# Patient Record
Sex: MALE | Race: WHITE | NOT HISPANIC OR LATINO | Employment: FULL TIME | URBAN - METROPOLITAN AREA
[De-identification: names, ages, dates, MRNs, and addresses within clinical notes are randomized per-mention and may not be internally consistent; named-entity substitution may affect disease eponyms.]

---

## 2017-02-06 RX ORDER — ESCITALOPRAM OXALATE 20 MG/1
TABLET ORAL
Qty: 90 TABLET | Refills: 0 | Status: SHIPPED | OUTPATIENT
Start: 2017-02-06 | End: 2017-07-05 | Stop reason: SDUPTHER

## 2017-02-06 RX ORDER — ESOMEPRAZOLE MAGNESIUM 40 MG/1
CAPSULE, DELAYED RELEASE ORAL
Qty: 90 CAPSULE | Refills: 0 | Status: SHIPPED | OUTPATIENT
Start: 2017-02-06 | End: 2017-07-05 | Stop reason: SDUPTHER

## 2017-02-06 RX ORDER — FENOFIBRATE 145 MG/1
TABLET, COATED ORAL
Qty: 90 TABLET | Refills: 0 | Status: SHIPPED | OUTPATIENT
Start: 2017-02-06 | End: 2017-07-05 | Stop reason: SDUPTHER

## 2017-02-06 RX ORDER — ROSUVASTATIN CALCIUM 40 MG/1
TABLET, COATED ORAL
Qty: 90 TABLET | Refills: 0 | Status: SHIPPED | OUTPATIENT
Start: 2017-02-06 | End: 2017-07-05 | Stop reason: SDUPTHER

## 2017-07-06 RX ORDER — FENOFIBRATE 145 MG/1
TABLET, COATED ORAL
Qty: 90 TABLET | Refills: 0 | Status: SHIPPED | OUTPATIENT
Start: 2017-07-06 | End: 2017-10-05 | Stop reason: SDUPTHER

## 2017-07-06 RX ORDER — ESCITALOPRAM OXALATE 20 MG/1
TABLET ORAL
Qty: 90 TABLET | Refills: 0 | Status: SHIPPED | OUTPATIENT
Start: 2017-07-06 | End: 2017-10-05 | Stop reason: SDUPTHER

## 2017-07-06 RX ORDER — ESOMEPRAZOLE MAGNESIUM 40 MG/1
CAPSULE, DELAYED RELEASE ORAL
Qty: 90 CAPSULE | Refills: 0 | Status: SHIPPED | OUTPATIENT
Start: 2017-07-06 | End: 2017-10-05 | Stop reason: SDUPTHER

## 2017-07-06 RX ORDER — ROSUVASTATIN CALCIUM 40 MG/1
TABLET, COATED ORAL
Qty: 90 TABLET | Refills: 0 | Status: SHIPPED | OUTPATIENT
Start: 2017-07-06 | End: 2017-10-05 | Stop reason: SDUPTHER

## 2017-10-11 RX ORDER — FENOFIBRATE 145 MG/1
TABLET, COATED ORAL
Qty: 90 TABLET | Refills: 0 | Status: SHIPPED | OUTPATIENT
Start: 2017-10-11 | End: 2018-01-23 | Stop reason: SDUPTHER

## 2017-10-11 RX ORDER — ESOMEPRAZOLE MAGNESIUM 40 MG/1
CAPSULE, DELAYED RELEASE ORAL
Qty: 90 CAPSULE | Refills: 0 | Status: SHIPPED | OUTPATIENT
Start: 2017-10-11 | End: 2017-11-15

## 2017-10-11 RX ORDER — ESCITALOPRAM OXALATE 20 MG/1
TABLET ORAL
Qty: 90 TABLET | Refills: 0 | Status: SHIPPED | OUTPATIENT
Start: 2017-10-11 | End: 2018-01-23 | Stop reason: SDUPTHER

## 2017-10-11 RX ORDER — ROSUVASTATIN CALCIUM 40 MG/1
TABLET, COATED ORAL
Qty: 90 TABLET | Refills: 0 | Status: SHIPPED | OUTPATIENT
Start: 2017-10-11 | End: 2018-01-23 | Stop reason: SDUPTHER

## 2017-11-15 ENCOUNTER — OFFICE VISIT (OUTPATIENT)
Dept: SPORTS MEDICINE | Facility: CLINIC | Age: 57
End: 2017-11-15

## 2017-11-15 VITALS
HEART RATE: 70 BPM | SYSTOLIC BLOOD PRESSURE: 112 MMHG | DIASTOLIC BLOOD PRESSURE: 70 MMHG | BODY MASS INDEX: 38.04 KG/M2 | OXYGEN SATURATION: 96 % | WEIGHT: 251 LBS | HEIGHT: 68 IN

## 2017-11-15 DIAGNOSIS — Z00.00 ANNUAL PHYSICAL EXAM: Primary | ICD-10-CM

## 2017-11-15 DIAGNOSIS — H66.002 ACUTE SUPPURATIVE OTITIS MEDIA OF LEFT EAR WITHOUT SPONTANEOUS RUPTURE OF TYMPANIC MEMBRANE, RECURRENCE NOT SPECIFIED: Primary | ICD-10-CM

## 2017-11-15 PROBLEM — T81.30XA WOUND DEHISCENCE: Status: ACTIVE | Noted: 2017-03-08

## 2017-11-15 PROCEDURE — 99213 OFFICE O/P EST LOW 20 MIN: CPT | Performed by: FAMILY MEDICINE

## 2017-11-15 PROCEDURE — 96372 THER/PROPH/DIAG INJ SC/IM: CPT | Performed by: FAMILY MEDICINE

## 2017-11-15 RX ORDER — AMOXICILLIN AND CLAVULANATE POTASSIUM 875; 125 MG/1; MG/1
1 TABLET, FILM COATED ORAL 2 TIMES DAILY
Qty: 20 TABLET | Refills: 0 | Status: SHIPPED | OUTPATIENT
Start: 2017-11-15 | End: 2017-11-20

## 2017-11-15 RX ORDER — METHYLPREDNISOLONE ACETATE 80 MG/ML
80 INJECTION, SUSPENSION INTRA-ARTICULAR; INTRALESIONAL; INTRAMUSCULAR; SOFT TISSUE ONCE
Status: COMPLETED | OUTPATIENT
Start: 2017-11-15 | End: 2017-11-15

## 2017-11-15 RX ORDER — MELOXICAM 15 MG/1
TABLET ORAL
Refills: 3 | COMMUNITY
Start: 2017-11-02 | End: 2018-10-15

## 2017-11-15 RX ADMIN — METHYLPREDNISOLONE ACETATE 80 MG: 80 INJECTION, SUSPENSION INTRA-ARTICULAR; INTRALESIONAL; INTRAMUSCULAR; SOFT TISSUE at 11:42

## 2017-11-15 NOTE — PROGRESS NOTES
"Familia is a 57 y.o. year old male    Chief Complaint   Patient presents with   • Earache       History of Present Illness   HPI   Patient began to note left ear pain yesterday, it seems to have increased over the past 24 hours.  Very tender in and around the ear.  Also has noted some swelling over the anterior ear.  Patient wears a hearing aid in the left ear.  No fever or chills.  No upper respiratory symptoms.  No change in baseline hearing.    I have reviewed the patient's medical, family, and social history in detail and updated the computerized patient record.    Review of Systems   Constitutional: Negative.    HENT: Positive for ear pain. Negative for congestion, dental problem, drooling, ear discharge, facial swelling, postnasal drip, rhinorrhea, sinus pain, sinus pressure and sore throat.    Eyes: Negative.    Respiratory: Negative.    Cardiovascular: Negative.        /70  Pulse 70  Ht 68\" (172.7 cm)  Wt 251 lb (114 kg)  SpO2 96%  BMI 38.16 kg/m2     Physical Exam   Constitutional: He is oriented to person, place, and time. He appears well-developed and well-nourished.   HENT:   Head: Normocephalic and atraumatic.   Right Ear: External ear normal.   Nose: Nose normal.   Mouth/Throat: Oropharynx is clear and moist.   Left TM with purulent material behind the TM and erythema of the umbo.  Patient has tenderness around the mastoid in the tragus but I do not appreciate swelling here area there is no lymphadenopathy in the neck.  There is no evidence of inflammation around the parotid duct.   Eyes: Conjunctivae are normal. No scleral icterus.   Neck: Neck supple. No thyromegaly present.   Cardiovascular: Normal rate, regular rhythm and normal heart sounds.    Pulmonary/Chest: Effort normal and breath sounds normal.   Lymphadenopathy:     He has no cervical adenopathy.   Neurological: He is alert and oriented to person, place, and time.   Skin: Skin is warm and dry.   Psychiatric: He has a normal mood " and affect. His behavior is normal.   Vitals reviewed.       Diagnoses and all orders for this visit:    Acute suppurative otitis media of left ear without spontaneous rupture of tympanic membrane, recurrence not specified  -     amoxicillin-clavulanate (AUGMENTIN) 875-125 MG per tablet; Take 1 tablet by mouth 2 (Two) Times a Day. With food  -     Antipyrine-Benzocaine 55-14 MG/ML solution; Administer 4 drops into ears Every 6 (Six) Hours As Needed (ear pain).  -     methylPREDNISolone acetate (DEPO-medrol) injection 80 mg; Inject 1 mL into the shoulder, thigh, or buttocks 1 (One) Time.    Other orders  -     meloxicam (MOBIC) 15 MG tablet; TK 1 T PO QD      Recheck one week    EMR Dragon/Transcription disclaimer:    Much of this encounter note is an electronic transcription/translation of spoken language to printed text.  The electronic translation of spoken language may permit erroneous, or at times, nonsensical words or phrases to be inadvertently transcribed.  Although I have reviewed the note for such errors some may still exist.

## 2017-11-16 LAB
25(OH)D3+25(OH)D2 SERPL-MCNC: 25 NG/ML (ref 30–100)
ALBUMIN SERPL-MCNC: 4.6 G/DL (ref 3.5–5.2)
ALBUMIN/GLOB SERPL: 1.5 G/DL
ALP SERPL-CCNC: 63 U/L (ref 39–117)
ALT SERPL-CCNC: 43 U/L (ref 1–41)
APPEARANCE UR: CLEAR
AST SERPL-CCNC: 36 U/L (ref 1–40)
BACTERIA #/AREA URNS HPF: NORMAL /HPF
BASOPHILS # BLD AUTO: 0.02 10*3/MM3 (ref 0–0.2)
BASOPHILS NFR BLD AUTO: 0.2 % (ref 0–1.5)
BILIRUB SERPL-MCNC: 0.5 MG/DL (ref 0.1–1.2)
BILIRUB UR QL STRIP: NEGATIVE
BUN SERPL-MCNC: 13 MG/DL (ref 6–20)
BUN/CREAT SERPL: 9.6 (ref 7–25)
CALCIUM SERPL-MCNC: 9.7 MG/DL (ref 8.6–10.5)
CHLORIDE SERPL-SCNC: 100 MMOL/L (ref 98–107)
CHOLEST SERPL-MCNC: 116 MG/DL (ref 0–200)
CO2 SERPL-SCNC: 26.3 MMOL/L (ref 22–29)
COLOR UR: YELLOW
CREAT SERPL-MCNC: 1.35 MG/DL (ref 0.76–1.27)
EOSINOPHIL # BLD AUTO: 0.13 10*3/MM3 (ref 0–0.7)
EOSINOPHIL NFR BLD AUTO: 1.5 % (ref 0.3–6.2)
EPI CELLS #/AREA URNS HPF: NORMAL /HPF
ERYTHROCYTE [DISTWIDTH] IN BLOOD BY AUTOMATED COUNT: 13.7 % (ref 11.5–14.5)
GFR SERPLBLD CREATININE-BSD FMLA CKD-EPI: 54 ML/MIN/1.73
GFR SERPLBLD CREATININE-BSD FMLA CKD-EPI: 66 ML/MIN/1.73
GLOBULIN SER CALC-MCNC: 3 GM/DL
GLUCOSE SERPL-MCNC: 103 MG/DL (ref 65–99)
GLUCOSE UR QL: NEGATIVE
HCT VFR BLD AUTO: 45.1 % (ref 40.4–52.2)
HDLC SERPL-MCNC: 21 MG/DL (ref 40–60)
HGB BLD-MCNC: 14.5 G/DL (ref 13.7–17.6)
HGB UR QL STRIP: NEGATIVE
IMM GRANULOCYTES # BLD: 0 10*3/MM3 (ref 0–0.03)
IMM GRANULOCYTES NFR BLD: 0 % (ref 0–0.5)
KETONES UR QL STRIP: NEGATIVE
LDLC SERPL CALC-MCNC: 50 MG/DL (ref 0–100)
LEUKOCYTE ESTERASE UR QL STRIP: NEGATIVE
LYMPHOCYTES # BLD AUTO: 1.91 10*3/MM3 (ref 0.9–4.8)
LYMPHOCYTES NFR BLD AUTO: 21.9 % (ref 19.6–45.3)
MCH RBC QN AUTO: 30.9 PG (ref 27–32.7)
MCHC RBC AUTO-ENTMCNC: 32.2 G/DL (ref 32.6–36.4)
MCV RBC AUTO: 96.2 FL (ref 79.8–96.2)
MICRO URNS: NORMAL
MICRO URNS: NORMAL
MONOCYTES # BLD AUTO: 0.62 10*3/MM3 (ref 0.2–1.2)
MONOCYTES NFR BLD AUTO: 7.1 % (ref 5–12)
MUCOUS THREADS URNS QL MICRO: PRESENT /HPF
NEUTROPHILS # BLD AUTO: 6.05 10*3/MM3 (ref 1.9–8.1)
NEUTROPHILS NFR BLD AUTO: 69.3 % (ref 42.7–76)
NITRITE UR QL STRIP: NEGATIVE
PH UR STRIP: 7 [PH] (ref 5–7.5)
PLATELET # BLD AUTO: 171 10*3/MM3 (ref 140–500)
POTASSIUM SERPL-SCNC: 4.5 MMOL/L (ref 3.5–5.2)
PROT SERPL-MCNC: 7.6 G/DL (ref 6–8.5)
PROT UR QL STRIP: NEGATIVE
PSA SERPL-MCNC: 6.34 NG/ML (ref 0–4)
RBC # BLD AUTO: 4.69 10*6/MM3 (ref 4.6–6)
RBC #/AREA URNS HPF: NORMAL /HPF
SODIUM SERPL-SCNC: 140 MMOL/L (ref 136–145)
SP GR UR: 1.01 (ref 1–1.03)
TRIGL SERPL-MCNC: 227 MG/DL (ref 0–150)
TSH SERPL DL<=0.005 MIU/L-ACNC: 3.75 UIU/ML (ref 0.45–4.5)
URINALYSIS REFLEX: NORMAL
UROBILINOGEN UR STRIP-MCNC: 0.2 MG/DL (ref 0.2–1)
VLDLC SERPL CALC-MCNC: 45.4 MG/DL (ref 5–40)
WBC # BLD AUTO: 8.73 10*3/MM3 (ref 4.5–10.7)
WBC #/AREA URNS HPF: NORMAL /HPF

## 2017-11-17 LAB
HCV AB S/CO SERPL IA: 0.1 S/CO RATIO (ref 0–0.9)
Lab: NORMAL
WRITTEN AUTHORIZATION: NORMAL

## 2017-11-20 ENCOUNTER — OFFICE VISIT (OUTPATIENT)
Dept: SPORTS MEDICINE | Facility: CLINIC | Age: 57
End: 2017-11-20

## 2017-11-20 VITALS
HEIGHT: 68 IN | OXYGEN SATURATION: 97 % | WEIGHT: 247 LBS | DIASTOLIC BLOOD PRESSURE: 74 MMHG | SYSTOLIC BLOOD PRESSURE: 114 MMHG | HEART RATE: 83 BPM | BODY MASS INDEX: 37.44 KG/M2

## 2017-11-20 DIAGNOSIS — H66.002 ACUTE SUPPURATIVE OTITIS MEDIA OF LEFT EAR WITHOUT SPONTANEOUS RUPTURE OF TYMPANIC MEMBRANE, RECURRENCE NOT SPECIFIED: Primary | ICD-10-CM

## 2017-11-20 PROCEDURE — 99212 OFFICE O/P EST SF 10 MIN: CPT | Performed by: FAMILY MEDICINE

## 2017-11-20 NOTE — PROGRESS NOTES
"Familia is a 57 y.o. year old male    Chief Complaint   Patient presents with   • Follow-up       History of Present Illness   HPI   Patient is here to follow-up left otitis media, seen originally on 11/15/2017.  Patient states he is seen significant improvement in his symptoms however yesterday he did note some bright red blood in the ear canal.  Patient has underlying hearing loss in his left ear but he states \"it seems like he is getting better.  Only has minimal pain over the mastoid area at this time.  I have reviewed the patient's medical, family, and social history in detail and updated the computerized patient record.    Review of Systems   Constitutional: Negative.    HENT: Positive for ear discharge ( right rib blood yesterday however no purulent materal.) and ear pain.    Eyes: Negative.    Respiratory: Negative.        /74  Pulse 83  Ht 68\" (172.7 cm)  Wt 247 lb (112 kg)  SpO2 97%  BMI 37.56 kg/m2     Physical Exam   Constitutional: He is oriented to person, place, and time. He appears well-developed and well-nourished.   HENT:   Head: Normocephalic and atraumatic.   Left TM much improved, I do not see any purulent material and there is no injection over the umbo, no evidence of where the bright red blood may have come from yesterday.    Patient is much less tender around and behind the year on today's exam.   Eyes: Conjunctivae and EOM are normal. Pupils are equal, round, and reactive to light.   Cardiovascular:   No peripheral edema   Pulmonary/Chest: Effort normal.   Neurological: He is alert and oriented to person, place, and time.   Skin: Skin is warm and dry.   Psychiatric: He has a normal mood and affect. His behavior is normal.   Vitals reviewed.       Diagnoses and all orders for this visit:    Acute suppurative otitis media of left ear without spontaneous rupture of tympanic membrane, recurrence not specified      Patient will finish his course of antibiotic and follow-up with us in " 7-10 days if his ear is not back to what he considers normal.    EMR Dragon/Transcription disclaimer:    Much of this encounter note is an electronic transcription/translation of spoken language to printed text.  The electronic translation of spoken language may permit erroneous, or at times, nonsensical words or phrases to be inadvertently transcribed.  Although I have reviewed the note for such errors some may still exist.

## 2018-01-23 RX ORDER — FENOFIBRATE 145 MG/1
TABLET, COATED ORAL
Qty: 90 TABLET | Refills: 0 | Status: SHIPPED | OUTPATIENT
Start: 2018-01-23 | End: 2018-09-26 | Stop reason: SDUPTHER

## 2018-01-23 RX ORDER — ROSUVASTATIN CALCIUM 40 MG/1
TABLET, COATED ORAL
Qty: 90 TABLET | Refills: 0 | Status: SHIPPED | OUTPATIENT
Start: 2018-01-23 | End: 2018-06-15 | Stop reason: SDUPTHER

## 2018-01-23 RX ORDER — ESOMEPRAZOLE MAGNESIUM 40 MG/1
CAPSULE, DELAYED RELEASE ORAL
Qty: 90 CAPSULE | Refills: 0 | Status: SHIPPED | OUTPATIENT
Start: 2018-01-23 | End: 2018-09-26 | Stop reason: SDUPTHER

## 2018-01-23 RX ORDER — ESCITALOPRAM OXALATE 20 MG/1
TABLET ORAL
Qty: 90 TABLET | Refills: 0 | Status: SHIPPED | OUTPATIENT
Start: 2018-01-23 | End: 2018-06-15 | Stop reason: SDUPTHER

## 2018-03-30 ENCOUNTER — OFFICE VISIT (OUTPATIENT)
Dept: SPORTS MEDICINE | Facility: CLINIC | Age: 58
End: 2018-03-30

## 2018-03-30 VITALS — HEIGHT: 68 IN | SYSTOLIC BLOOD PRESSURE: 110 MMHG | DIASTOLIC BLOOD PRESSURE: 70 MMHG

## 2018-03-30 DIAGNOSIS — M51.36 DDD (DEGENERATIVE DISC DISEASE), LUMBAR: ICD-10-CM

## 2018-03-30 DIAGNOSIS — M54.41 ACUTE RIGHT-SIDED LOW BACK PAIN WITH RIGHT-SIDED SCIATICA: Primary | ICD-10-CM

## 2018-03-30 DIAGNOSIS — R10.31 RIGHT INGUINAL PAIN: ICD-10-CM

## 2018-03-30 PROCEDURE — 72100 X-RAY EXAM L-S SPINE 2/3 VWS: CPT | Performed by: FAMILY MEDICINE

## 2018-03-30 PROCEDURE — 72170 X-RAY EXAM OF PELVIS: CPT | Performed by: FAMILY MEDICINE

## 2018-03-30 PROCEDURE — 99214 OFFICE O/P EST MOD 30 MIN: CPT | Performed by: FAMILY MEDICINE

## 2018-03-30 RX ORDER — METHYLPREDNISOLONE 4 MG/1
TABLET ORAL
Qty: 21 TABLET | Refills: 0 | Status: SHIPPED | OUTPATIENT
Start: 2018-03-30 | End: 2018-09-19

## 2018-03-30 NOTE — PROGRESS NOTES
"Familia is a 57 y.o. year old male    Chief Complaint   Patient presents with   • Groin Pain       History of Present Illness  HPI     Groin pain: Acute onset yesterday.  Gradual progression.  Denies fall.  Has had pain in the saddle region which she describes as a dull ache.  History of degenerative disc disease lumbar spine and has been on medication in the past.  States that he had [place].  No bowel or bladder incontinence.  No foot drop.  Pain to the point that he has been ambulating with a 4 stance walker.  History of enlarged prostate.  Has not taking any medication for this.    I have reviewed the patient's medical, family, and social history in detail and updated the computerized patient record.    Review of Systems   Constitutional: Negative for fever.   Musculoskeletal:        Per HPI   Skin: Negative for rash.   Neurological: Negative for weakness and numbness.   Psychiatric/Behavioral: Negative for sleep disturbance.   All other systems reviewed and are negative.      /70   Ht 172.7 cm (68\")      Physical Exam    Vital signs reviewed.   General: No acute distress.  Eyes: conjunctiva clear; pupils equally round and reactive  ENT: external ears and nose atraumatic; oropharynx clear  CV: no peripheral edema, 2+ distal pulses  Resp: normal respiratory effort, no use of accessory muscles  : Normal rectal tone, prostate exam demonstrates smooth and enlarged prostate  Skin: no rashes or wounds; normal turgor  Psych: mood and affect appropriate; recent and remote memory intact  Neuro: sensation to light touch intact; 2+ DTR L4, S1 bilateral    MSK Exam:  Ortho Exam    L-spine: No warmth; tenderness along the right paraspinals; negative straight leg raise bilateral; negative ALY bilateral; negative Stenchfield   L hip: No tenderness or warmth; full range of motion; negative logroll  R hip: No tenderness or warmth; full range of motion; negative " logroll    Lumbar Spine X-Ray  Indication: Pain  Views: AP and Lateral    Findings:  No fracture  No bony lesion  Normal soft tissues  Multilevel DDD, most notable L4-5  levoscoliosis    No prior studies were available for comparison.    Pelvis X-Ray  Indication: Pain  AP and Frogleg views    Findings:  No fracture  No bony lesion  Normal soft tissues  Early OA b/l femoroacetabular joint    No prior studies were available for comparison.      Diagnoses and all orders for this visit:    Acute right-sided low back pain with right-sided sciatica  -     XR Spine Lumbar 2 or 3 View  -     MethylPREDNISolone (MEDROL, ELADIO,) 4 MG tablet; Take as directed on package instructions.    DDD (degenerative disc disease), lumbar  -     MethylPREDNISolone (MEDROL, ELADIO,) 4 MG tablet; Take as directed on package instructions.    Right inguinal pain  -     XR Pelvis 1 or 2 View  -     MethylPREDNISolone (MEDROL, ELADIO,) 4 MG tablet; Take as directed on package instructions.      No red flag symptoms on exam.  Discussed that this is likely sequela of previous degenerative disc disease.  Discussed x-ray findings with patient.  I recommend Medrol Dosepak.  Up ad terra.    EMR Dragon/Transcription disclaimer:    Much of this encounter note is an electronic transcription/translation of spoken language to printed text.  The electronic translation of spoken language may permit erroneous, or at times, nonsensical words or phrases to be inadvertently transcribed.  Although I have reviewed the note for such errors some may still exist.

## 2018-06-15 RX ORDER — ROSUVASTATIN CALCIUM 40 MG/1
TABLET, COATED ORAL
Qty: 90 TABLET | Refills: 0 | Status: SHIPPED | OUTPATIENT
Start: 2018-06-15 | End: 2018-09-26 | Stop reason: SDUPTHER

## 2018-06-15 RX ORDER — ESCITALOPRAM OXALATE 20 MG/1
TABLET ORAL
Qty: 90 TABLET | Refills: 0 | Status: SHIPPED | OUTPATIENT
Start: 2018-06-15 | End: 2018-09-26 | Stop reason: SDUPTHER

## 2018-09-19 ENCOUNTER — OFFICE VISIT (OUTPATIENT)
Dept: SPORTS MEDICINE | Facility: CLINIC | Age: 58
End: 2018-09-19

## 2018-09-19 VITALS
TEMPERATURE: 98.9 F | HEART RATE: 72 BPM | WEIGHT: 241 LBS | HEIGHT: 68 IN | OXYGEN SATURATION: 97 % | SYSTOLIC BLOOD PRESSURE: 108 MMHG | DIASTOLIC BLOOD PRESSURE: 68 MMHG | BODY MASS INDEX: 36.53 KG/M2

## 2018-09-19 DIAGNOSIS — R10.9 RIGHT FLANK PAIN: Primary | ICD-10-CM

## 2018-09-19 DIAGNOSIS — R10.11 RUQ PAIN: ICD-10-CM

## 2018-09-19 PROCEDURE — 99214 OFFICE O/P EST MOD 30 MIN: CPT | Performed by: FAMILY MEDICINE

## 2018-09-19 NOTE — PROGRESS NOTES
"Familia is a 57 y.o. year old male    Chief Complaint   Patient presents with   • Back Pain       History of Present Illness   HPI   Approximately one month ago patient began to note some right-sided flank discomfort that radiated to the right upper quadrant.  No known trauma.  Has not noted any alleviating or precipitating factors.  At that time it seemed to resolve but has returned for the past week.  Pain described as a continuous and dull ache.  No fever or chills.  Normal bowel movements.  No hematuria.  No rash.        I have reviewed the patient's medical, family, and social history in detail and updated the computerized patient record.    Review of Systems   Constitutional: Negative.    HENT: Negative.    Respiratory: Negative.    Cardiovascular: Negative.    Gastrointestinal: Positive for abdominal pain. Negative for blood in stool, nausea and vomiting.   Genitourinary: Positive for flank pain.        Otherwise negative   Neurological:        No radicular symptoms       /68 (BP Location: Right arm, Patient Position: Sitting, Cuff Size: Large Adult)   Pulse 72   Temp 98.9 °F (37.2 °C) (Oral)   Ht 172.7 cm (67.99\")   Wt 109 kg (241 lb)   SpO2 97%   BMI 36.65 kg/m²      Physical Exam   Constitutional: He is oriented to person, place, and time. He appears well-developed and well-nourished.   HENT:   Head: Normocephalic and atraumatic.   Eyes: Pupils are equal, round, and reactive to light. Conjunctivae and EOM are normal.   Cardiovascular:   No peripheral edema   Pulmonary/Chest: Effort normal and breath sounds normal.   Abdominal: Soft. Bowel sounds are normal.   She has some mild tenderness to palpation just anterior to the midaxillary line on the right.    Patient also has right flank discomfort with palpation, somewhat worse with lateral bending to the left.   Neurological: He is alert and oriented to person, place, and time.   Skin: Skin is warm and dry.   Psychiatric: He has a normal mood and " affect. His behavior is normal.   Vitals reviewed.       Diagnoses and all orders for this visit:    Right flank pain  -     CT Abdomen Pelvis Without Contrast; Future  -     Comprehensive Metabolic Panel  -     CBC & Differential  -     UA / M With / Rflx Culture([facility] ONLY) - Urine, Clean Catch  -     Amylase  -     Lipase    RUQ pain  -     CT Abdomen Pelvis Without Contrast; Future  -     Comprehensive Metabolic Panel  -     CBC & Differential  -     UA / M With / Rflx Culture([facility] ONLY) - Urine, Clean Catch  -     Amylase  -     Lipase       This appears to be more consistent with intra-abdominal process rather than musculoskeletal.  We'll check the above labs and CT scan.      EMR Dragon/Transcription disclaimer:    Much of this encounter note is an electronic transcription/translation of spoken language to printed text.  The electronic translation of spoken language may permit erroneous, or at times, nonsensical words or phrases to be inadvertently transcribed.  Although I have reviewed the note for such errors some may still exist.

## 2018-09-20 LAB
ALBUMIN SERPL-MCNC: 4.8 G/DL (ref 3.5–5.2)
ALBUMIN/GLOB SERPL: 1.8 G/DL
ALP SERPL-CCNC: 74 U/L (ref 39–117)
ALT SERPL-CCNC: 63 U/L (ref 1–41)
AMYLASE SERPL-CCNC: 24 U/L (ref 28–100)
APPEARANCE UR: CLEAR
AST SERPL-CCNC: 58 U/L (ref 1–40)
BACTERIA #/AREA URNS HPF: NORMAL /HPF
BASOPHILS # BLD AUTO: 0.02 10*3/MM3 (ref 0–0.2)
BASOPHILS NFR BLD AUTO: 0.3 % (ref 0–1.5)
BILIRUB SERPL-MCNC: 0.4 MG/DL (ref 0.1–1.2)
BILIRUB UR QL STRIP: NEGATIVE
BUN SERPL-MCNC: 12 MG/DL (ref 6–20)
BUN/CREAT SERPL: 10.3 (ref 7–25)
CALCIUM SERPL-MCNC: 9.6 MG/DL (ref 8.6–10.5)
CHLORIDE SERPL-SCNC: 102 MMOL/L (ref 98–107)
CO2 SERPL-SCNC: 27.6 MMOL/L (ref 22–29)
COLOR UR: YELLOW
CREAT SERPL-MCNC: 1.17 MG/DL (ref 0.76–1.27)
EOSINOPHIL # BLD AUTO: 0.09 10*3/MM3 (ref 0–0.7)
EOSINOPHIL NFR BLD AUTO: 1.5 % (ref 0.3–6.2)
EPI CELLS #/AREA URNS HPF: NORMAL /HPF
ERYTHROCYTE [DISTWIDTH] IN BLOOD BY AUTOMATED COUNT: 14.3 % (ref 11.5–14.5)
GLOBULIN SER CALC-MCNC: 2.7 GM/DL
GLUCOSE SERPL-MCNC: 107 MG/DL (ref 65–99)
GLUCOSE UR QL: NEGATIVE
HCT VFR BLD AUTO: 46.2 % (ref 40.4–52.2)
HGB BLD-MCNC: 14.5 G/DL (ref 13.7–17.6)
HGB UR QL STRIP: NEGATIVE
IMM GRANULOCYTES # BLD: 0 10*3/MM3 (ref 0–0.03)
IMM GRANULOCYTES NFR BLD: 0 % (ref 0–0.5)
KETONES UR QL STRIP: NEGATIVE
LEUKOCYTE ESTERASE UR QL STRIP: NEGATIVE
LIPASE SERPL-CCNC: 41 U/L (ref 13–60)
LYMPHOCYTES # BLD AUTO: 1.78 10*3/MM3 (ref 0.9–4.8)
LYMPHOCYTES NFR BLD AUTO: 29.4 % (ref 19.6–45.3)
MCH RBC QN AUTO: 28.9 PG (ref 27–32.7)
MCHC RBC AUTO-ENTMCNC: 31.4 G/DL (ref 32.6–36.4)
MCV RBC AUTO: 92 FL (ref 79.8–96.2)
MICRO URNS: NORMAL
MICRO URNS: NORMAL
MONOCYTES # BLD AUTO: 0.41 10*3/MM3 (ref 0.2–1.2)
MONOCYTES NFR BLD AUTO: 6.8 % (ref 5–12)
MUCOUS THREADS URNS QL MICRO: PRESENT /HPF
NEUTROPHILS # BLD AUTO: 3.76 10*3/MM3 (ref 1.9–8.1)
NEUTROPHILS NFR BLD AUTO: 62 % (ref 42.7–76)
NITRITE UR QL STRIP: NEGATIVE
PH UR STRIP: 6.5 [PH] (ref 5–7.5)
PLATELET # BLD AUTO: 172 10*3/MM3 (ref 140–500)
POTASSIUM SERPL-SCNC: 4.4 MMOL/L (ref 3.5–5.2)
PROT SERPL-MCNC: 7.5 G/DL (ref 6–8.5)
PROT UR QL STRIP: NEGATIVE
RBC # BLD AUTO: 5.02 10*6/MM3 (ref 4.6–6)
RBC #/AREA URNS HPF: NORMAL /HPF
SODIUM SERPL-SCNC: 141 MMOL/L (ref 136–145)
SP GR UR: 1.02 (ref 1–1.03)
URINALYSIS REFLEX: NORMAL
UROBILINOGEN UR STRIP-MCNC: 0.2 MG/DL (ref 0.2–1)
WBC # BLD AUTO: 6.06 10*3/MM3 (ref 4.5–10.7)
WBC #/AREA URNS HPF: NORMAL /HPF

## 2018-09-21 ENCOUNTER — TELEPHONE (OUTPATIENT)
Dept: SPORTS MEDICINE | Facility: CLINIC | Age: 58
End: 2018-09-21

## 2018-09-21 LAB
HAV IGM SERPL QL IA: NEGATIVE
HBV CORE IGM SERPL QL IA: NEGATIVE
HBV SURFACE AG SERPL QL IA: NEGATIVE
HCV AB S/CO SERPL IA: <0.1 S/CO RATIO (ref 0–0.9)
Lab: NORMAL
WRITTEN AUTHORIZATION: NORMAL

## 2018-09-26 ENCOUNTER — OFFICE VISIT (OUTPATIENT)
Dept: SPORTS MEDICINE | Facility: CLINIC | Age: 58
End: 2018-09-26

## 2018-09-26 VITALS
WEIGHT: 240 LBS | BODY MASS INDEX: 36.37 KG/M2 | DIASTOLIC BLOOD PRESSURE: 82 MMHG | SYSTOLIC BLOOD PRESSURE: 120 MMHG | HEIGHT: 68 IN

## 2018-09-26 DIAGNOSIS — R10.31 RLQ ABDOMINAL PAIN: Primary | ICD-10-CM

## 2018-09-26 PROCEDURE — 99213 OFFICE O/P EST LOW 20 MIN: CPT | Performed by: FAMILY MEDICINE

## 2018-09-26 RX ORDER — ESCITALOPRAM OXALATE 20 MG/1
20 TABLET ORAL DAILY
Qty: 90 TABLET | Refills: 0 | Status: SHIPPED | OUTPATIENT
Start: 2018-09-26 | End: 2019-10-18 | Stop reason: SDUPTHER

## 2018-09-26 RX ORDER — ROSUVASTATIN CALCIUM 40 MG/1
40 TABLET, COATED ORAL DAILY
Qty: 90 TABLET | Refills: 0 | Status: SHIPPED | OUTPATIENT
Start: 2018-09-26 | End: 2019-08-29

## 2018-09-26 RX ORDER — ESOMEPRAZOLE MAGNESIUM 40 MG/1
40 CAPSULE, DELAYED RELEASE ORAL DAILY
Qty: 90 CAPSULE | Refills: 0 | Status: SHIPPED | OUTPATIENT
Start: 2018-09-26 | End: 2018-10-15

## 2018-09-26 RX ORDER — FENOFIBRATE 145 MG/1
145 TABLET, COATED ORAL DAILY
Qty: 90 TABLET | Refills: 0 | Status: SHIPPED | OUTPATIENT
Start: 2018-09-26 | End: 2018-10-15

## 2018-09-26 NOTE — PROGRESS NOTES
"Familia is a 57 y.o. year old male    Chief Complaint   Patient presents with   • Flank Pain     right       History of Present Illness   HPI   F/u lower abdominal pain, just right of center. Maybe alleviated by urination. Constant, dull ache, mild-moderate severity. Worsening over the past few months.     I have reviewed the patient's medical, family, and social history in detail and updated the computerized patient record.    Review of Systems   Constitutional: Negative.    Gastrointestinal: Negative for blood in stool.       /82   Ht 172.7 cm (68\")   Wt 109 kg (240 lb)   BMI 36.49 kg/m²      Physical Exam   Constitutional: He appears well-developed and well-nourished.   HENT:   Mouth/Throat: Oropharynx is clear and moist.   Eyes: Pupils are equal, round, and reactive to light.   Pulmonary/Chest: Effort normal.   Abdominal: Soft. Bowel sounds are normal.   There is sharp tenderness to palpation on the right lower abdomen, following the division between the rectus abdominis and the obliques.  This extends superiorly into the mid abdomen as well.  This tenderness is rather sharp, causes him to jump on the table somewhat.   Genitourinary:   Genitourinary Comments: Mildly enlarged prostate, nontender   Psychiatric: He has a normal mood and affect.   Vitals reviewed.       Diagnoses and all orders for this visit:    RLQ abdominal pain  -     Ambulatory Referral to General Surgery    Other orders  -     rosuvastatin (CRESTOR) 40 MG tablet; Take 1 tablet by mouth Daily.  -     fenofibrate (TRICOR) 145 MG tablet; Take 1 tablet by mouth Daily.  -     esomeprazole (nexIUM) 40 MG capsule; Take 1 capsule by mouth Daily.  -     escitalopram (LEXAPRO) 20 MG tablet; Take 1 tablet by mouth Daily.       Reviewed his normal labs and nonacute findings on CT scan from last week.  Based on his exam today, I'm concerned he could have the spigelian hernia, he states that he has had pain intermittently in this region for many " years but it has been getting worse the past month.  Brief bedside ultrasound does not show any abdominal wall defect.    Also due for routine physical, he'll schedule that later this year.      EMR Dragon/Transcription disclaimer:    Much of this encounter note is an electronic transcription/translation of spoken language to printed text.  The electronic translation of spoken language may permit erroneous, or at times, nonsensical words or phrases to be inadvertently transcribed.  Although I have reviewed the note for such errors some may still exist.

## 2018-10-15 ENCOUNTER — OFFICE VISIT (OUTPATIENT)
Dept: SURGERY | Facility: CLINIC | Age: 58
End: 2018-10-15

## 2018-10-15 VITALS — WEIGHT: 240 LBS | OXYGEN SATURATION: 98 % | BODY MASS INDEX: 36.37 KG/M2 | HEIGHT: 68 IN | HEART RATE: 71 BPM

## 2018-10-15 DIAGNOSIS — R10.31 RLQ ABDOMINAL PAIN: Primary | ICD-10-CM

## 2018-10-15 DIAGNOSIS — R10.31 RIGHT INGUINAL PAIN: ICD-10-CM

## 2018-10-15 PROCEDURE — 99244 OFF/OP CNSLTJ NEW/EST MOD 40: CPT | Performed by: SURGERY

## 2018-10-15 RX ORDER — OMEPRAZOLE 20 MG/1
20 CAPSULE, DELAYED RELEASE ORAL DAILY
COMMUNITY

## 2018-10-15 NOTE — PROGRESS NOTES
SUMMARY (A/P):    57-year-old gentleman with long-standing right inguinal and right lower quadrant pain and questionable findings on exam.  CT scan was negative for hernia or adenopathy.  At this point have recommended proceeding with colonoscopy to evaluate further.  If his colonoscopy is negative and it is not felt that his lumbar disc disease is a potential source of his pain, then laparoscopy may need to be considered to further evaluate for possibility of abdominal wall or inguinal hernia.      CC:    Right lower quadrant pain, referred by Dr. Khurram Kunz for consultation    HPI:    57-year-old gentleman presents with roughly 2 year history of intermittent right lower quadrant abdominal pain that radiates to the back and is moderate at worst.  It recently worsened during a long drive and included right inguinal pain and swelling on self-exam.  He denies any rectal bleeding, constipation or diarrhea, nausea or vomiting, fever chills or night sweats, or unexpected weight loss.    PSH:    No previous abdominal surgery    PMH:    Gastroesophageal reflux disease  Arthritis  Sleep apnea (uses CPAP)  Hyperlipidemia    FAMILY HISTORY:    Negative for colorectal cancer    SOCIAL HISTORY:   Denies tobacco use  Denies alcohol use    ALLERGIES: reviewed, in Epic    MEDICATIONS: reviewed, in Epic    ROS:  No chest pain or shortness of air.  All other systems reviewed and negative other than presenting complaints.    RADIOLOGY/ENDOSCOPY:    CT abdomen pelvis at [facility] on 9/19/2018 demonstrated no acute abnormality that would account for his symptoms.    PHYSICAL EXAM:   Constitutional: Well-developed well-nourished, no acute distress  Vital signs: Heart rate 71, weight 240 pounds, height 68 inches, BMI 36.5  Discussed with patient increased perioperative risks associated with obesity including increased risks of DVT, infection, seromas, poor wound healing and hernias (with abdominal surgery).  Eyes: Conjunctiva normal,  sclera nonicteric  ENMT: Hearing grossly normal, oral mucosa moist  Neck: Supple, no palpable mass, normal thyroid, trachea midline  Respiratory: Clear to auscultation, normal inspiratory effort  Cardiovascular: Regular rate, no murmur, no carotid bruit, no peripheral edema, no jugular venous distention  Gastrointestinal: Soft, nontender, no palpable mass, no hepatosplenomegaly, negative for hernia, bowel sounds normal  Genitourinary: Testicles are descended and normal.  There is questionable fullness in the right inguinal region relative to the left but no definite hernia and no definite adenopathy.  Lymphatics (palpable nodes):  cervical-negative, inguinal-negative  Skin:  Warm, dry, no rash on visualized skin surfaces  Musculoskeletal: Symmetric strength, normal gait  Psychiatric: Alert and oriented ×3, normal affect     MARK ALLEN M.D.

## 2018-10-31 ENCOUNTER — ANESTHESIA EVENT (OUTPATIENT)
Dept: GASTROENTEROLOGY | Facility: HOSPITAL | Age: 58
End: 2018-10-31

## 2018-10-31 ENCOUNTER — ANESTHESIA (OUTPATIENT)
Dept: GASTROENTEROLOGY | Facility: HOSPITAL | Age: 58
End: 2018-10-31

## 2018-10-31 ENCOUNTER — HOSPITAL ENCOUNTER (OUTPATIENT)
Facility: HOSPITAL | Age: 58
Setting detail: HOSPITAL OUTPATIENT SURGERY
Discharge: HOME | End: 2018-10-31
Attending: SURGERY | Admitting: SURGERY

## 2018-10-31 VITALS
HEART RATE: 59 BPM | SYSTOLIC BLOOD PRESSURE: 110 MMHG | TEMPERATURE: 97.9 F | OXYGEN SATURATION: 97 % | RESPIRATION RATE: 12 BRPM | DIASTOLIC BLOOD PRESSURE: 78 MMHG | WEIGHT: 234 LBS | BODY MASS INDEX: 35.58 KG/M2

## 2018-10-31 PROCEDURE — 45378 DIAGNOSTIC COLONOSCOPY: CPT | Performed by: SURGERY

## 2018-10-31 PROCEDURE — 25010000002 PROPOFOL 10 MG/ML EMULSION: Performed by: ANESTHESIOLOGY

## 2018-10-31 RX ORDER — LIDOCAINE HYDROCHLORIDE 20 MG/ML
INJECTION, SOLUTION INFILTRATION; PERINEURAL AS NEEDED
Status: DISCONTINUED | OUTPATIENT
Start: 2018-10-31 | End: 2018-10-31 | Stop reason: SURG

## 2018-10-31 RX ORDER — PROPOFOL 10 MG/ML
VIAL (ML) INTRAVENOUS AS NEEDED
Status: DISCONTINUED | OUTPATIENT
Start: 2018-10-31 | End: 2018-10-31 | Stop reason: SURG

## 2018-10-31 RX ORDER — SODIUM CHLORIDE, SODIUM LACTATE, POTASSIUM CHLORIDE, CALCIUM CHLORIDE 600; 310; 30; 20 MG/100ML; MG/100ML; MG/100ML; MG/100ML
1000 INJECTION, SOLUTION INTRAVENOUS CONTINUOUS
Status: DISCONTINUED | OUTPATIENT
Start: 2018-10-31 | End: 2018-10-31 | Stop reason: HOSPADM

## 2018-10-31 RX ADMIN — SODIUM CHLORIDE, POTASSIUM CHLORIDE, SODIUM LACTATE AND CALCIUM CHLORIDE 1000 ML: 600; 310; 30; 20 INJECTION, SOLUTION INTRAVENOUS at 13:16

## 2018-10-31 RX ADMIN — PROPOFOL 250 MG: 10 INJECTION, EMULSION INTRAVENOUS at 13:35

## 2018-10-31 RX ADMIN — SODIUM CHLORIDE, POTASSIUM CHLORIDE, SODIUM LACTATE AND CALCIUM CHLORIDE: 600; 310; 30; 20 INJECTION, SOLUTION INTRAVENOUS at 13:35

## 2018-10-31 RX ADMIN — LIDOCAINE HYDROCHLORIDE 60 MG: 20 INJECTION, SOLUTION INFILTRATION; PERINEURAL at 13:35

## 2018-10-31 NOTE — ANESTHESIA PREPROCEDURE EVALUATION
Anesthesia Evaluation     Patient summary reviewed and Nursing notes reviewed   NPO Solid Status: > 8 hours  NPO Liquid Status: > 4 hours           Airway   Mallampati: II  TM distance: >3 FB  Neck ROM: full  no difficulty expected  Dental - normal exam     Pulmonary - normal exam   Cardiovascular - normal exam    (+) hyperlipidemia,       Neuro/Psych  GI/Hepatic/Renal/Endo    (+) obesity, morbid obesity, GERD,      Musculoskeletal     Abdominal  - normal exam   Substance History      OB/GYN          Other   (+) arthritis                     Anesthesia Plan    ASA 3     MAC     Anesthetic plan, all risks, benefits, and alternatives have been provided, discussed and informed consent has been obtained with: patient.

## 2018-10-31 NOTE — ANESTHESIA POSTPROCEDURE EVALUATION
Patient: Familia Duke    Procedure Summary     Date:  10/31/18 Room / Location:   [facility]    Anesthesia Start:  1334 Anesthesia Stop:  1353    Procedure:  COLONOSCOPY  TO CECUM/TI (N/A ) Diagnosis:       RLQ abdominal pain      Right inguinal pain      (RLQ abdominal pain [R10.31])      (Right inguinal pain [R10.31])    Surgeon:  Yomi Wiley MD Provider:  Parker Alvarez MD    Anesthesia Type:  MAC ASA Status:  3          Anesthesia Type: MAC  Last vitals  BP   108/69 (10/31/18 1305)   Temp   36.6 °C (97.9 °F) (10/31/18 1305)   Pulse   62 (10/31/18 1305)   Resp   12 (10/31/18 1305)     SpO2   97 % (10/31/18 1305)     Post Anesthesia Care and Evaluation    Patient location during evaluation: bedside  Patient participation: complete - patient participated  Level of consciousness: awake and alert  Pain management: adequate  Airway patency: patent  Anesthetic complications: No anesthetic complications    Cardiovascular status: acceptable  Respiratory status: acceptable  Hydration status: acceptable    Comments: /69 (BP Location: Left arm, Patient Position: Lying)   Pulse 62   Temp 36.6 °C (97.9 °F) (Oral)   Resp 12   Wt 106 kg (234 lb)   SpO2 97%   BMI 35.58 kg/m²

## 2019-04-25 ENCOUNTER — OFFICE VISIT (OUTPATIENT)
Dept: SPORTS MEDICINE | Facility: CLINIC | Age: 59
End: 2019-04-25

## 2019-04-25 VITALS
SYSTOLIC BLOOD PRESSURE: 110 MMHG | BODY MASS INDEX: 35.46 KG/M2 | DIASTOLIC BLOOD PRESSURE: 64 MMHG | HEIGHT: 68 IN | WEIGHT: 234 LBS

## 2019-04-25 DIAGNOSIS — M79.604 BILATERAL LEG PAIN: ICD-10-CM

## 2019-04-25 DIAGNOSIS — M13.0 POLYARTHRITIS: ICD-10-CM

## 2019-04-25 DIAGNOSIS — M51.36 DDD (DEGENERATIVE DISC DISEASE), LUMBAR: ICD-10-CM

## 2019-04-25 DIAGNOSIS — M79.605 BILATERAL LEG PAIN: ICD-10-CM

## 2019-04-25 DIAGNOSIS — R10.31 RIGHT INGUINAL PAIN: Primary | ICD-10-CM

## 2019-04-25 PROCEDURE — 99214 OFFICE O/P EST MOD 30 MIN: CPT | Performed by: FAMILY MEDICINE

## 2019-04-25 NOTE — PROGRESS NOTES
"Familia is a 58 y.o. year old male evaluation of a problem that is new to this examiner.        Chief Complaint   Patient presents with   • Leg Pain     Bilateral leg pain, X 6 months, more when legs are up        History of Present Illness  HPI   Here for new complaint of bilateral leg pain, worse in certain positions particularly propped up on a chair or ottoman. Also worse with prolonged standing, but not necessarily with walking. Gradually worsening for 6 months, feels \"like a knuckle pushing in,\" hot, throbbing, pulsing. At worst 9/10, improves with changing positions within 5-10 minutes. Has also woken from sleep in pain. Radiates down both legs.     Also describes more focused right hip/groin pain that we evaluated previously.  He saw Dr. Wiley who recommended laparoscopic surgery for possible occult hernia.  Patient decided not to follow through with that.  Continues to have rather sharp pain here, intermittent, worse with certain activities.    I have reviewed the patient's medical, family, and social history in detail and updated the computerized patient record.    Review of Systems   Constitutional: Negative.    Respiratory: Negative.    Cardiovascular: Negative.    Genitourinary: Negative.    Musculoskeletal: Positive for arthralgias and back pain.   Neurological: Negative for numbness.   Psychiatric/Behavioral: Negative.        /64 (BP Location: Left arm, Patient Position: Sitting, Cuff Size: Large Adult)   Ht 172.7 cm (67.99\")   Wt 106 kg (234 lb)   BMI 35.59 kg/m²      Physical Exam    Vital signs reviewed.   General: No acute distress.  Eyes: conjunctiva clear; pupils equally round and reactive  ENT: external ears and nose atraumatic; oropharynx clear  CV: no peripheral edema, 2+ distal pulses  Resp: normal respiratory effort, no use of accessory muscles  Skin: no rashes or wounds; normal turgor  Psych: mood and affect appropriate; recent and remote memory intact  Neuro: sensation to light " touch intact    MSK Exam:  Ortho Exam  Bilateral legs: Normal appearance.  Normal range of motion.  Normal strength.  Normal sensation.  Symmetric 2+ patellar reflexes, 1+ Achilles reflexes.  Negative slump    Right hip: Normal appearance.  Tenderness palpation along the central portion of the inguinal ligament.  Normal range of motion of both hips.  Also some tenderness to palpation on the pubic symphysis.  No pain with resisted abduction or adduction.  There is pain with pelvic tilt and bridge.    Reviewed previous lumbar x-rays which did show degenerative changes.        Diagnoses and all orders for this visit:    Right inguinal pain  -     MRI Pelvis Without Contrast; Future    DDD (degenerative disc disease), lumbar  -     MRI Lumbar Spine Without Contrast; Future    Bilateral leg pain  -     MRI Lumbar Spine Without Contrast; Future    Polyarthritis  -     DOMINICK With / DsDNA, RNP, Sjogrens A / B, Smith  -     C-reactive Protein  -     Rheumatoid Factor  -     Sedimentation Rate  -     Cyclic Citrul Peptide Antibody, IgG / IgA  -     HLA-B27 Antigen  -     Uric Acid  -     CBC & Differential  -     Comprehensive Metabolic Panel      We will work-up polyarthritis, consider rheumatology consult as well.  Regarding his bilateral leg pain, I think this is lumbar in origin.  Check MRI  I think his right groin complaints can be athletic pubalgia, chronic adductor tenotomy, or occult hernia.  Will work-up with an MRI.    EMR Dragon/Transcription disclaimer:    Much of this encounter note is an electronic transcription/translation of spoken language to printed text.  The electronic translation of spoken language may permit erroneous, or at times, nonsensical words or phrases to be inadvertently transcribed.  Although I have reviewed the note for such errors some may still exist.

## 2019-05-01 LAB
ALBUMIN SERPL-MCNC: 4.6 G/DL (ref 3.5–5.2)
ALBUMIN/GLOB SERPL: 1.5 G/DL
ALP SERPL-CCNC: 77 U/L (ref 39–117)
ALT SERPL-CCNC: 24 U/L (ref 1–41)
ANA SER QL: NEGATIVE
AST SERPL-CCNC: 20 U/L (ref 1–40)
BASOPHILS # BLD AUTO: 0.02 10*3/MM3 (ref 0–0.2)
BASOPHILS NFR BLD AUTO: 0.3 % (ref 0–1.5)
BILIRUB SERPL-MCNC: 0.5 MG/DL (ref 0.2–1.2)
BUN SERPL-MCNC: 23 MG/DL (ref 8–23)
BUN/CREAT SERPL: 22.8 (ref 7–25)
CALCIUM SERPL-MCNC: 10 MG/DL (ref 8.6–10.5)
CCP IGA+IGG SERPL IA-ACNC: 6 UNITS (ref 0–19)
CHLORIDE SERPL-SCNC: 102 MMOL/L (ref 98–107)
CO2 SERPL-SCNC: 24.8 MMOL/L (ref 22–29)
CREAT SERPL-MCNC: 1.01 MG/DL (ref 0.76–1.27)
CRP SERPL-MCNC: 0.15 MG/DL (ref 0–0.5)
EOSINOPHIL # BLD AUTO: 0.12 10*3/MM3 (ref 0–0.4)
EOSINOPHIL NFR BLD AUTO: 1.7 % (ref 0.3–6.2)
ERYTHROCYTE [DISTWIDTH] IN BLOOD BY AUTOMATED COUNT: 13.5 % (ref 12.3–15.4)
ERYTHROCYTE [SEDIMENTATION RATE] IN BLOOD BY WESTERGREN METHOD: 15 MM/HR (ref 0–20)
GLOBULIN SER CALC-MCNC: 3.1 GM/DL
GLUCOSE SERPL-MCNC: 118 MG/DL (ref 65–99)
HCT VFR BLD AUTO: 44.8 % (ref 37.5–51)
HGB BLD-MCNC: 14.3 G/DL (ref 13–17.7)
HLA-B27 QL NAA+PROBE: NEGATIVE
IMM GRANULOCYTES # BLD AUTO: 0.02 10*3/MM3 (ref 0–0.05)
IMM GRANULOCYTES NFR BLD AUTO: 0.3 % (ref 0–0.5)
LYMPHOCYTES # BLD AUTO: 1.95 10*3/MM3 (ref 0.7–3.1)
LYMPHOCYTES NFR BLD AUTO: 27.4 % (ref 19.6–45.3)
MCH RBC QN AUTO: 30.2 PG (ref 26.6–33)
MCHC RBC AUTO-ENTMCNC: 31.9 G/DL (ref 31.5–35.7)
MCV RBC AUTO: 94.5 FL (ref 79–97)
MONOCYTES # BLD AUTO: 0.36 10*3/MM3 (ref 0.1–0.9)
MONOCYTES NFR BLD AUTO: 5.1 % (ref 5–12)
NEUTROPHILS # BLD AUTO: 4.65 10*3/MM3 (ref 1.7–7)
NEUTROPHILS NFR BLD AUTO: 65.2 % (ref 42.7–76)
NRBC BLD AUTO-RTO: 0 /100 WBC (ref 0–0.2)
PLATELET # BLD AUTO: 169 10*3/MM3 (ref 140–450)
POTASSIUM SERPL-SCNC: 4.5 MMOL/L (ref 3.5–5.2)
PROT SERPL-MCNC: 7.7 G/DL (ref 6–8.5)
RBC # BLD AUTO: 4.74 10*6/MM3 (ref 4.14–5.8)
RHEUMATOID FACT SERPL-ACNC: <10 IU/ML (ref 0–13.9)
SODIUM SERPL-SCNC: 142 MMOL/L (ref 136–145)
URATE SERPL-MCNC: 6.3 MG/DL (ref 3.4–7)
WBC # BLD AUTO: 7.12 10*3/MM3 (ref 3.4–10.8)

## 2019-05-03 LAB
CHOLEST SERPL-MCNC: 142 MG/DL (ref 0–200)
HDLC SERPL-MCNC: 25 MG/DL (ref 40–60)
LDLC SERPL CALC-MCNC: ABNORMAL MG/DL
Lab: NORMAL
TRIGL SERPL-MCNC: 453 MG/DL (ref 0–150)
VLDLC SERPL CALC-MCNC: ABNORMAL MG/DL
WRITTEN AUTHORIZATION: NORMAL

## 2019-05-08 RX ORDER — ESCITALOPRAM OXALATE 20 MG/1
TABLET ORAL
Qty: 90 TABLET | Refills: 0 | Status: SHIPPED | OUTPATIENT
Start: 2019-05-08 | End: 2019-07-03 | Stop reason: SDUPTHER

## 2019-05-08 RX ORDER — ROSUVASTATIN CALCIUM 40 MG/1
TABLET, COATED ORAL
Qty: 90 TABLET | Refills: 0 | Status: SHIPPED | OUTPATIENT
Start: 2019-05-08 | End: 2019-07-03 | Stop reason: SDUPTHER

## 2019-05-17 DIAGNOSIS — M51.36 DDD (DEGENERATIVE DISC DISEASE), LUMBAR: ICD-10-CM

## 2019-05-17 DIAGNOSIS — M79.604 BILATERAL LEG PAIN: ICD-10-CM

## 2019-05-17 DIAGNOSIS — M79.605 BILATERAL LEG PAIN: ICD-10-CM

## 2019-05-20 DIAGNOSIS — R10.31 RIGHT INGUINAL PAIN: ICD-10-CM

## 2019-05-20 DIAGNOSIS — M51.36 DDD (DEGENERATIVE DISC DISEASE), LUMBAR: Primary | ICD-10-CM

## 2019-05-31 ENCOUNTER — TELEPHONE (OUTPATIENT)
Dept: NEUROSURGERY | Facility: CLINIC | Age: 59
End: 2019-05-31

## 2019-05-31 NOTE — TELEPHONE ENCOUNTER
I called and spoke with the patient and he has not received the packet yet.  The patient stated he doesn't receive it early next week, he will either call or come by for another packet.

## 2019-06-21 ENCOUNTER — OFFICE VISIT (OUTPATIENT)
Dept: NEUROSURGERY | Facility: CLINIC | Age: 59
End: 2019-06-21

## 2019-06-21 VITALS
BODY MASS INDEX: 35.46 KG/M2 | HEIGHT: 68 IN | HEART RATE: 60 BPM | DIASTOLIC BLOOD PRESSURE: 76 MMHG | WEIGHT: 234 LBS | SYSTOLIC BLOOD PRESSURE: 124 MMHG

## 2019-06-21 DIAGNOSIS — M51.36 DDD (DEGENERATIVE DISC DISEASE), LUMBAR: ICD-10-CM

## 2019-06-21 DIAGNOSIS — M48.062 SPINAL STENOSIS, LUMBAR REGION, WITH NEUROGENIC CLAUDICATION: Primary | ICD-10-CM

## 2019-06-21 PROBLEM — M51.369 DDD (DEGENERATIVE DISC DISEASE), LUMBAR: Status: ACTIVE | Noted: 2019-06-21

## 2019-06-21 PROCEDURE — 99244 OFF/OP CNSLTJ NEW/EST MOD 40: CPT | Performed by: PHYSICIAN ASSISTANT

## 2019-06-21 NOTE — PROGRESS NOTES
Subjective   Patient ID: Familia Duke is a 58 y.o. male is being seen for consultation today at the request of Khurram Kunz MD  For back and R>L leg pain. He had previous back surgery in 1982 by Dr. Walter.  He denies any recent cause or injury.  He tried facet injections, epidurals and medication management.  He states KARINA and facet injections helped for a short period of time. Mr. Duke takes otc Ibuprofen 600 mg prn for pain.     Leg Pain    The incident occurred more than 1 week ago. There was no injury mechanism. The pain is present in the left leg and right leg (R>L ). The quality of the pain is described as aching, stabbing and shooting. The pain is at a severity of 8/10. The pain is severe. The pain has been intermittent since onset. Associated symptoms include muscle weakness, numbness and tingling. Exacerbated by: sitting with legs raised  He has tried heat (KARINA, facet injections) for the symptoms.       The following portions of the patient's history were reviewed and updated as appropriate: allergies, current medications, past family history, past medical history, past social history, past surgical history and problem list.    Review of Systems   Genitourinary: Negative for difficulty urinating.   Musculoskeletal: Positive for arthralgias. Negative for back pain.   Neurological: Positive for tingling and numbness. Negative for weakness.   All other systems reviewed and are negative.      Objective   Physical Exam   Constitutional: He is oriented to person, place, and time. He appears well-developed and well-nourished.   HENT:   Head: Normocephalic and atraumatic.   Right Ear: External ear normal.   Left Ear: External ear normal.   Eyes: Conjunctivae and EOM are normal. Pupils are equal, round, and reactive to light. Right eye exhibits no discharge. Left eye exhibits no discharge.   Neck: Normal range of motion. Neck supple. No tracheal deviation present.   Pulmonary/Chest: Effort normal. No  stridor. No respiratory distress.   Musculoskeletal: Normal range of motion. He exhibits no edema, tenderness or deformity.   Neurological: He is alert and oriented to person, place, and time. He has normal strength and normal reflexes. He displays no atrophy, no tremor and normal reflexes. No cranial nerve deficit or sensory deficit. He exhibits normal muscle tone. He displays a negative Romberg sign. He displays no seizure activity. Coordination and gait normal.   No long tract signs   Skin: Skin is warm and dry.   Psychiatric: He has a normal mood and affect. His behavior is normal. Judgment and thought content normal.   Nursing note and vitals reviewed.    Neurologic Exam     Mental Status   Oriented to person, place, and time.     Cranial Nerves     CN III, IV, VI   Pupils are equal, round, and reactive to light.  Extraocular motions are normal.     Motor Exam     Strength   Strength 5/5 throughout.       Assessment/Plan   Independent Review of Radiographic Studies:    I did review the lumbar spine MRI done on May 17, 2019 at [facility] an open MRI without contrast.  It does show multilevel degenerative disc disease with a degenerative anterolisthesis at L3-L4.  There is fairly severe spinal stenosis at L3-L4 as well as right sided disc bulging at L4-L5 with impingement on the right L4 nerve root.  At L5-S1 there is also at least moderate canal narrowing and fairly severe foraminal narrowing.  He did also have an MRI of the pelvis that same day which showed only mild DJD of the hips.  Medical Decision Making:    Mr. Duke was referred to us by Dr. Kunz for a 2-year history of low back pain with right greater than left posterior lateral leg pain and right groin and anterolateral thigh pain.  He has a history of previous back surgery in 1982 with Dr. Walter. The back pain is extremely mild but the radicular symptoms are most severe.  It is described as a constant burning/stabbing type pain that worsens with any  prolonged activity or standing or walking and improves only when laying down.  He has had physical therapy, facet injections, epidurals.  The physical therapy did not help and the injections offered 1 to 2 weeks of relief only.  He admits to a generalized sense of heaviness in his legs but nothing focal.  No incontinence.    His exam does not reveal any focal weakness.  Has a negative Jovon sign.    I reviewed the MRI findings with the patient.  He understands that his symptoms are certainly secondary to the spinal stenosis.  The radicular symptoms are most severe and has failed to improve with any conservative management.  I think it is reasonable to consider surgery and recommended a myelogram to further delineate the degree of nerve compression from L3-S1.  He is aware of the risks of bleeding, infection and headache.  He will follow-up thereafter with Dr. Velasquez to discuss surgical options.  Familia was seen today for leg pain.    Diagnoses and all orders for this visit:    Spinal stenosis, lumbar region, with neurogenic claudication  -     IR Myelogram Lumbar Spine; Future  -     CT Lumbar Spine With Intrathecal Contrast; Future  -     XR Spine Lumbar Complete With Flex & Ext; Future  -     No Lab Testing Needed; Standing    DDD (degenerative disc disease), lumbar      Return for follow up after radiology test with Dr. Velasquez.

## 2019-07-03 ENCOUNTER — APPOINTMENT (OUTPATIENT)
Dept: GENERAL RADIOLOGY | Facility: HOSPITAL | Age: 59
End: 2019-07-03

## 2019-07-03 ENCOUNTER — HOSPITAL ENCOUNTER (INPATIENT)
Facility: HOSPITAL | Age: 59
LOS: 3 days | Discharge: HOME | End: 2019-07-06
Attending: EMERGENCY MEDICINE | Admitting: HOSPITALIST

## 2019-07-03 DIAGNOSIS — S22.000A CLOSED COMPRESSION FRACTURE OF THORACIC VERTEBRA, INITIAL ENCOUNTER (HCC): Primary | ICD-10-CM

## 2019-07-03 DIAGNOSIS — M54.12 ACUTE CERVICAL RADICULOPATHY: ICD-10-CM
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
## 2019-07-03 DIAGNOSIS — R26.2 DIFFICULTY WALKING: ICD-10-CM

## 2019-07-03 PROBLEM — M48.061 SPINAL STENOSIS OF LUMBAR REGION: Chronic | Status: ACTIVE | Noted: 2019-07-03

## 2019-07-03 PROBLEM — N18.2 STAGE 2 CHRONIC KIDNEY DISEASE: Chronic | Status: ACTIVE | Noted: 2019-07-03

## 2019-07-03 LAB
ALBUMIN SERPL-MCNC: 4.8 G/DL (ref 3.5–5.2)
ALBUMIN/GLOB SERPL: 1.5 G/DL
ALP SERPL-CCNC: 71 U/L (ref 39–117)
ALT SERPL W P-5'-P-CCNC: 21 U/L (ref 1–41)
ANION GAP SERPL CALCULATED.3IONS-SCNC: 11.4 MMOL/L (ref 5–15)
AST SERPL-CCNC: 25 U/L (ref 1–40)
BASOPHILS # BLD AUTO: 0.02 10*3/MM3 (ref 0–0.2)
BASOPHILS NFR BLD AUTO: 0.2 % (ref 0–1.5)
BILIRUB SERPL-MCNC: 0.6 MG/DL (ref 0.2–1.2)
BUN BLD-MCNC: 20 MG/DL (ref 8–23)
BUN/CREAT SERPL: 17.7 (ref 7–25)
CALCIUM SPEC-SCNC: 9.6 MG/DL (ref 8.6–10.5)
CHLORIDE SERPL-SCNC: 106 MMOL/L (ref 98–107)
CO2 SERPL-SCNC: 25.6 MMOL/L (ref 22–29)
CREAT BLD-MCNC: 1.13 MG/DL (ref 0.76–1.27)
DEPRECATED RDW RBC AUTO: 42.8 FL (ref 37–54)
EOSINOPHIL # BLD AUTO: 0.12 10*3/MM3 (ref 0–0.4)
EOSINOPHIL NFR BLD AUTO: 1.5 % (ref 0.3–6.2)
ERYTHROCYTE [DISTWIDTH] IN BLOOD BY AUTOMATED COUNT: 13.2 % (ref 12.3–15.4)
GFR SERPL CREATININE-BSD FRML MDRD: 66 ML/MIN/1.73
GLOBULIN UR ELPH-MCNC: 3.1 GM/DL
GLUCOSE BLD-MCNC: 89 MG/DL (ref 65–99)
HCT VFR BLD AUTO: 45.8 % (ref 37.5–51)
HGB BLD-MCNC: 14.9 G/DL (ref 13–17.7)
IMM GRANULOCYTES # BLD AUTO: 0.03 10*3/MM3 (ref 0–0.05)
IMM GRANULOCYTES NFR BLD AUTO: 0.4 % (ref 0–0.5)
LYMPHOCYTES # BLD AUTO: 2.19 10*3/MM3 (ref 0.7–3.1)
LYMPHOCYTES NFR BLD AUTO: 26.8 % (ref 19.6–45.3)
MCH RBC QN AUTO: 29.4 PG (ref 26.6–33)
MCHC RBC AUTO-ENTMCNC: 32.5 G/DL (ref 31.5–35.7)
MCV RBC AUTO: 90.5 FL (ref 79–97)
MONOCYTES # BLD AUTO: 0.43 10*3/MM3 (ref 0.1–0.9)
MONOCYTES NFR BLD AUTO: 5.3 % (ref 5–12)
NEUTROPHILS # BLD AUTO: 5.37 10*3/MM3 (ref 1.7–7)
NEUTROPHILS NFR BLD AUTO: 65.8 % (ref 42.7–76)
NRBC BLD AUTO-RTO: 0 /100 WBC (ref 0–0.2)
PLATELET # BLD AUTO: 158 10*3/MM3 (ref 140–450)
PMV BLD AUTO: 11.2 FL (ref 6–12)
POTASSIUM BLD-SCNC: 4.3 MMOL/L (ref 3.5–5.2)
PROT SERPL-MCNC: 7.9 G/DL (ref 6–8.5)
RBC # BLD AUTO: 5.06 10*6/MM3 (ref 4.14–5.8)
SODIUM BLD-SCNC: 143 MMOL/L (ref 136–145)
TROPONIN T SERPL-MCNC: <0.01 NG/ML (ref 0–0.03)
WBC NRBC COR # BLD: 8.16 10*3/MM3 (ref 3.4–10.8)

## 2019-07-03 PROCEDURE — 25010000002 MORPHINE PER 10 MG: Performed by: INTERNAL MEDICINE

## 2019-07-03 PROCEDURE — 99253 IP/OBS CNSLTJ NEW/EST LOW 45: CPT | Performed by: NURSE PRACTITIONER

## 2019-07-03 PROCEDURE — 99284 EMERGENCY DEPT VISIT MOD MDM: CPT

## 2019-07-03 PROCEDURE — 93005 ELECTROCARDIOGRAM TRACING: CPT | Performed by: EMERGENCY MEDICINE

## 2019-07-03 PROCEDURE — 93010 ELECTROCARDIOGRAM REPORT: CPT | Performed by: INTERNAL MEDICINE

## 2019-07-03 PROCEDURE — 80053 COMPREHEN METABOLIC PANEL: CPT | Performed by: EMERGENCY MEDICINE

## 2019-07-03 PROCEDURE — 72050 X-RAY EXAM NECK SPINE 4/5VWS: CPT

## 2019-07-03 PROCEDURE — 84484 ASSAY OF TROPONIN QUANT: CPT | Performed by: INTERNAL MEDICINE

## 2019-07-03 PROCEDURE — 85025 COMPLETE CBC W/AUTO DIFF WBC: CPT | Performed by: EMERGENCY MEDICINE

## 2019-07-03 PROCEDURE — 71046 X-RAY EXAM CHEST 2 VIEWS: CPT

## 2019-07-03 PROCEDURE — 72072 X-RAY EXAM THORAC SPINE 3VWS: CPT

## 2019-07-03 RX ORDER — ROSUVASTATIN CALCIUM 40 MG/1
40 TABLET, COATED ORAL DAILY
Status: DISCONTINUED | OUTPATIENT
Start: 2019-07-04 | End: 2019-07-06 | Stop reason: HOSPADM

## 2019-07-03 RX ORDER — BISACODYL 10 MG
10 SUPPOSITORY, RECTAL RECTAL DAILY PRN
Status: DISCONTINUED | OUTPATIENT
Start: 2019-07-03 | End: 2019-07-06 | Stop reason: HOSPADM

## 2019-07-03 RX ORDER — DOCUSATE SODIUM 100 MG/1
100 CAPSULE, LIQUID FILLED ORAL 2 TIMES DAILY
Status: DISCONTINUED | OUTPATIENT
Start: 2019-07-03 | End: 2019-07-06 | Stop reason: HOSPADM

## 2019-07-03 RX ORDER — NALOXONE HCL 0.4 MG/ML
0.4 VIAL (ML) INJECTION
Status: DISCONTINUED | OUTPATIENT
Start: 2019-07-03 | End: 2019-07-06 | Stop reason: HOSPADM

## 2019-07-03 RX ORDER — MORPHINE SULFATE 2 MG/ML
2 INJECTION, SOLUTION INTRAMUSCULAR; INTRAVENOUS
Status: DISCONTINUED | OUTPATIENT
Start: 2019-07-03 | End: 2019-07-04

## 2019-07-03 RX ORDER — HYDROCODONE BITARTRATE AND ACETAMINOPHEN 7.5; 325 MG/1; MG/1
1 TABLET ORAL ONCE
Status: COMPLETED | OUTPATIENT
Start: 2019-07-03 | End: 2019-07-03

## 2019-07-03 RX ORDER — ONDANSETRON 2 MG/ML
4 INJECTION INTRAMUSCULAR; INTRAVENOUS EVERY 6 HOURS PRN
Status: DISCONTINUED | OUTPATIENT
Start: 2019-07-03 | End: 2019-07-06 | Stop reason: HOSPADM

## 2019-07-03 RX ORDER — ONDANSETRON 4 MG/1
4 TABLET, FILM COATED ORAL EVERY 6 HOURS PRN
Status: DISCONTINUED | OUTPATIENT
Start: 2019-07-03 | End: 2019-07-06 | Stop reason: HOSPADM

## 2019-07-03 RX ORDER — SODIUM CHLORIDE 0.9 % (FLUSH) 0.9 %
10 SYRINGE (ML) INJECTION AS NEEDED
Status: DISCONTINUED | OUTPATIENT
Start: 2019-07-03 | End: 2019-07-06 | Stop reason: HOSPADM

## 2019-07-03 RX ORDER — SODIUM CHLORIDE 0.9 % (FLUSH) 0.9 %
3 SYRINGE (ML) INJECTION EVERY 12 HOURS SCHEDULED
Status: DISCONTINUED | OUTPATIENT
Start: 2019-07-03 | End: 2019-07-06 | Stop reason: HOSPADM

## 2019-07-03 RX ORDER — PANTOPRAZOLE SODIUM 40 MG/1
40 TABLET, DELAYED RELEASE ORAL EVERY MORNING
Status: DISCONTINUED | OUTPATIENT
Start: 2019-07-04 | End: 2019-07-06 | Stop reason: HOSPADM

## 2019-07-03 RX ORDER — ESCITALOPRAM OXALATE 20 MG/1
20 TABLET ORAL DAILY
Status: DISCONTINUED | OUTPATIENT
Start: 2019-07-04 | End: 2019-07-06 | Stop reason: HOSPADM

## 2019-07-03 RX ORDER — NITROGLYCERIN 0.4 MG/1
0.4 TABLET SUBLINGUAL
Status: DISCONTINUED | OUTPATIENT
Start: 2019-07-03 | End: 2019-07-05

## 2019-07-03 RX ORDER — CALCIUM CARBONATE 200(500)MG
1 TABLET,CHEWABLE ORAL 2 TIMES DAILY PRN
Status: DISCONTINUED | OUTPATIENT
Start: 2019-07-03 | End: 2019-07-06 | Stop reason: HOSPADM

## 2019-07-03 RX ORDER — ACETAMINOPHEN 325 MG/1
650 TABLET ORAL EVERY 6 HOURS PRN
Status: DISCONTINUED | OUTPATIENT
Start: 2019-07-03 | End: 2019-07-06 | Stop reason: HOSPADM

## 2019-07-03 RX ADMIN — SODIUM CHLORIDE, PRESERVATIVE FREE 3 ML: 5 INJECTION INTRAVENOUS at 20:05

## 2019-07-03 RX ADMIN — ACETAMINOPHEN 650 MG: 325 TABLET, FILM COATED ORAL at 20:05

## 2019-07-03 RX ADMIN — DOCUSATE SODIUM 100 MG: 100 CAPSULE, LIQUID FILLED ORAL at 21:40

## 2019-07-03 RX ADMIN — MORPHINE SULFATE 2 MG: 2 INJECTION, SOLUTION INTRAMUSCULAR; INTRAVENOUS at 20:05

## 2019-07-03 RX ADMIN — HYDROCODONE BITARTRATE AND ACETAMINOPHEN 1 TABLET: 7.5; 325 TABLET ORAL at 13:32

## 2019-07-03 RX ADMIN — MORPHINE SULFATE 2 MG: 2 INJECTION, SOLUTION INTRAMUSCULAR; INTRAVENOUS at 23:30
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
## 2019-07-04 ENCOUNTER — APPOINTMENT (OUTPATIENT)
Dept: MRI IMAGING | Facility: HOSPITAL | Age: 59
End: 2019-07-04

## 2019-07-04 ENCOUNTER — APPOINTMENT (OUTPATIENT)
Dept: GENERAL RADIOLOGY | Facility: HOSPITAL | Age: 59
End: 2019-07-04

## 2019-07-04 LAB
ANION GAP SERPL CALCULATED.3IONS-SCNC: 10.4 MMOL/L (ref 5–15)
BASOPHILS # BLD AUTO: 0.02 10*3/MM3 (ref 0–0.2)
BASOPHILS NFR BLD AUTO: 0.2 % (ref 0–1.5)
BUN BLD-MCNC: 19 MG/DL (ref 8–23)
BUN/CREAT SERPL: 16.8 (ref 7–25)
CALCIUM SPEC-SCNC: 9.2 MG/DL (ref 8.6–10.5)
CHLORIDE SERPL-SCNC: 104 MMOL/L (ref 98–107)
CO2 SERPL-SCNC: 24.6 MMOL/L (ref 22–29)
CREAT BLD-MCNC: 1.13 MG/DL (ref 0.76–1.27)
DEPRECATED RDW RBC AUTO: 43.9 FL (ref 37–54)
EOSINOPHIL # BLD AUTO: 0.15 10*3/MM3 (ref 0–0.4)
EOSINOPHIL NFR BLD AUTO: 1.9 % (ref 0.3–6.2)
ERYTHROCYTE [DISTWIDTH] IN BLOOD BY AUTOMATED COUNT: 13.2 % (ref 12.3–15.4)
GFR SERPL CREATININE-BSD FRML MDRD: 66 ML/MIN/1.73
GLUCOSE BLD-MCNC: 113 MG/DL (ref 65–99)
HCT VFR BLD AUTO: 39 % (ref 37.5–51)
HGB BLD-MCNC: 12.9 G/DL (ref 13–17.7)
LYMPHOCYTES # BLD AUTO: 2.82 10*3/MM3 (ref 0.7–3.1)
LYMPHOCYTES NFR BLD AUTO: 34.8 % (ref 19.6–45.3)
MAGNESIUM SERPL-MCNC: 2.2 MG/DL (ref 1.6–2.4)
MCH RBC QN AUTO: 30.4 PG (ref 26.6–33)
MCHC RBC AUTO-ENTMCNC: 33.1 G/DL (ref 31.5–35.7)
MCV RBC AUTO: 91.8 FL (ref 79–97)
MONOCYTES # BLD AUTO: 0.5 10*3/MM3 (ref 0.1–0.9)
MONOCYTES NFR BLD AUTO: 6.2 % (ref 5–12)
NEUTROPHILS # BLD AUTO: 4.58 10*3/MM3 (ref 1.7–7)
NEUTROPHILS NFR BLD AUTO: 56.5 % (ref 42.7–76)
PLATELET # BLD AUTO: 135 10*3/MM3 (ref 140–450)
PMV BLD AUTO: 11.1 FL (ref 6–12)
POTASSIUM BLD-SCNC: 4 MMOL/L (ref 3.5–5.2)
RBC # BLD AUTO: 4.25 10*6/MM3 (ref 4.14–5.8)
SODIUM BLD-SCNC: 139 MMOL/L (ref 136–145)
TROPONIN T SERPL-MCNC: <0.01 NG/ML (ref 0–0.03)
TSH SERPL DL<=0.05 MIU/L-ACNC: 8.07 MIU/ML (ref 0.27–4.2)
WBC NRBC COR # BLD: 8.1 10*3/MM3 (ref 3.4–10.8)

## 2019-07-04 PROCEDURE — 25010000002 ONDANSETRON PER 1 MG: Performed by: INTERNAL MEDICINE

## 2019-07-04 PROCEDURE — 72146 MRI CHEST SPINE W/O DYE: CPT

## 2019-07-04 PROCEDURE — 83735 ASSAY OF MAGNESIUM: CPT | Performed by: INTERNAL MEDICINE

## 2019-07-04 PROCEDURE — 84443 ASSAY THYROID STIM HORMONE: CPT | Performed by: INTERNAL MEDICINE

## 2019-07-04 PROCEDURE — 73030 X-RAY EXAM OF SHOULDER: CPT

## 2019-07-04 PROCEDURE — 72141 MRI NECK SPINE W/O DYE: CPT

## 2019-07-04 PROCEDURE — 85025 COMPLETE CBC W/AUTO DIFF WBC: CPT | Performed by: INTERNAL MEDICINE

## 2019-07-04 PROCEDURE — 99232 SBSQ HOSP IP/OBS MODERATE 35: CPT | Performed by: NEUROLOGICAL SURGERY

## 2019-07-04 PROCEDURE — 25010000002 MORPHINE PER 10 MG: Performed by: INTERNAL MEDICINE

## 2019-07-04 PROCEDURE — 84484 ASSAY OF TROPONIN QUANT: CPT | Performed by: INTERNAL MEDICINE

## 2019-07-04 PROCEDURE — 80048 BASIC METABOLIC PNL TOTAL CA: CPT | Performed by: INTERNAL MEDICINE

## 2019-07-04 RX ORDER — CYCLOBENZAPRINE HCL 10 MG
5 TABLET ORAL 3 TIMES DAILY PRN
Status: DISCONTINUED | OUTPATIENT
Start: 2019-07-04 | End: 2019-07-06 | Stop reason: HOSPADM

## 2019-07-04 RX ORDER — HYDROCODONE BITARTRATE AND ACETAMINOPHEN 7.5; 325 MG/1; MG/1
1 TABLET ORAL EVERY 4 HOURS PRN
Status: DISCONTINUED | OUTPATIENT
Start: 2019-07-04 | End: 2019-07-06 | Stop reason: HOSPADM

## 2019-07-04 RX ORDER — LIDOCAINE 50 MG/G
2 PATCH TOPICAL
Status: DISCONTINUED | OUTPATIENT
Start: 2019-07-04 | End: 2019-07-06 | Stop reason: HOSPADM

## 2019-07-04 RX ORDER — HYDROCODONE BITARTRATE AND ACETAMINOPHEN 7.5; 325 MG/1; MG/1
1 TABLET ORAL ONCE
Status: COMPLETED | OUTPATIENT
Start: 2019-07-04 | End: 2019-07-04

## 2019-07-04 RX ADMIN — MORPHINE SULFATE 2 MG: 2 INJECTION, SOLUTION INTRAMUSCULAR; INTRAVENOUS at 02:00

## 2019-07-04 RX ADMIN — ROSUVASTATIN CALCIUM 40 MG: 40 TABLET, FILM COATED ORAL at 09:56

## 2019-07-04 RX ADMIN — ESCITALOPRAM OXALATE 20 MG: 20 TABLET, FILM COATED ORAL at 09:56

## 2019-07-04 RX ADMIN — ACETAMINOPHEN 650 MG: 325 TABLET, FILM COATED ORAL at 02:00

## 2019-07-04 RX ADMIN — HYDROCODONE BITARTRATE AND ACETAMINOPHEN 1 TABLET: 7.5; 325 TABLET ORAL at 06:00

## 2019-07-04 RX ADMIN — PANTOPRAZOLE SODIUM 40 MG: 40 TABLET, DELAYED RELEASE ORAL at 06:00

## 2019-07-04 RX ADMIN — LIDOCAINE 2 PATCH: 50 PATCH TOPICAL at 18:30

## 2019-07-04 RX ADMIN — HYDROCODONE BITARTRATE AND ACETAMINOPHEN 1 TABLET: 7.5; 325 TABLET ORAL at 15:59

## 2019-07-04 RX ADMIN — DOCUSATE SODIUM 100 MG: 100 CAPSULE, LIQUID FILLED ORAL at 09:56

## 2019-07-04 RX ADMIN — HYDROCODONE BITARTRATE AND ACETAMINOPHEN 1 TABLET: 7.5; 325 TABLET ORAL at 20:37

## 2019-07-04 RX ADMIN — SODIUM CHLORIDE, PRESERVATIVE FREE 3 ML: 5 INJECTION INTRAVENOUS at 09:56

## 2019-07-04 RX ADMIN — CYCLOBENZAPRINE 5 MG: 10 TABLET, FILM COATED ORAL at 16:55

## 2019-07-04 RX ADMIN — HYDROCODONE BITARTRATE AND ACETAMINOPHEN 1 TABLET: 7.5; 325 TABLET ORAL at 11:39

## 2019-07-04 RX ADMIN — MORPHINE SULFATE 2 MG: 2 INJECTION, SOLUTION INTRAMUSCULAR; INTRAVENOUS at 09:55

## 2019-07-04 RX ADMIN — DOCUSATE SODIUM 100 MG: 100 CAPSULE, LIQUID FILLED ORAL at 20:37

## 2019-07-04 RX ADMIN — ONDANSETRON HYDROCHLORIDE 4 MG: 2 SOLUTION INTRAMUSCULAR; INTRAVENOUS at 10:01

## 2019-07-04 NOTE — PLAN OF CARE
Problem: Patient Care Overview  Goal: Plan of Care Review  Outcome: Ongoing (interventions implemented as appropriate)   07/04/19 7953   Coping/Psychosocial   Plan of Care Reviewed With patient   Plan of Care Review   Progress no change   OTHER   Outcome Summary A+Ox4. Still on bedrest orders per neurosurgery. De Dios catheter inserted for acute retention and mobility restriction. Started on norco q4 for pain and flexeril. Waiting on distribution for sling for left arm. Patient had x-ray of shoulder today. States positioning and ice packs help pain control. Waiting on Dr. Emanuel to review MRI of thoracic and cervical spine. Continue to monitor and progress towards goals as tolerated.
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
## 2019-07-05 ENCOUNTER — APPOINTMENT (OUTPATIENT)
Dept: MRI IMAGING | Facility: HOSPITAL | Age: 59
End: 2019-07-05

## 2019-07-05 PROCEDURE — 97110 THERAPEUTIC EXERCISES: CPT

## 2019-07-05 PROCEDURE — 97162 PT EVAL MOD COMPLEX 30 MIN: CPT

## 2019-07-05 PROCEDURE — 99232 SBSQ HOSP IP/OBS MODERATE 35: CPT | Performed by: NURSE PRACTITIONER

## 2019-07-05 PROCEDURE — 72148 MRI LUMBAR SPINE W/O DYE: CPT

## 2019-07-05 RX ORDER — METHOCARBAMOL 750 MG/1
750 TABLET, FILM COATED ORAL ONCE
Status: COMPLETED | OUTPATIENT
Start: 2019-07-05 | End: 2019-07-05

## 2019-07-05 RX ORDER — TAMSULOSIN HYDROCHLORIDE 0.4 MG/1
0.4 CAPSULE ORAL NIGHTLY
Status: DISCONTINUED | OUTPATIENT
Start: 2019-07-05 | End: 2019-07-06 | Stop reason: HOSPADM

## 2019-07-05 RX ADMIN — HYDROCODONE BITARTRATE AND ACETAMINOPHEN 1 TABLET: 7.5; 325 TABLET ORAL at 20:44

## 2019-07-05 RX ADMIN — LIDOCAINE 2 PATCH: 50 PATCH TOPICAL at 08:00

## 2019-07-05 RX ADMIN — CYCLOBENZAPRINE 5 MG: 10 TABLET, FILM COATED ORAL at 15:15

## 2019-07-05 RX ADMIN — HYDROCODONE BITARTRATE AND ACETAMINOPHEN 1 TABLET: 7.5; 325 TABLET ORAL at 07:51

## 2019-07-05 RX ADMIN — TAMSULOSIN HYDROCHLORIDE 0.4 MG: 0.4 CAPSULE ORAL at 20:44

## 2019-07-05 RX ADMIN — ESCITALOPRAM OXALATE 20 MG: 20 TABLET, FILM COATED ORAL at 08:00

## 2019-07-05 RX ADMIN — CYCLOBENZAPRINE 5 MG: 10 TABLET, FILM COATED ORAL at 06:44

## 2019-07-05 RX ADMIN — HYDROCODONE BITARTRATE AND ACETAMINOPHEN 1 TABLET: 7.5; 325 TABLET ORAL at 15:15

## 2019-07-05 RX ADMIN — DOCUSATE SODIUM 100 MG: 100 CAPSULE, LIQUID FILLED ORAL at 08:00

## 2019-07-05 RX ADMIN — PANTOPRAZOLE SODIUM 40 MG: 40 TABLET, DELAYED RELEASE ORAL at 06:44

## 2019-07-05 RX ADMIN — DOCUSATE SODIUM 100 MG: 100 CAPSULE, LIQUID FILLED ORAL at 20:44

## 2019-07-05 RX ADMIN — ROSUVASTATIN CALCIUM 40 MG: 40 TABLET, FILM COATED ORAL at 08:00

## 2019-07-05 RX ADMIN — SODIUM CHLORIDE, PRESERVATIVE FREE 3 ML: 5 INJECTION INTRAVENOUS at 20:44

## 2019-07-05 RX ADMIN — CYCLOBENZAPRINE 5 MG: 10 TABLET, FILM COATED ORAL at 20:44

## 2019-07-05 RX ADMIN — SODIUM CHLORIDE, PRESERVATIVE FREE 3 ML: 5 INJECTION INTRAVENOUS at 08:00

## 2019-07-05 RX ADMIN — METHOCARBAMOL TABLETS 750 MG: 500 TABLET, COATED ORAL at 22:00
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
## 2019-07-05 NOTE — PROGRESS NOTES
Discharge Planning Assessment  [facility]     Patient Name: Familia Duke  MRN: [id number]  Today's Date: 7/5/2019    Admit Date: 7/3/2019    Discharge Needs Assessment     Row Name 07/05/19 1122       Living Environment    Lives With  spouse    Name(s) of Who Lives With Patient  Spouse: Sharonda Duke    Current Living Arrangements  home/apartment/condo    Primary Care Provided by  self    Provides Primary Care For  no one    Family Caregiver if Needed  spouse    Quality of Family Relationships  helpful;involved;supportive    Able to Return to Prior Arrangements  yes       Resource/Environmental Concerns    Resource/Environmental Concerns  none       Transition Planning    Patient/Family Anticipates Transition to  home with family    Transportation Anticipated  family or friend will provide       Discharge Needs Assessment    Concerns to be Addressed  no discharge needs identified;denies needs/concerns at this time    Equipment Currently Used at Home  grab bar;bipap/cpap;bath bench    Anticipated Changes Related to Illness  none    Equipment Needed After Discharge  none    Offered/Gave Vendor List  no Denies needs at this time        Discharge Plan     Row Name 07/05/19 1127       Plan    Plan  Home with wife    Patient/Family in Agreement with Plan  yes    Plan Comments  Introduced self and role of CCP. Wife Sharonda Duke [phone] at bedside. Patient agreeable to discuss with all present. Facesheet verified. Patient lives in a one story house with basement. There are 12 steps to enter the front, 3 steps to enter the back and 6 steps to enter from gagarge.  Handrails present on both sides on all.  There are 10 steps to enter the basement with handrails on both sides. Patient states he does not have to go down there. Master bath has a walk-in shower with built-in bench. Garb bars are present in shower and by toilet. Patient has been using CPAP at  for approx 16 years. Prior to admission, patient was  independent with ADL's, continued to drive and works full time. Plan at discharge is to go home with wife. Denies any needs/equipment at this time. Wife to provide transportation.                Demographic Summary     Row Name 07/05/19 1121       General Information    Admission Type  inpatient    Arrived From  home    Reason for Consult  discharge planning    Preferred Language  English     Used During This Interaction  no       Contact Information    Permission Granted to Share Info With  family/designee        Functional Status     Row Name 07/05/19 1121       Functional Status    Usual Activity Tolerance  good    Current Activity Tolerance  moderate       Functional Status, IADL    Medications  independent    Meal Preparation  independent    Housekeeping  independent    Laundry  independent    Shopping  independent       Mental Status    General Appearance WDL  WDL        Psychosocial     Row Name 07/05/19 1122       Values/Beliefs    Spiritual, Cultural Beliefs, Congregation Practices, Values that Affect Care  no       Behavior WDL    Behavior WDL  WDL       Emotion Mood WDL    Emotion/Mood/Affect WDL  WDL       Speech WDL    Speech WDL  WDL       Perceptual State WDL    Perceptual State WDL  WDL       Thought Process WDL    Thought Process WDL  WDL       Intellectual Performance WDL    Level of Consciousness  Alert       Coping/Stress    Sources of Support  spouse    Reaction to Health Status  accepting;adjusting    Understanding of Condition and Treatment  adequate understanding of medical condition;adequate understanding of treatment       Developmental Stage (Eriksson's)    Developmental Stage  Stage 7 (35-65 years/Middle Adulthood) Generativity vs. Stagnation        Abuse/Neglect     Row Name 07/05/19 1122       Personal Safety    Feels Unsafe at Home or Work/School  no    Feels Threatened by Someone  no    Does Anyone Try to Keep You From Having Contact with Others or Doing Things Outside Your  Home?  no    Physical Signs of Abuse Present  no            Deedee Merrill, RN

## 2019-07-05 NOTE — PROGRESS NOTES
Continued Stay Note  [facility]     Patient Name: Familia Duke  MRN: [id number]  Today's Date: 7/5/2019    Admit Date: 7/3/2019    Discharge Plan     Row Name 07/05/19 1438       Plan    Plan  Home with OP PT (no referral made)    Patient/Family in Agreement with Plan  yes    Plan Comments  Patient wants to return to their OP PT close to home.         Discharge Codes    No documentation.             Deedee Merrill RN
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
## 2019-07-06 VITALS
DIASTOLIC BLOOD PRESSURE: 61 MMHG | HEART RATE: 77 BPM | RESPIRATION RATE: 18 BRPM | HEIGHT: 68 IN | SYSTOLIC BLOOD PRESSURE: 104 MMHG | BODY MASS INDEX: 34.86 KG/M2 | TEMPERATURE: 98.3 F | WEIGHT: 230 LBS | OXYGEN SATURATION: 96 %

## 2019-07-06 RX ORDER — ACETAMINOPHEN 325 MG/1
650 TABLET ORAL EVERY 6 HOURS PRN
Start: 2019-07-06 | End: 2020-06-17

## 2019-07-06 RX ORDER — HYDROCODONE BITARTRATE AND ACETAMINOPHEN 7.5; 325 MG/1; MG/1
1 TABLET ORAL EVERY 4 HOURS PRN
Qty: 20 TABLET | Refills: 0
Start: 2019-07-06 | End: 2019-07-14

## 2019-07-06 RX ORDER — TAMSULOSIN HYDROCHLORIDE 0.4 MG/1
0.4 CAPSULE ORAL NIGHTLY
Qty: 30 CAPSULE
Start: 2019-07-06 | End: 2021-03-12

## 2019-07-06 RX ORDER — METHOCARBAMOL 500 MG/1
500 TABLET, FILM COATED ORAL 4 TIMES DAILY
Start: 2019-07-06 | End: 2019-08-29

## 2019-07-06 RX ADMIN — SODIUM CHLORIDE, PRESERVATIVE FREE 3 ML: 5 INJECTION INTRAVENOUS at 08:34

## 2019-07-06 RX ADMIN — DOCUSATE SODIUM 100 MG: 100 CAPSULE, LIQUID FILLED ORAL at 08:33

## 2019-07-06 RX ADMIN — PANTOPRAZOLE SODIUM 40 MG: 40 TABLET, DELAYED RELEASE ORAL at 06:53

## 2019-07-06 RX ADMIN — LIDOCAINE 2 PATCH: 50 PATCH TOPICAL at 08:33

## 2019-07-06 RX ADMIN — ESCITALOPRAM OXALATE 20 MG: 20 TABLET, FILM COATED ORAL at 08:33

## 2019-07-06 RX ADMIN — ROSUVASTATIN CALCIUM 40 MG: 40 TABLET, FILM COATED ORAL at 08:33

## 2019-07-06 RX ADMIN — HYDROCODONE BITARTRATE AND ACETAMINOPHEN 1 TABLET: 7.5; 325 TABLET ORAL at 06:53

## 2019-07-06 NOTE — PLAN OF CARE
Problem: Patient Care Overview  Goal: Plan of Care Review  Outcome: Ongoing (interventions implemented as appropriate)   07/06/19 0433   Coping/Psychosocial   Plan of Care Reviewed With patient;spouse   Plan of Care Review   Progress improving   OTHER   Outcome Summary AVSS. AXOX4. Pain well controlled with Robaxin. Arm in sling. MRI negative for acute fracture. Will continue to monitor.        Problem: Pain, Chronic (Adult)  Goal: Acceptable Pain/Comfort Level and Functional Ability  Outcome: Ongoing (interventions implemented as appropriate)   07/06/19 0433   Pain, Chronic (Adult)   Acceptable Pain/Comfort Level and Functional Ability making progress toward outcome       Problem: Skin Injury Risk (Adult)  Goal: Skin Health and Integrity  Outcome: Ongoing (interventions implemented as appropriate)   07/06/19 0433   Skin Injury Risk (Adult)   Skin Health and Integrity making progress toward outcome       Problem: Fall Risk (Adult)  Goal: Absence of Fall  Outcome: Ongoing (interventions implemented as appropriate)   07/06/19 0433   Fall Risk (Adult)   Absence of Fall making progress toward outcome
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
## 2019-07-06 NOTE — PLAN OF CARE
Problem: Patient Care Overview  Goal: Plan of Care Review  Outcome: Ongoing (interventions implemented as appropriate)  Spoke with Dr Emanuel, who agreed with discharge pending outpatient follow up.

## 2019-07-07 ENCOUNTER — READMISSION MANAGEMENT (OUTPATIENT)
Dept: CALL CENTER | Facility: HOSPITAL | Age: 59
End: 2019-07-07

## 2019-07-07 ENCOUNTER — NURSE TRIAGE (OUTPATIENT)
Dept: CALL CENTER | Facility: HOSPITAL | Age: 59
End: 2019-07-07

## 2019-07-07 ENCOUNTER — HOSPITAL ENCOUNTER (EMERGENCY)
Facility: HOSPITAL | Age: 59
Discharge: HOME | End: 2019-07-07
Attending: EMERGENCY MEDICINE | Admitting: EMERGENCY MEDICINE

## 2019-07-07 VITALS
BODY MASS INDEX: 34.86 KG/M2 | TEMPERATURE: 99.4 F | HEART RATE: 74 BPM | HEIGHT: 68 IN | DIASTOLIC BLOOD PRESSURE: 69 MMHG | RESPIRATION RATE: 16 BRPM | OXYGEN SATURATION: 93 % | SYSTOLIC BLOOD PRESSURE: 130 MMHG

## 2019-07-07 DIAGNOSIS — R31.9 HEMATURIA, UNSPECIFIED TYPE: Primary | ICD-10-CM

## 2019-07-07 DIAGNOSIS — Z97.8 FOLEY CATHETER IN PLACE: ICD-10-CM

## 2019-07-07 LAB
BACTERIA UR QL AUTO: NORMAL /HPF
BILIRUB UR QL STRIP: NEGATIVE
CLARITY UR: CLEAR
COLOR UR: ABNORMAL
GLUCOSE UR STRIP-MCNC: NEGATIVE MG/DL
HGB UR QL STRIP.AUTO: NEGATIVE
HYALINE CASTS UR QL AUTO: NORMAL /LPF
KETONES UR QL STRIP: NEGATIVE
LEUKOCYTE ESTERASE UR QL STRIP.AUTO: ABNORMAL
NITRITE UR QL STRIP: NEGATIVE
PH UR STRIP.AUTO: 6 [PH] (ref 5–8)
PROT UR QL STRIP: NEGATIVE
RBC # UR: NORMAL /HPF
REF LAB TEST METHOD: NORMAL
SP GR UR STRIP: <=1.005 (ref 1–1.03)
SQUAMOUS #/AREA URNS HPF: NORMAL /HPF
UROBILINOGEN UR QL STRIP: ABNORMAL
WBC UR QL AUTO: NORMAL /HPF

## 2019-07-07 PROCEDURE — 99283 EMERGENCY DEPT VISIT LOW MDM: CPT

## 2019-07-07 PROCEDURE — 81001 URINALYSIS AUTO W/SCOPE: CPT | Performed by: NURSE PRACTITIONER

## 2019-07-07 PROCEDURE — 51798 US URINE CAPACITY MEASURE: CPT

## 2019-07-07 RX ORDER — METHOCARBAMOL 750 MG/1
750 TABLET, FILM COATED ORAL ONCE
Status: COMPLETED | OUTPATIENT
Start: 2019-07-07 | End: 2019-07-07

## 2019-07-07 RX ORDER — HYDROCODONE BITARTRATE AND ACETAMINOPHEN 7.5; 325 MG/1; MG/1
1 TABLET ORAL ONCE
Status: COMPLETED | OUTPATIENT
Start: 2019-07-07 | End: 2019-07-07

## 2019-07-07 RX ADMIN — METHOCARBAMOL TABLETS 750 MG: 750 TABLET, COATED ORAL at 17:03

## 2019-07-07 RX ADMIN — HYDROCODONE BITARTRATE AND ACETAMINOPHEN 1 TABLET: 7.5; 325 TABLET ORAL at 17:03

## 2019-07-07 NOTE — OUTREACH NOTE
Prep Survey      Responses   Facility patient discharged from?  [facility]   Is patient eligible?  Yes   Discharge diagnosis  Closed compression fracture of thoracic vertebra    Does the patient have one of the following disease processes/diagnoses(primary or secondary)?  Other   Does the patient have Home health ordered?  No   Is there a DME ordered?  No   Prep survey completed?  Yes          Judith Tapia RN

## 2019-07-07 NOTE — DISCHARGE INSTRUCTIONS
"Use caution so the catheter tube does not get pulled    Slight amount of blood in tubing can be normal as a catheter is a foreign body and insertion is traumatic    Follow up with Dr. Andrade or available provider at [facility] tomorrow per discussion    Return Precautions    Although you are being discharged from the ED today, I encourage you to return for worsening symptoms.  Things can, and do, change such that treatment at home with medication may not be adequate.      Specifically, return for any of the following:    Chest pain, shortness of breath, pain or nausea and vomiting not controlled by medications provided.    Please make a follow up with your Primary Care Provider for a blood pressure recheck.   _____________________________________________________________________  STROKE IS AN EMERGENCY:  Act FAST and check for these signals    Face:   Does the face look uneven?  Arm:   Does one arm drift down?  Speech:  Does their speech sound strange?  Time:   Call 911 for any signs of a stroke    Stroke risk factors:  Atrial fibrillation  Diabetes   Family history of stroke  Heart disease  High cholesterol   Physical inactivity   Obesity   High blood pressure  Smoking      HEART ATTACK INFORMATION:  Symptoms of a heart attack:    Nausea       Sweating, or cold sweat   Feeling of impending doom    Jaw pain  Pain that travels down one or both arms   Shortness of breath  Chest pressure, squeezing or discomfort   Anxiety  Feeling of \"fullness\" in chest      Risk factors for heart attack:    Smoking    High cholesterol  High blood pressure  Family History    Obesity   Lack of exercise  Gender (men higher risk)  Diet   Stress  Age     Diabetes  Excessive alcohol     Cigarette smoking:  Cigarette smoking WILL shorten your life and causes many illnesses.  We recommend you stop smoking.  A free resource is :  1-800 QUIT NOW    National Suicide Prevention Lifeline:  1-459.711.3902  This is a national network of local " crisis centers who provide free and confidential emotional support for people with emotional crisis or emotional distress.    This service is provided 24 hours a day, 7 days a week
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
## 2019-07-08 ENCOUNTER — TELEPHONE (OUTPATIENT)
Dept: NEUROSURGERY | Facility: CLINIC | Age: 59
End: 2019-07-08

## 2019-07-08 DIAGNOSIS — M54.6 ACUTE THORACIC BACK PAIN, UNSPECIFIED BACK PAIN LATERALITY: Primary | ICD-10-CM

## 2019-07-08 DIAGNOSIS — M48.062 SPINAL STENOSIS, LUMBAR REGION, WITH NEUROGENIC CLAUDICATION: ICD-10-CM

## 2019-07-08 NOTE — PROGRESS NOTES
Case Management Discharge Note    Final Note: Discharged home         Final Discharge Disposition Code: 01 - home or self-care
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
## 2019-07-08 NOTE — TELEPHONE ENCOUNTER
Please advise Kacy.  You saw him last,he has appt to see Dr LOREDO 8.7.19    Patient informed that you are not in office today therefore it maybe tomorrow before he gets an answer
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
## 2019-07-09 ENCOUNTER — READMISSION MANAGEMENT (OUTPATIENT)
Dept: CALL CENTER | Facility: HOSPITAL | Age: 59
End: 2019-07-09

## 2019-07-09 NOTE — TELEPHONE ENCOUNTER
Please let patient know that order for PT is in chart. [facility] on first floor of our building does dry needling
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
## 2019-07-09 NOTE — TELEPHONE ENCOUNTER
Patient informed order sent to [facility] and they will contact him with an appt

## 2019-07-16 ENCOUNTER — READMISSION MANAGEMENT (OUTPATIENT)
Dept: CALL CENTER | Facility: HOSPITAL | Age: 59
End: 2019-07-16

## 2019-07-16 NOTE — OUTREACH NOTE
Medical Week 1 Survey      Responses   Facility patient discharged from?  [place]   Does the patient have one of the following disease processes/diagnoses(primary or secondary)?  Other   Call start time  1510   Call end time  1515   Medication alerts for this patient  no changes in medication   Meds reviewed with patient/caregiver?  Yes   Is the patient taking all medications as directed (includes completed medication regime)?  Yes   Has the patient kept scheduled appointments due by today?  N/A   What is the patient's perception of their health status since discharge?  Improving   Wrap up additional comments  doing   well          Keyona Castañeda RN

## 2019-07-19 ENCOUNTER — HOSPITAL ENCOUNTER (OUTPATIENT)
Dept: PHYSICAL THERAPY | Facility: HOSPITAL | Age: 59
Setting detail: THERAPIES SERIES
Discharge: HOME | End: 2019-07-19

## 2019-07-19 DIAGNOSIS — G89.29 CHRONIC MIDLINE THORACIC BACK PAIN: ICD-10-CM

## 2019-07-19 DIAGNOSIS — M48.062 SPINAL STENOSIS OF LUMBAR REGION WITH NEUROGENIC CLAUDICATION: Primary | ICD-10-CM

## 2019-07-19 DIAGNOSIS — M54.6 CHRONIC MIDLINE THORACIC BACK PAIN: ICD-10-CM

## 2019-07-19 PROCEDURE — 97161 PT EVAL LOW COMPLEX 20 MIN: CPT | Performed by: PHYSICAL THERAPIST

## 2019-07-19 PROCEDURE — 97110 THERAPEUTIC EXERCISES: CPT | Performed by: PHYSICAL THERAPIST
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
## 2019-07-23 ENCOUNTER — READMISSION MANAGEMENT (OUTPATIENT)
Dept: CALL CENTER | Facility: HOSPITAL | Age: 59
End: 2019-07-23

## 2019-07-23 NOTE — OUTREACH NOTE
Medical Week 3 Survey      Responses   Facility patient discharged from?  [place]   Does the patient have one of the following disease processes/diagnoses(primary or secondary)?  Other   Week 3 attempt successful?  Yes   Call start time  1352   Call end time  1354   Meds reviewed with patient/caregiver?  Yes   Is the patient taking all medications as directed (includes completed medication regime)?  Yes   Has the patient kept scheduled appointments due by today?  Yes   What is the patient's perception of their health status since discharge?  Improving   Week 3 Call Completed?  Yes          Deedee Akers RN

## 2019-07-24 ENCOUNTER — HOSPITAL ENCOUNTER (OUTPATIENT)
Dept: PHYSICAL THERAPY | Facility: HOSPITAL | Age: 59
Setting detail: THERAPIES SERIES
Discharge: HOME | End: 2019-07-24

## 2019-07-24 DIAGNOSIS — G89.29 CHRONIC MIDLINE THORACIC BACK PAIN: ICD-10-CM

## 2019-07-24 DIAGNOSIS — M54.6 CHRONIC MIDLINE THORACIC BACK PAIN: ICD-10-CM

## 2019-07-24 DIAGNOSIS — M48.062 SPINAL STENOSIS OF LUMBAR REGION WITH NEUROGENIC CLAUDICATION: Primary | ICD-10-CM

## 2019-07-24 PROCEDURE — 97110 THERAPEUTIC EXERCISES: CPT

## 2019-07-24 RX ORDER — ASCORBIC ACID 1000 MG
1 TABLET ORAL DAILY
COMMUNITY
End: 2021-03-22 | Stop reason: SDDI
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
## 2019-07-25 ENCOUNTER — TELEPHONE (OUTPATIENT)
Dept: NEUROSURGERY | Facility: CLINIC | Age: 59
End: 2019-07-25
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
## 2019-07-25 NOTE — TELEPHONE ENCOUNTER
He had an MRI of his cervical, thoracic spine in hospital. It did not show any significant canal narrowing. Please let him know this and ask why he is wanting to include his neck and thoracic spine in myelogram. Is he having arm pain? Or radiating pain across his chest? If so, we would need to see him in office and examine him before we can order it. And even then, I am not sure that he could get those other levels added for the day he is currently scheduled. May not be able to accommodate due to other scheduled studies in that department.

## 2019-07-26 ENCOUNTER — HOSPITAL ENCOUNTER (OUTPATIENT)
Dept: PHYSICAL THERAPY | Facility: HOSPITAL | Age: 59
Setting detail: THERAPIES SERIES
Discharge: HOME | End: 2019-07-26

## 2019-07-26 DIAGNOSIS — M54.6 CHRONIC MIDLINE THORACIC BACK PAIN: ICD-10-CM

## 2019-07-26 DIAGNOSIS — G89.29 CHRONIC MIDLINE THORACIC BACK PAIN: ICD-10-CM

## 2019-07-26 DIAGNOSIS — M48.062 SPINAL STENOSIS OF LUMBAR REGION WITH NEUROGENIC CLAUDICATION: Primary | ICD-10-CM

## 2019-07-26 PROCEDURE — 97110 THERAPEUTIC EXERCISES: CPT
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
## 2019-07-26 NOTE — TELEPHONE ENCOUNTER
Spoke to pt let patient know as discussed ordering additional imaging would most likely not be possible due to the blocked scheduling times that was only made for the lumbar procedure.     I also let him know that we would need to discuss thoracic back pain and symptoms before any imaging can be ordered.     Patient understood.     He is having new intermittent arm pain and midback pain.

## 2019-07-29 ENCOUNTER — HOSPITAL ENCOUNTER (OUTPATIENT)
Dept: GENERAL RADIOLOGY | Facility: HOSPITAL | Age: 59
Discharge: HOME | End: 2019-07-29

## 2019-07-29 ENCOUNTER — HOSPITAL ENCOUNTER (OUTPATIENT)
Dept: CT IMAGING | Facility: HOSPITAL | Age: 59
Discharge: HOME | End: 2019-07-29
Admitting: PHYSICIAN ASSISTANT

## 2019-07-29 VITALS
RESPIRATION RATE: 16 BRPM | HEART RATE: 66 BPM | SYSTOLIC BLOOD PRESSURE: 110 MMHG | BODY MASS INDEX: 34.86 KG/M2 | OXYGEN SATURATION: 97 % | HEIGHT: 68 IN | TEMPERATURE: 98.4 F | DIASTOLIC BLOOD PRESSURE: 64 MMHG | WEIGHT: 230 LBS

## 2019-07-29 DIAGNOSIS — M48.062 SPINAL STENOSIS, LUMBAR REGION, WITH NEUROGENIC CLAUDICATION: ICD-10-CM

## 2019-07-29 PROCEDURE — 72132 CT LUMBAR SPINE W/DYE: CPT

## 2019-07-29 PROCEDURE — 0 IOPAMIDOL 41 % SOLUTION: Performed by: RADIOLOGY

## 2019-07-29 PROCEDURE — 72240 MYELOGRAPHY NECK SPINE: CPT

## 2019-07-29 PROCEDURE — 25010000003 LIDOCAINE 1 % SOLUTION: Performed by: RADIOLOGY

## 2019-07-29 PROCEDURE — 72114 X-RAY EXAM L-S SPINE BENDING: CPT

## 2019-07-29 PROCEDURE — 62304 MYELOGRAPHY LUMBAR INJECTION: CPT

## 2019-07-29 RX ORDER — LIDOCAINE HYDROCHLORIDE 10 MG/ML
10 INJECTION, SOLUTION INFILTRATION; PERINEURAL ONCE
Status: COMPLETED | OUTPATIENT
Start: 2019-07-29 | End: 2019-07-29

## 2019-07-29 RX ORDER — OXYCODONE AND ACETAMINOPHEN 7.5; 325 MG/1; MG/1
1 TABLET ORAL ONCE AS NEEDED
Status: COMPLETED | OUTPATIENT
Start: 2019-07-29 | End: 2019-07-29

## 2019-07-29 RX ORDER — ALPRAZOLAM 1 MG/1
1 TABLET ORAL ONCE
Status: COMPLETED | OUTPATIENT
Start: 2019-07-29 | End: 2019-07-29

## 2019-07-29 RX ADMIN — IOPAMIDOL 20 ML: 408 INJECTION, SOLUTION INTRATHECAL at 09:38

## 2019-07-29 RX ADMIN — LIDOCAINE HYDROCHLORIDE 2 ML: 10 INJECTION, SOLUTION INFILTRATION; PERINEURAL at 09:37

## 2019-07-29 RX ADMIN — ALPRAZOLAM 1 MG: 1 TABLET ORAL at 08:22

## 2019-07-29 RX ADMIN — OXYCODONE HYDROCHLORIDE AND ACETAMINOPHEN 1 TABLET: 7.5; 325 TABLET ORAL at 10:15

## 2019-07-29 NOTE — NURSING NOTE
Patient up and dressed and ambulated to BR without problems.  Patient dressed and was taken to car by W/C with RN.  Wife is driving him home.  No problems or concerns noted.

## 2019-07-29 NOTE — DISCHARGE INSTRUCTIONS
EDUCATION /DISCHARGE INSTRUCTIONS:  A myelogram is a special radiology procedure of the spinal cord, spinal nerves and other related structures.  You will be awake during the examination.  An area of your lower back will be cleansed with an antiseptic solution.  The physician will inject a numbing medication in your lower back.  While your back is numb, a needle will be placed in the lower back area.  A small amount of spinal fluid may be withdrawn and sent to the lab if ordered by your physician. While the needle is in the back, an injection of a contrast material (xray dye) will be given through the needle.  The contrast material will allow the physician to see the spinal cord and spinal nerves.  Once injected, the needle will be removed and a band aid will be placed over the injection site.  The table will be tilted during the process to allow the contrast material to flow to particular areas in the spine.  Following the injection and xrays, you will be taken to the CT scan where more pictures will be taken. After the procedure is finished, the contrast material will be absorbed by your body and eliminated through your kidneys.  The radiologist will study and interpret your myelogram and send the results to your physician.  Procedure risks of a myelogram include, but are not limited to:  *  Bleeding   *  seizure  *  Infection   *  Headache, possibly severe requiring  *  Contrast reaction      a blood patch  *  Nerve or cord injury  *  Paralysis and death  Benefits of the procedure:  *  Best examination for delineating pathology related to spinal cord compression from a disc and/or nerve root compression  Alternatives to the procedure:  MRI - a non invasive procedure requiring intravenous contrast injection.  Cannot be done on patients with certain pacemakers or metal in the body.  MRI risks include possible reaction to the contrast material, movement of metal located in the body.   Benefit to MRI:  Non-invasive  and usually painless procedure.  THIS EDUCATION INFORMATION WAS REVIEWED PRIOR TO THE PROCEDURE AND CONSENT. Patient initials __________________Time_________________    Important information following your myelogram:  *  Lie down with your head elevated no more than 2 pillows for the next 24 hours.   *  Sit up in the car going home.  Sit up to eat and use the restroom only,  for 24 hours.  *  24 HOURS COMPLETE AT ____1100____________________   *  Tomorrow, after 24 hours complete, take it easy and rest.  *  Do not drive for 48 hours following a myelogram  *  You may remove the bandage and shower in the morning  *  Increase your fluids for the next 24 hours.  Caffeinated drinks are encouraged.   Resume taking your blood thinner or Aspirin on _7/30/19 after 1100____    Resume taking antipsychotics/antidepressant on ___7/30/19 after 1100 [Lexapro (esitalopram)] After 1100_________________________  CALL YOUR PHYSICIAN FOR THE FOLLOWING:  * Pain at the injection site  * Reddness, swelling, bruising or drainage at the injection site  * A fever by mouth of 101.0  * Any new symptoms  If you have problems with a headache that is not relieved with rest and medication, please call the Radiology Triage Nurse desk  [phone]

## 2019-07-30 ENCOUNTER — TELEPHONE (OUTPATIENT)
Dept: INTERVENTIONAL RADIOLOGY/VASCULAR | Facility: HOSPITAL | Age: 59
End: 2019-07-30

## 2019-07-30 ENCOUNTER — TELEPHONE (OUTPATIENT)
Dept: NEUROSURGERY | Facility: CLINIC | Age: 59
End: 2019-07-30

## 2019-07-31 ENCOUNTER — HOSPITAL ENCOUNTER (OUTPATIENT)
Dept: PHYSICAL THERAPY | Facility: HOSPITAL | Age: 59
Setting detail: THERAPIES SERIES
Discharge: HOME | End: 2019-07-31

## 2019-07-31 DIAGNOSIS — M54.6 CHRONIC MIDLINE THORACIC BACK PAIN: ICD-10-CM

## 2019-07-31 DIAGNOSIS — G89.29 CHRONIC MIDLINE THORACIC BACK PAIN: ICD-10-CM

## 2019-07-31 DIAGNOSIS — M48.062 SPINAL STENOSIS OF LUMBAR REGION WITH NEUROGENIC CLAUDICATION: Primary | ICD-10-CM

## 2019-08-02 ENCOUNTER — HOSPITAL ENCOUNTER (OUTPATIENT)
Dept: PHYSICAL THERAPY | Facility: HOSPITAL | Age: 59
Setting detail: THERAPIES SERIES
Discharge: HOME | End: 2019-08-02

## 2019-08-02 DIAGNOSIS — M54.6 CHRONIC MIDLINE THORACIC BACK PAIN: ICD-10-CM

## 2019-08-02 DIAGNOSIS — G89.29 CHRONIC MIDLINE THORACIC BACK PAIN: ICD-10-CM

## 2019-08-02 DIAGNOSIS — M48.062 SPINAL STENOSIS OF LUMBAR REGION WITH NEUROGENIC CLAUDICATION: Primary | ICD-10-CM

## 2019-08-02 PROCEDURE — 97110 THERAPEUTIC EXERCISES: CPT
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
## 2019-08-07 ENCOUNTER — OFFICE VISIT (OUTPATIENT)
Dept: NEUROSURGERY | Facility: CLINIC | Age: 59
End: 2019-08-07

## 2019-08-07 ENCOUNTER — TRANSCRIBE ORDERS (OUTPATIENT)
Dept: NEUROSURGERY | Facility: CLINIC | Age: 59
End: 2019-08-07

## 2019-08-07 ENCOUNTER — HOSPITAL ENCOUNTER (OUTPATIENT)
Dept: PHYSICAL THERAPY | Facility: HOSPITAL | Age: 59
Setting detail: THERAPIES SERIES
Discharge: HOME | End: 2019-08-07

## 2019-08-07 VITALS
BODY MASS INDEX: 34.86 KG/M2 | SYSTOLIC BLOOD PRESSURE: 112 MMHG | DIASTOLIC BLOOD PRESSURE: 62 MMHG | WEIGHT: 230 LBS | HEART RATE: 62 BPM | HEIGHT: 68 IN

## 2019-08-07 DIAGNOSIS — G89.29 CHRONIC MIDLINE THORACIC BACK PAIN: ICD-10-CM

## 2019-08-07 DIAGNOSIS — M51.36 DDD (DEGENERATIVE DISC DISEASE), LUMBAR: ICD-10-CM

## 2019-08-07 DIAGNOSIS — M54.6 CHRONIC MIDLINE THORACIC BACK PAIN: ICD-10-CM

## 2019-08-07 DIAGNOSIS — M54.6 CHRONIC LEFT-SIDED THORACIC BACK PAIN: ICD-10-CM

## 2019-08-07 DIAGNOSIS — M48.062 SPINAL STENOSIS, LUMBAR REGION, WITH NEUROGENIC CLAUDICATION: Primary | ICD-10-CM

## 2019-08-07 DIAGNOSIS — M48.062 SPINAL STENOSIS OF LUMBAR REGION WITH NEUROGENIC CLAUDICATION: Primary | ICD-10-CM

## 2019-08-07 DIAGNOSIS — G89.29 CHRONIC LEFT-SIDED THORACIC BACK PAIN: ICD-10-CM

## 2019-08-07 PROCEDURE — 99214 OFFICE O/P EST MOD 30 MIN: CPT | Performed by: NEUROLOGICAL SURGERY

## 2019-08-07 PROCEDURE — 97530 THERAPEUTIC ACTIVITIES: CPT | Performed by: PHYSICAL THERAPIST

## 2019-08-07 PROCEDURE — 97110 THERAPEUTIC EXERCISES: CPT | Performed by: PHYSICAL THERAPIST
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
## 2019-08-09 ENCOUNTER — HOSPITAL ENCOUNTER (OUTPATIENT)
Dept: PHYSICAL THERAPY | Facility: HOSPITAL | Age: 59
Setting detail: THERAPIES SERIES
Discharge: HOME | End: 2019-08-09

## 2019-08-09 DIAGNOSIS — G89.29 CHRONIC MIDLINE THORACIC BACK PAIN: ICD-10-CM

## 2019-08-09 DIAGNOSIS — M48.062 SPINAL STENOSIS OF LUMBAR REGION WITH NEUROGENIC CLAUDICATION: Primary | ICD-10-CM

## 2019-08-09 DIAGNOSIS — M54.6 CHRONIC MIDLINE THORACIC BACK PAIN: ICD-10-CM

## 2019-08-09 PROCEDURE — 97110 THERAPEUTIC EXERCISES: CPT | Performed by: PHYSICAL THERAPIST

## 2019-08-09 PROCEDURE — 97140 MANUAL THERAPY 1/> REGIONS: CPT | Performed by: PHYSICAL THERAPIST
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
## 2019-08-14 ENCOUNTER — HOSPITAL ENCOUNTER (OUTPATIENT)
Dept: PHYSICAL THERAPY | Facility: HOSPITAL | Age: 59
Setting detail: THERAPIES SERIES
Discharge: HOME | End: 2019-08-14

## 2019-08-14 DIAGNOSIS — M48.062 SPINAL STENOSIS OF LUMBAR REGION WITH NEUROGENIC CLAUDICATION: Primary | ICD-10-CM

## 2019-08-14 DIAGNOSIS — G89.29 CHRONIC MIDLINE THORACIC BACK PAIN: ICD-10-CM

## 2019-08-14 DIAGNOSIS — M54.6 CHRONIC MIDLINE THORACIC BACK PAIN: ICD-10-CM

## 2019-08-14 PROCEDURE — 97140 MANUAL THERAPY 1/> REGIONS: CPT | Performed by: PHYSICAL THERAPIST

## 2019-08-14 PROCEDURE — 97110 THERAPEUTIC EXERCISES: CPT | Performed by: PHYSICAL THERAPIST

## 2019-08-14 PROCEDURE — 97012 MECHANICAL TRACTION THERAPY: CPT | Performed by: PHYSICAL THERAPIST
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
## 2019-08-16 ENCOUNTER — HOSPITAL ENCOUNTER (OUTPATIENT)
Dept: PHYSICAL THERAPY | Facility: HOSPITAL | Age: 59
Setting detail: THERAPIES SERIES
Discharge: HOME | End: 2019-08-16

## 2019-08-16 DIAGNOSIS — G89.29 CHRONIC MIDLINE THORACIC BACK PAIN: ICD-10-CM

## 2019-08-16 DIAGNOSIS — M54.6 CHRONIC MIDLINE THORACIC BACK PAIN: ICD-10-CM

## 2019-08-16 DIAGNOSIS — M48.062 SPINAL STENOSIS OF LUMBAR REGION WITH NEUROGENIC CLAUDICATION: Primary | ICD-10-CM

## 2019-08-16 PROCEDURE — 97012 MECHANICAL TRACTION THERAPY: CPT | Performed by: PHYSICAL THERAPIST

## 2019-08-16 PROCEDURE — 97140 MANUAL THERAPY 1/> REGIONS: CPT | Performed by: PHYSICAL THERAPIST
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
## 2019-08-29 ENCOUNTER — APPOINTMENT (OUTPATIENT)
Dept: PREADMISSION TESTING | Facility: HOSPITAL | Age: 59
End: 2019-08-29

## 2019-08-29 VITALS
HEART RATE: 80 BPM | BODY MASS INDEX: 35.16 KG/M2 | OXYGEN SATURATION: 98 % | HEIGHT: 68 IN | DIASTOLIC BLOOD PRESSURE: 77 MMHG | RESPIRATION RATE: 20 BRPM | WEIGHT: 232 LBS | TEMPERATURE: 98.6 F | SYSTOLIC BLOOD PRESSURE: 123 MMHG

## 2019-08-29 LAB
ANION GAP SERPL CALCULATED.3IONS-SCNC: 8.3 MMOL/L (ref 5–15)
APTT PPP: 30.4 SECONDS (ref 22.7–35.4)
BACTERIA UR QL AUTO: ABNORMAL /HPF
BASOPHILS # BLD AUTO: 0.01 10*3/MM3 (ref 0–0.2)
BASOPHILS NFR BLD AUTO: 0.1 % (ref 0–1.5)
BILIRUB UR QL STRIP: NEGATIVE
BUN BLD-MCNC: 17 MG/DL (ref 8–23)
BUN/CREAT SERPL: 15.6 (ref 7–25)
CALCIUM SPEC-SCNC: 9.8 MG/DL (ref 8.6–10.5)
CHLORIDE SERPL-SCNC: 101 MMOL/L (ref 98–107)
CLARITY UR: CLEAR
CO2 SERPL-SCNC: 28.7 MMOL/L (ref 22–29)
COLOR UR: YELLOW
CREAT BLD-MCNC: 1.09 MG/DL (ref 0.76–1.27)
DEPRECATED RDW RBC AUTO: 45.3 FL (ref 37–54)
EOSINOPHIL # BLD AUTO: 0.1 10*3/MM3 (ref 0–0.4)
EOSINOPHIL NFR BLD AUTO: 1.3 % (ref 0.3–6.2)
ERYTHROCYTE [DISTWIDTH] IN BLOOD BY AUTOMATED COUNT: 13.4 % (ref 12.3–15.4)
GFR SERPL CREATININE-BSD FRML MDRD: 69 ML/MIN/1.73
GLUCOSE BLD-MCNC: 137 MG/DL (ref 65–99)
GLUCOSE UR STRIP-MCNC: NEGATIVE MG/DL
HCT VFR BLD AUTO: 43.4 % (ref 37.5–51)
HGB BLD-MCNC: 14.1 G/DL (ref 13–17.7)
HGB UR QL STRIP.AUTO: NEGATIVE
HYALINE CASTS UR QL AUTO: ABNORMAL /LPF
IMM GRANULOCYTES # BLD AUTO: 0.02 10*3/MM3 (ref 0–0.05)
IMM GRANULOCYTES NFR BLD AUTO: 0.3 % (ref 0–0.5)
INR PPP: 1.06 (ref 0.9–1.1)
KETONES UR QL STRIP: NEGATIVE
LEUKOCYTE ESTERASE UR QL STRIP.AUTO: ABNORMAL
LYMPHOCYTES # BLD AUTO: 1.67 10*3/MM3 (ref 0.7–3.1)
LYMPHOCYTES NFR BLD AUTO: 22.4 % (ref 19.6–45.3)
MCH RBC QN AUTO: 29.8 PG (ref 26.6–33)
MCHC RBC AUTO-ENTMCNC: 32.5 G/DL (ref 31.5–35.7)
MCV RBC AUTO: 91.8 FL (ref 79–97)
MONOCYTES # BLD AUTO: 0.38 10*3/MM3 (ref 0.1–0.9)
MONOCYTES NFR BLD AUTO: 5.1 % (ref 5–12)
NEUTROPHILS # BLD AUTO: 5.28 10*3/MM3 (ref 1.7–7)
NEUTROPHILS NFR BLD AUTO: 70.8 % (ref 42.7–76)
NITRITE UR QL STRIP: NEGATIVE
NRBC BLD AUTO-RTO: 0 /100 WBC (ref 0–0.2)
PH UR STRIP.AUTO: 6 [PH] (ref 5–8)
PLATELET # BLD AUTO: 180 10*3/MM3 (ref 140–450)
PMV BLD AUTO: 10 FL (ref 6–12)
POTASSIUM BLD-SCNC: 4.3 MMOL/L (ref 3.5–5.2)
PROT UR QL STRIP: NEGATIVE
PROTHROMBIN TIME: 13.5 SECONDS (ref 11.7–14.2)
RBC # BLD AUTO: 4.73 10*6/MM3 (ref 4.14–5.8)
RBC # UR: ABNORMAL /HPF
REF LAB TEST METHOD: ABNORMAL
SODIUM BLD-SCNC: 138 MMOL/L (ref 136–145)
SP GR UR STRIP: 1.02 (ref 1–1.03)
SQUAMOUS #/AREA URNS HPF: ABNORMAL /HPF
UROBILINOGEN UR QL STRIP: ABNORMAL
WBC NRBC COR # BLD: 7.46 10*3/MM3 (ref 3.4–10.8)
WBC UR QL AUTO: ABNORMAL /HPF

## 2019-08-29 PROCEDURE — 93010 ELECTROCARDIOGRAM REPORT: CPT | Performed by: INTERNAL MEDICINE

## 2019-08-29 PROCEDURE — 85025 COMPLETE CBC W/AUTO DIFF WBC: CPT | Performed by: NEUROLOGICAL SURGERY

## 2019-08-29 PROCEDURE — 81001 URINALYSIS AUTO W/SCOPE: CPT | Performed by: NEUROLOGICAL SURGERY

## 2019-08-29 PROCEDURE — 85730 THROMBOPLASTIN TIME PARTIAL: CPT | Performed by: NEUROLOGICAL SURGERY

## 2019-08-29 PROCEDURE — 36415 COLL VENOUS BLD VENIPUNCTURE: CPT

## 2019-08-29 PROCEDURE — 80048 BASIC METABOLIC PNL TOTAL CA: CPT | Performed by: NEUROLOGICAL SURGERY

## 2019-08-29 PROCEDURE — 93005 ELECTROCARDIOGRAM TRACING: CPT

## 2019-08-29 PROCEDURE — 85610 PROTHROMBIN TIME: CPT | Performed by: NEUROLOGICAL SURGERY

## 2019-08-29 RX ORDER — IBUPROFEN 200 MG
400 TABLET ORAL EVERY 6 HOURS PRN
COMMUNITY
End: 2019-09-10 | Stop reason: HOSPADM

## 2019-08-29 RX ORDER — ROSUVASTATIN CALCIUM 40 MG/1
40 TABLET, COATED ORAL DAILY
COMMUNITY
End: 2019-10-18 | Stop reason: SDUPTHER

## 2019-08-30 ENCOUNTER — APPOINTMENT (OUTPATIENT)
Dept: PHYSICAL THERAPY | Facility: HOSPITAL | Age: 59
End: 2019-08-30

## 2019-09-06 ENCOUNTER — ANESTHESIA EVENT (OUTPATIENT)
Dept: PERIOP | Facility: HOSPITAL | Age: 59
End: 2019-09-06

## 2019-09-06 ENCOUNTER — APPOINTMENT (OUTPATIENT)
Dept: GENERAL RADIOLOGY | Facility: HOSPITAL | Age: 59
End: 2019-09-06

## 2019-09-06 ENCOUNTER — ANESTHESIA (OUTPATIENT)
Dept: PERIOP | Facility: HOSPITAL | Age: 59
End: 2019-09-06

## 2019-09-06 ENCOUNTER — HOSPITAL ENCOUNTER (OUTPATIENT)
Facility: HOSPITAL | Age: 59
Setting detail: OBSERVATION
Discharge: HOME | End: 2019-09-10
Attending: NEUROLOGICAL SURGERY | Admitting: NEUROLOGICAL SURGERY

## 2019-09-06 DIAGNOSIS — M51.36 DDD (DEGENERATIVE DISC DISEASE), LUMBAR: Primary | ICD-10-CM

## 2019-09-06 DIAGNOSIS — M48.062 SPINAL STENOSIS, LUMBAR REGION, WITH NEUROGENIC CLAUDICATION: Chronic | ICD-10-CM

## 2019-09-06 DIAGNOSIS — M48.062 SPINAL STENOSIS OF LUMBAR REGION WITH NEUROGENIC CLAUDICATION: ICD-10-CM

## 2019-09-06 DIAGNOSIS — R33.9 RETENTION OF URINE: ICD-10-CM

## 2019-09-06 PROBLEM — M48.061 SPINAL STENOSIS OF LUMBAR REGION: Status: ACTIVE | Noted: 2019-09-06

## 2019-09-06 PROCEDURE — 25010000003 CEFAZOLIN IN DEXTROSE 2-4 GM/100ML-% SOLUTION: Performed by: NEUROLOGICAL SURGERY

## 2019-09-06 PROCEDURE — 25010000002 FENTANYL CITRATE (PF) 100 MCG/2ML SOLUTION: Performed by: NURSE ANESTHETIST, CERTIFIED REGISTERED

## 2019-09-06 PROCEDURE — G0378 HOSPITAL OBSERVATION PER HR: HCPCS

## 2019-09-06 PROCEDURE — 72020 X-RAY EXAM OF SPINE 1 VIEW: CPT

## 2019-09-06 PROCEDURE — 25010000002 ONDANSETRON PER 1 MG: Performed by: NURSE ANESTHETIST, CERTIFIED REGISTERED

## 2019-09-06 PROCEDURE — 63047 LAM FACETEC & FORAMOT LUMBAR: CPT | Performed by: SPECIALIST/TECHNOLOGIST, OTHER

## 2019-09-06 PROCEDURE — 25010000002 MIDAZOLAM PER 1 MG: Performed by: ANESTHESIOLOGY

## 2019-09-06 PROCEDURE — 63047 LAM FACETEC & FORAMOT LUMBAR: CPT | Performed by: NEUROLOGICAL SURGERY

## 2019-09-06 PROCEDURE — 25010000002 HYDROMORPHONE PER 4 MG: Performed by: NURSE ANESTHETIST, CERTIFIED REGISTERED

## 2019-09-06 PROCEDURE — 25010000002 METHOCARBAMOL 1000 MG/10ML SOLUTION: Performed by: NEUROLOGICAL SURGERY

## 2019-09-06 PROCEDURE — 76000 FLUOROSCOPY <1 HR PHYS/QHP: CPT

## 2019-09-06 PROCEDURE — 63048 LAM FACETEC &FORAMOT EA ADDL: CPT | Performed by: NEUROLOGICAL SURGERY

## 2019-09-06 PROCEDURE — 25010000003 CEFAZOLIN PER 500 MG: Performed by: NEUROLOGICAL SURGERY

## 2019-09-06 PROCEDURE — 25010000002 DEXAMETHASONE PER 1 MG: Performed by: NURSE ANESTHETIST, CERTIFIED REGISTERED

## 2019-09-06 PROCEDURE — 63048 LAM FACETEC &FORAMOT EA ADDL: CPT | Performed by: SPECIALIST/TECHNOLOGIST, OTHER

## 2019-09-06 PROCEDURE — 25010000002 PROPOFOL 10 MG/ML EMULSION: Performed by: NURSE ANESTHETIST, CERTIFIED REGISTERED

## 2019-09-06 PROCEDURE — 25010000002 NEOSTIGMINE PER 0.5 MG: Performed by: NURSE ANESTHETIST, CERTIFIED REGISTERED

## 2019-09-06 PROCEDURE — 25010000002 FENTANYL CITRATE (PF) 100 MCG/2ML SOLUTION: Performed by: ANESTHESIOLOGY

## 2019-09-06 RX ORDER — FENTANYL CITRATE 50 UG/ML
50 INJECTION, SOLUTION INTRAMUSCULAR; INTRAVENOUS
Status: DISCONTINUED | OUTPATIENT
Start: 2019-09-06 | End: 2019-09-06 | Stop reason: HOSPADM

## 2019-09-06 RX ORDER — PROMETHAZINE HYDROCHLORIDE 25 MG/ML
12.5 INJECTION, SOLUTION INTRAMUSCULAR; INTRAVENOUS ONCE AS NEEDED
Status: DISCONTINUED | OUTPATIENT
Start: 2019-09-06 | End: 2019-09-06 | Stop reason: HOSPADM

## 2019-09-06 RX ORDER — FLUMAZENIL 0.1 MG/ML
0.2 INJECTION INTRAVENOUS AS NEEDED
Status: DISCONTINUED | OUTPATIENT
Start: 2019-09-06 | End: 2019-09-06 | Stop reason: HOSPADM

## 2019-09-06 RX ORDER — EPHEDRINE SULFATE 50 MG/ML
5 INJECTION, SOLUTION INTRAVENOUS ONCE AS NEEDED
Status: DISCONTINUED | OUTPATIENT
Start: 2019-09-06 | End: 2019-09-06 | Stop reason: HOSPADM

## 2019-09-06 RX ORDER — HYDROCODONE BITARTRATE AND ACETAMINOPHEN 7.5; 325 MG/1; MG/1
1 TABLET ORAL EVERY 4 HOURS PRN
Status: DISCONTINUED | OUTPATIENT
Start: 2019-09-06 | End: 2019-09-06

## 2019-09-06 RX ORDER — FAMOTIDINE 10 MG/ML
20 INJECTION, SOLUTION INTRAVENOUS ONCE
Status: COMPLETED | OUTPATIENT
Start: 2019-09-06 | End: 2019-09-06

## 2019-09-06 RX ORDER — EPHEDRINE SULFATE 50 MG/ML
INJECTION, SOLUTION INTRAVENOUS AS NEEDED
Status: DISCONTINUED | OUTPATIENT
Start: 2019-09-06 | End: 2019-09-06 | Stop reason: SURG

## 2019-09-06 RX ORDER — DIPHENHYDRAMINE HCL 25 MG
25 CAPSULE ORAL
Status: DISCONTINUED | OUTPATIENT
Start: 2019-09-06 | End: 2019-09-06 | Stop reason: HOSPADM

## 2019-09-06 RX ORDER — HYDROCODONE BITARTRATE AND ACETAMINOPHEN 7.5; 325 MG/1; MG/1
1 TABLET ORAL ONCE AS NEEDED
Status: DISCONTINUED | OUTPATIENT
Start: 2019-09-06 | End: 2019-09-06 | Stop reason: HOSPADM

## 2019-09-06 RX ORDER — SODIUM CHLORIDE 0.9 % (FLUSH) 0.9 %
10 SYRINGE (ML) INJECTION AS NEEDED
Status: DISCONTINUED | OUTPATIENT
Start: 2019-09-06 | End: 2019-09-10 | Stop reason: HOSPADM

## 2019-09-06 RX ORDER — SODIUM CHLORIDE 0.9 % (FLUSH) 0.9 %
3-10 SYRINGE (ML) INJECTION AS NEEDED
Status: DISCONTINUED | OUTPATIENT
Start: 2019-09-06 | End: 2019-09-06 | Stop reason: HOSPADM

## 2019-09-06 RX ORDER — ROCURONIUM BROMIDE 10 MG/ML
INJECTION, SOLUTION INTRAVENOUS AS NEEDED
Status: DISCONTINUED | OUTPATIENT
Start: 2019-09-06 | End: 2019-09-06 | Stop reason: SURG

## 2019-09-06 RX ORDER — CEFAZOLIN SODIUM 2 G/100ML
2 INJECTION, SOLUTION INTRAVENOUS EVERY 8 HOURS
Status: COMPLETED | OUTPATIENT
Start: 2019-09-06 | End: 2019-09-07

## 2019-09-06 RX ORDER — OXYCODONE AND ACETAMINOPHEN 7.5; 325 MG/1; MG/1
1 TABLET ORAL EVERY 4 HOURS PRN
Status: DISCONTINUED | OUTPATIENT
Start: 2019-09-06 | End: 2019-09-10 | Stop reason: HOSPADM

## 2019-09-06 RX ORDER — MIDAZOLAM HYDROCHLORIDE 1 MG/ML
2 INJECTION INTRAMUSCULAR; INTRAVENOUS
Status: DISCONTINUED | OUTPATIENT
Start: 2019-09-06 | End: 2019-09-06 | Stop reason: HOSPADM

## 2019-09-06 RX ORDER — ACETAMINOPHEN 325 MG/1
650 TABLET ORAL EVERY 4 HOURS PRN
Status: DISCONTINUED | OUTPATIENT
Start: 2019-09-06 | End: 2019-09-10 | Stop reason: HOSPADM

## 2019-09-06 RX ORDER — ONDANSETRON 2 MG/ML
4 INJECTION INTRAMUSCULAR; INTRAVENOUS ONCE AS NEEDED
Status: COMPLETED | OUTPATIENT
Start: 2019-09-06 | End: 2019-09-06

## 2019-09-06 RX ORDER — HYDROMORPHONE HYDROCHLORIDE 1 MG/ML
0.5 INJECTION, SOLUTION INTRAMUSCULAR; INTRAVENOUS; SUBCUTANEOUS
Status: DISCONTINUED | OUTPATIENT
Start: 2019-09-06 | End: 2019-09-06 | Stop reason: HOSPADM

## 2019-09-06 RX ORDER — NALOXONE HCL 0.4 MG/ML
0.2 VIAL (ML) INJECTION AS NEEDED
Status: DISCONTINUED | OUTPATIENT
Start: 2019-09-06 | End: 2019-09-06 | Stop reason: HOSPADM

## 2019-09-06 RX ORDER — HYDROMORPHONE HCL IN 0.9% NACL 10 MG/50ML
PATIENT CONTROLLED ANALGESIA SYRINGE INTRAVENOUS CONTINUOUS
Status: DISCONTINUED | OUTPATIENT
Start: 2019-09-06 | End: 2019-09-09

## 2019-09-06 RX ORDER — LIDOCAINE HYDROCHLORIDE 10 MG/ML
0.5 INJECTION, SOLUTION EPIDURAL; INFILTRATION; INTRACAUDAL; PERINEURAL ONCE AS NEEDED
Status: DISCONTINUED | OUTPATIENT
Start: 2019-09-06 | End: 2019-09-06 | Stop reason: HOSPADM

## 2019-09-06 RX ORDER — MIDAZOLAM HYDROCHLORIDE 1 MG/ML
1 INJECTION INTRAMUSCULAR; INTRAVENOUS
Status: DISCONTINUED | OUTPATIENT
Start: 2019-09-06 | End: 2019-09-06 | Stop reason: HOSPADM

## 2019-09-06 RX ORDER — SENNA AND DOCUSATE SODIUM 50; 8.6 MG/1; MG/1
1 TABLET, FILM COATED ORAL NIGHTLY PRN
Status: DISCONTINUED | OUTPATIENT
Start: 2019-09-06 | End: 2019-09-10 | Stop reason: HOSPADM

## 2019-09-06 RX ORDER — GLYCOPYRROLATE 0.2 MG/ML
INJECTION INTRAMUSCULAR; INTRAVENOUS AS NEEDED
Status: DISCONTINUED | OUTPATIENT
Start: 2019-09-06 | End: 2019-09-06 | Stop reason: SURG

## 2019-09-06 RX ORDER — METHOCARBAMOL 100 MG/ML
1000 INJECTION, SOLUTION INTRAMUSCULAR; INTRAVENOUS ONCE
Status: COMPLETED | OUTPATIENT
Start: 2019-09-06 | End: 2019-09-06

## 2019-09-06 RX ORDER — CEFAZOLIN SODIUM 2 G/100ML
2 INJECTION, SOLUTION INTRAVENOUS ONCE
Status: COMPLETED | OUTPATIENT
Start: 2019-09-06 | End: 2019-09-06

## 2019-09-06 RX ORDER — FENTANYL CITRATE 50 UG/ML
INJECTION, SOLUTION INTRAMUSCULAR; INTRAVENOUS AS NEEDED
Status: DISCONTINUED | OUTPATIENT
Start: 2019-09-06 | End: 2019-09-06 | Stop reason: SURG

## 2019-09-06 RX ORDER — SODIUM CHLORIDE, SODIUM LACTATE, POTASSIUM CHLORIDE, CALCIUM CHLORIDE 600; 310; 30; 20 MG/100ML; MG/100ML; MG/100ML; MG/100ML
9 INJECTION, SOLUTION INTRAVENOUS CONTINUOUS
Status: DISCONTINUED | OUTPATIENT
Start: 2019-09-06 | End: 2019-09-06

## 2019-09-06 RX ORDER — PROMETHAZINE HYDROCHLORIDE 25 MG/1
25 SUPPOSITORY RECTAL ONCE AS NEEDED
Status: DISCONTINUED | OUTPATIENT
Start: 2019-09-06 | End: 2019-09-06 | Stop reason: HOSPADM

## 2019-09-06 RX ORDER — OXYCODONE AND ACETAMINOPHEN 7.5; 325 MG/1; MG/1
2 TABLET ORAL EVERY 4 HOURS PRN
Status: DISCONTINUED | OUTPATIENT
Start: 2019-09-06 | End: 2019-09-07

## 2019-09-06 RX ORDER — CYCLOBENZAPRINE HCL 10 MG
10 TABLET ORAL 3 TIMES DAILY PRN
Status: DISCONTINUED | OUTPATIENT
Start: 2019-09-06 | End: 2019-09-07

## 2019-09-06 RX ORDER — SODIUM CHLORIDE 0.9 % (FLUSH) 0.9 %
3 SYRINGE (ML) INJECTION EVERY 12 HOURS SCHEDULED
Status: DISCONTINUED | OUTPATIENT
Start: 2019-09-06 | End: 2019-09-09

## 2019-09-06 RX ORDER — NALOXONE HCL 0.4 MG/ML
0.1 VIAL (ML) INJECTION
Status: DISCONTINUED | OUTPATIENT
Start: 2019-09-06 | End: 2019-09-10 | Stop reason: HOSPADM

## 2019-09-06 RX ORDER — ONDANSETRON 2 MG/ML
4 INJECTION INTRAMUSCULAR; INTRAVENOUS EVERY 6 HOURS PRN
Status: DISCONTINUED | OUTPATIENT
Start: 2019-09-06 | End: 2019-09-07

## 2019-09-06 RX ORDER — ONDANSETRON 4 MG/1
4 TABLET, FILM COATED ORAL EVERY 6 HOURS PRN
Status: DISCONTINUED | OUTPATIENT
Start: 2019-09-06 | End: 2019-09-07

## 2019-09-06 RX ORDER — PROMETHAZINE HYDROCHLORIDE 25 MG/1
25 TABLET ORAL ONCE AS NEEDED
Status: DISCONTINUED | OUTPATIENT
Start: 2019-09-06 | End: 2019-09-06 | Stop reason: HOSPADM

## 2019-09-06 RX ORDER — ESCITALOPRAM OXALATE 20 MG/1
20 TABLET ORAL DAILY
Status: DISCONTINUED | OUTPATIENT
Start: 2019-09-06 | End: 2019-09-10 | Stop reason: HOSPADM

## 2019-09-06 RX ORDER — ACETAMINOPHEN 325 MG/1
650 TABLET ORAL ONCE AS NEEDED
Status: DISCONTINUED | OUTPATIENT
Start: 2019-09-06 | End: 2019-09-06 | Stop reason: HOSPADM

## 2019-09-06 RX ORDER — DIPHENHYDRAMINE HYDROCHLORIDE 50 MG/ML
12.5 INJECTION INTRAMUSCULAR; INTRAVENOUS
Status: DISCONTINUED | OUTPATIENT
Start: 2019-09-06 | End: 2019-09-06 | Stop reason: HOSPADM

## 2019-09-06 RX ORDER — LIDOCAINE HYDROCHLORIDE 20 MG/ML
INJECTION, SOLUTION INFILTRATION; PERINEURAL AS NEEDED
Status: DISCONTINUED | OUTPATIENT
Start: 2019-09-06 | End: 2019-09-06 | Stop reason: SURG

## 2019-09-06 RX ORDER — DEXAMETHASONE SODIUM PHOSPHATE 10 MG/ML
INJECTION INTRAMUSCULAR; INTRAVENOUS AS NEEDED
Status: DISCONTINUED | OUTPATIENT
Start: 2019-09-06 | End: 2019-09-06 | Stop reason: SURG

## 2019-09-06 RX ORDER — OXYCODONE AND ACETAMINOPHEN 7.5; 325 MG/1; MG/1
1 TABLET ORAL ONCE AS NEEDED
Status: DISCONTINUED | OUTPATIENT
Start: 2019-09-06 | End: 2019-09-06 | Stop reason: HOSPADM

## 2019-09-06 RX ORDER — DOCUSATE SODIUM 100 MG/1
100 CAPSULE, LIQUID FILLED ORAL 2 TIMES DAILY PRN
Status: DISCONTINUED | OUTPATIENT
Start: 2019-09-06 | End: 2019-09-10 | Stop reason: HOSPADM

## 2019-09-06 RX ORDER — BUPIVACAINE HYDROCHLORIDE AND EPINEPHRINE 2.5; 5 MG/ML; UG/ML
INJECTION, SOLUTION INFILTRATION; PERINEURAL AS NEEDED
Status: DISCONTINUED | OUTPATIENT
Start: 2019-09-06 | End: 2019-09-06 | Stop reason: HOSPADM

## 2019-09-06 RX ORDER — PROMETHAZINE HYDROCHLORIDE 25 MG/ML
6.25 INJECTION, SOLUTION INTRAMUSCULAR; INTRAVENOUS
Status: DISCONTINUED | OUTPATIENT
Start: 2019-09-06 | End: 2019-09-06 | Stop reason: HOSPADM

## 2019-09-06 RX ORDER — ONDANSETRON 2 MG/ML
INJECTION INTRAMUSCULAR; INTRAVENOUS AS NEEDED
Status: DISCONTINUED | OUTPATIENT
Start: 2019-09-06 | End: 2019-09-06 | Stop reason: SURG

## 2019-09-06 RX ORDER — SODIUM CHLORIDE 0.9 % (FLUSH) 0.9 %
3 SYRINGE (ML) INJECTION EVERY 12 HOURS SCHEDULED
Status: DISCONTINUED | OUTPATIENT
Start: 2019-09-06 | End: 2019-09-06 | Stop reason: HOSPADM

## 2019-09-06 RX ORDER — SODIUM CHLORIDE, SODIUM LACTATE, POTASSIUM CHLORIDE, CALCIUM CHLORIDE 600; 310; 30; 20 MG/100ML; MG/100ML; MG/100ML; MG/100ML
100 INJECTION, SOLUTION INTRAVENOUS CONTINUOUS
Status: DISCONTINUED | OUTPATIENT
Start: 2019-09-06 | End: 2019-09-07

## 2019-09-06 RX ORDER — ROSUVASTATIN CALCIUM 40 MG/1
40 TABLET, COATED ORAL DAILY
Status: DISCONTINUED | OUTPATIENT
Start: 2019-09-06 | End: 2019-09-10 | Stop reason: HOSPADM

## 2019-09-06 RX ORDER — LABETALOL HYDROCHLORIDE 5 MG/ML
5 INJECTION, SOLUTION INTRAVENOUS
Status: DISCONTINUED | OUTPATIENT
Start: 2019-09-06 | End: 2019-09-06 | Stop reason: HOSPADM

## 2019-09-06 RX ORDER — PROPOFOL 10 MG/ML
VIAL (ML) INTRAVENOUS AS NEEDED
Status: DISCONTINUED | OUTPATIENT
Start: 2019-09-06 | End: 2019-09-06 | Stop reason: SURG

## 2019-09-06 RX ORDER — HYDRALAZINE HYDROCHLORIDE 20 MG/ML
5 INJECTION INTRAMUSCULAR; INTRAVENOUS
Status: DISCONTINUED | OUTPATIENT
Start: 2019-09-06 | End: 2019-09-06 | Stop reason: HOSPADM

## 2019-09-06 RX ORDER — PANTOPRAZOLE SODIUM 40 MG/1
40 TABLET, DELAYED RELEASE ORAL EVERY MORNING
Status: DISCONTINUED | OUTPATIENT
Start: 2019-09-06 | End: 2019-09-10 | Stop reason: HOSPADM

## 2019-09-06 RX ORDER — TAMSULOSIN HYDROCHLORIDE 0.4 MG/1
0.4 CAPSULE ORAL NIGHTLY
Status: DISCONTINUED | OUTPATIENT
Start: 2019-09-06 | End: 2019-09-10 | Stop reason: HOSPADM

## 2019-09-06 RX ADMIN — ROCURONIUM BROMIDE 50 MG: 10 INJECTION INTRAVENOUS at 08:11

## 2019-09-06 RX ADMIN — DEXAMETHASONE SODIUM PHOSPHATE 6 MG: 10 INJECTION INTRAMUSCULAR; INTRAVENOUS at 08:40

## 2019-09-06 RX ADMIN — METHOCARBAMOL 1000 MG: 100 INJECTION, SOLUTION INTRAMUSCULAR; INTRAVENOUS at 11:03

## 2019-09-06 RX ADMIN — PROPOFOL 50 MG: 10 INJECTION, EMULSION INTRAVENOUS at 09:27

## 2019-09-06 RX ADMIN — ESCITALOPRAM 20 MG: 20 TABLET, FILM COATED ORAL at 18:12

## 2019-09-06 RX ADMIN — FENTANYL CITRATE 50 MCG: 50 INJECTION INTRAMUSCULAR; INTRAVENOUS at 11:19

## 2019-09-06 RX ADMIN — PROPOFOL 50 MG: 10 INJECTION, EMULSION INTRAVENOUS at 10:38

## 2019-09-06 RX ADMIN — FENTANYL CITRATE 100 MCG: 50 INJECTION, SOLUTION INTRAMUSCULAR; INTRAVENOUS at 08:11

## 2019-09-06 RX ADMIN — FENTANYL CITRATE 50 MCG: 50 INJECTION INTRAMUSCULAR; INTRAVENOUS at 10:58

## 2019-09-06 RX ADMIN — ONDANSETRON 4 MG: 2 INJECTION INTRAMUSCULAR; INTRAVENOUS at 09:43

## 2019-09-06 RX ADMIN — CEFAZOLIN SODIUM 2 G: 2 INJECTION, SOLUTION INTRAVENOUS at 16:10

## 2019-09-06 RX ADMIN — HYDROMORPHONE HYDROCHLORIDE 0.5 MG: 1 INJECTION, SOLUTION INTRAMUSCULAR; INTRAVENOUS; SUBCUTANEOUS at 12:10

## 2019-09-06 RX ADMIN — CEFAZOLIN SODIUM 2 G: 2 INJECTION, SOLUTION INTRAVENOUS at 23:42

## 2019-09-06 RX ADMIN — LIDOCAINE HYDROCHLORIDE 80 MG: 20 INJECTION, SOLUTION INFILTRATION; PERINEURAL at 08:11

## 2019-09-06 RX ADMIN — CEFAZOLIN SODIUM 2 G: 2 INJECTION, SOLUTION INTRAVENOUS at 08:20

## 2019-09-06 RX ADMIN — FENTANYL CITRATE 50 MCG: 50 INJECTION, SOLUTION INTRAMUSCULAR; INTRAVENOUS at 08:42

## 2019-09-06 RX ADMIN — NEOSTIGMINE METHYLSULFATE 3 MG: 1 INJECTION INTRAMUSCULAR; INTRAVENOUS; SUBCUTANEOUS at 10:29

## 2019-09-06 RX ADMIN — HYDROMORPHONE HYDROCHLORIDE: 10 INJECTION INTRAMUSCULAR; INTRAVENOUS; SUBCUTANEOUS at 11:10

## 2019-09-06 RX ADMIN — PROPOFOL 150 MG: 10 INJECTION, EMULSION INTRAVENOUS at 08:11

## 2019-09-06 RX ADMIN — SODIUM CHLORIDE, POTASSIUM CHLORIDE, SODIUM LACTATE AND CALCIUM CHLORIDE 9 ML/HR: 600; 310; 30; 20 INJECTION, SOLUTION INTRAVENOUS at 07:32

## 2019-09-06 RX ADMIN — MIDAZOLAM 1 MG: 1 INJECTION INTRAMUSCULAR; INTRAVENOUS at 07:33

## 2019-09-06 RX ADMIN — OXYCODONE HYDROCHLORIDE AND ACETAMINOPHEN 2 TABLET: 7.5; 325 TABLET ORAL at 21:09

## 2019-09-06 RX ADMIN — ONDANSETRON 4 MG: 2 INJECTION INTRAMUSCULAR; INTRAVENOUS at 10:58

## 2019-09-06 RX ADMIN — OXYCODONE HYDROCHLORIDE AND ACETAMINOPHEN 2 TABLET: 7.5; 325 TABLET ORAL at 14:49

## 2019-09-06 RX ADMIN — SODIUM CHLORIDE, PRESERVATIVE FREE 3 ML: 5 INJECTION INTRAVENOUS at 21:09

## 2019-09-06 RX ADMIN — ROSUVASTATIN CALCIUM 40 MG: 40 TABLET, FILM COATED ORAL at 18:12

## 2019-09-06 RX ADMIN — EPHEDRINE SULFATE 10 MG: 50 INJECTION INTRAMUSCULAR; INTRAVENOUS; SUBCUTANEOUS at 08:32

## 2019-09-06 RX ADMIN — GLYCOPYRROLATE 0.5 MG: 0.2 INJECTION INTRAMUSCULAR; INTRAVENOUS at 10:28

## 2019-09-06 RX ADMIN — FENTANYL CITRATE 100 MCG: 50 INJECTION, SOLUTION INTRAMUSCULAR; INTRAVENOUS at 09:27

## 2019-09-06 RX ADMIN — FAMOTIDINE 20 MG: 10 INJECTION, SOLUTION INTRAVENOUS at 07:33

## 2019-09-06 NOTE — ANESTHESIA PREPROCEDURE EVALUATION
Anesthesia Evaluation     Patient summary reviewed and Nursing notes reviewed   NPO Solid Status: > 8 hours  NPO Liquid Status: > 8 hours           Airway   Mallampati: II  Neck ROM: full  No difficulty expected  Dental - normal exam     Pulmonary    (+) sleep apnea on CPAP,   Cardiovascular     Rhythm: regular    (+) hyperlipidemia,       Neuro/Psych  (+) headaches, numbness, psychiatric history Anxiety,     GI/Hepatic/Renal/Endo    (+) obesity,  GERD,      Musculoskeletal     Abdominal   (+) obese,    Substance History      OB/GYN          Other   (+) arthritis                     Anesthesia Plan    ASA 2     general   (Prone position discussed)  intravenous induction   Anesthetic plan, all risks, benefits, and alternatives have been provided, discussed and informed consent has been obtained with: patient.

## 2019-09-06 NOTE — ANESTHESIA PROCEDURE NOTES
Airway  Urgency: elective    Date/Time: 9/6/2019 8:14 AM  Airway not difficult    General Information and Staff    Patient location during procedure: OR  CRNA: Kateryna Belle CRNA    Indications and Patient Condition  Indications for airway management: airway protection    Preoxygenated: yes  Mask difficulty assessment: 1 - vent by mask    Final Airway Details  Final airway type: endotracheal airway      Successful airway: ETT  Cuffed: yes   Successful intubation technique: direct laryngoscopy  Facilitating devices/methods: anterior pressure/BURP  Endotracheal tube insertion site: oral  Blade: Marline  Blade size: 3  ETT size (mm): 7.5  Cormack-Lehane Classification: grade IIb - view of arytenoids or posterior of glottis only  Placement verified by: chest auscultation and capnometry   Measured from: lips  ETT/EBT  to lips (cm): 23  Number of attempts at approach: 1    Additional Comments   ett cuff up at MOP

## 2019-09-06 NOTE — OP NOTE
Preoperative diagnosis: L3 to S1 spinal stenosis with bilateral neurogenic claudication    Postoperative diagnosis: Same as above    Procedures performed: Open bilateral lumbar decompression L3 to S1    Surgeon: Ron    First Assistant: Majo Moreno  (She greatly assisted in the exposure, visualization of neural structures, control of bleeding, retraction, and closure of the incision.)    Anesthesia: GET    EBL: 100 cc    Complications: none    Specimen sent: none    Drains: 7 mm flat DARRYL epidural drain    Findings: severe stenosis, particularly L3/4    Postoperative condition: good    Indications for the operation: The patient is a 58-year-old gentleman who has had chronic thoracic and low back pain that does not require surgery. However, over the last 1 year or so he has developed bilateral sciatica consistent with neurogenic claudication. He has known spinal stenosis at L3 to S1 which was demonstrated both on MRI and myelogram. He failed conservative treatments, including time, blocks, physical therapy, and medical therapy, and because of that was brought to the operating room for a decompression without fusion today. He understood that the point of the surgery was to help his sciatica and neurogenic claudication, not his back pain.        Informed consent: He understood that the goal of surgery is relief of radiating leg pain with improvement of numbness, tingling, walking, and quality of life.  This will not help or hinder low back pain.  The risks include, but are not limited to, infection, hemorrhage on transfusion or reoperation, CSF leak requiring reoperation, incomplete relief of symptoms, potential need for additional surgeries in the future including a fusion, stroke, paralysis, coma, and death.  The patient agrees to proceed.    Details of the operation: The patient was taken to the operating room and remained in his gurney in the supine position. After induction of endotracheal intubation, Dionne  were placed. Venous access was secured. He was given 2 g of Kefzol as per the SCIP protocol. No De Dios was necessary. No was rolled over in the prone position on a radiolucent Chris spinal table. All pressure points were padded including the brachial plexus. The lumbar region was then prepped and draped in the usual sterile fashion. We did a surgical timeout.  I injected 30 mL of 0.25% Marcaine with epinephrine into the paraspinous musculature from L3 to S1 bilaterally. A linear incision was made from L3 to S1 with a #10 skin knife. Hemostasis was obtained with monopolar cautery. Dissection was taken all the way down to the lumbar fascia, which was divided to the left and to the right with monopolar cautery L3 to S1. A Call periosteal was used to strip the paraspinous musculature bilaterally. Self-retaining retractors were placed. Initial x-rays shows that the towel clip was above L4-L5 and below L5-S1. We made the correction and another x-ray later on showed the Penfield probes in the epidural space above L3-L4 and below L5-S1. The spinous processes were removed at L3, L4, L5, and S1. Using a 6 mm round do, I did a central laminectomy all the way down to the ligamentum flavum at L3-L4, L4-L5, and L5-S1. The microscope was draped and brought onto the field. Its use was essential to the performance of microneurosurgical technique. Using a 3 mm matchstick on the [employer] pneumatic drill, I completed the central laminectomies at L3-L4, L4-L5, and L5-S1 and did medial facetectomies and foraminotomies bilaterally decompressing the L4, L5, and S1 nerve roots respectively. I checked the disk spaces at all levels and on both sides and they were merely bulging and did not need to be incised. Spinal canal was decompressed. No CSF leakage was seen. Antibiotic irrigation was used. Floseal was used. A 7 mm flat Chris-Diaz drain was left in the epidural space and exited through a separate stab incision, secured to the skin  with 2-0 silk. The fascia was reapproximated with 0 Vicryl interrupted suture. The subcutaneous layer was closed with 2-0 interrupted Vicryl suture. The skin was closed with a running 4-0 Vicryl subcuticular. Steri-Strips and a clean, dry dressing were placed. He tolerated the procedure well and was rolled over in the supine position and extubated, taken to the recovery room in satisfactory condition.

## 2019-09-06 NOTE — ANESTHESIA POSTPROCEDURE EVALUATION
Patient: Familia Duke    Procedure Summary     Date:  09/06/19 Room / Location:  [facility] OR [address] [place] [address] MAIN OR    Anesthesia Start:  0806 Anesthesia Stop:  1047    Procedure:  LUMBAR DECOMPRESSION, open bilateral lumbar decompression L3 to S1 (Bilateral Spine Lumbar) Diagnosis:       Spinal stenosis, lumbar region, with neurogenic claudication      (Spinal stenosis, lumbar region, with neurogenic claudication [M48.062])    Surgeon:  Brandon Velasquez MD Provider:  John Guardado MD    Anesthesia Type:  general ASA Status:  2          Anesthesia Type: general  Last vitals  BP   115/70 (09/06/19 1113)   Temp   36.8 °C (98.2 °F) (09/06/19 1043)   Pulse   74 (09/06/19 1113)   Resp   18 (09/06/19 1113)     SpO2   97 % (09/06/19 1113)     Post Anesthesia Care and Evaluation    Patient location during evaluation: PACU  Patient participation: complete - patient participated  Level of consciousness: awake and alert  Pain management: adequate  Airway patency: patent  Anesthetic complications: No anesthetic complications    Cardiovascular status: acceptable  Respiratory status: acceptable  Hydration status: acceptable    Comments: /70   Pulse 74   Temp 36.8 °C (98.2 °F) (Oral)   Resp 18   Wt 106 kg (234 lb 5.6 oz)   SpO2 97%   BMI 35.63 kg/m²

## 2019-09-06 NOTE — BRIEF OP NOTE
LUMBAR LAMINECTOMY DISCECTOMY DECOMPRESSION POSTERIOR 1-2 LEVELS  Progress Note    Familia Duke  9/6/2019    Pre-op Diagnosis:   Spinal stenosis, lumbar region, with neurogenic claudication [M48.062]       Post-Op Diagnosis Codes:     * Spinal stenosis, lumbar region, with neurogenic claudication [M48.062]    Procedure/CPT® Codes:      Procedure(s):  LUMBAR DECOMPRESSION, open bilateral lumbar decompression L3 to S1    Surgeon(s):  Brandon Velasquez MD    Anesthesia: General    Staff:   Cell Saver : Shawna Anderson  Circulator: Parker Amaya RN  Scrub Person: Anabelle Simmons  Assistant: Majo Moreno CSA  Orientee: Anabelle Flynn    Estimated Blood Loss: 100 mL    Urine Voided: 0 mL    Specimens:              none          Drains:   Closed/Suction Drain 1 Back Bulb (Active)       Findings: severe stenosis, particularly at L3/4    Complications: none      Brandon Velasquez MD     Date: 9/6/2019  Time: 10:05 AM

## 2019-09-07 ENCOUNTER — APPOINTMENT (OUTPATIENT)
Dept: GENERAL RADIOLOGY | Facility: HOSPITAL | Age: 59
End: 2019-09-07

## 2019-09-07 PROBLEM — R33.9 RETENTION OF URINE: Status: ACTIVE | Noted: 2019-09-07

## 2019-09-07 LAB
ALBUMIN SERPL-MCNC: 4.3 G/DL (ref 3.5–5.2)
ALBUMIN/GLOB SERPL: 1.5 G/DL
ALP SERPL-CCNC: 71 U/L (ref 39–117)
ALT SERPL W P-5'-P-CCNC: 13 U/L (ref 1–41)
ANION GAP SERPL CALCULATED.3IONS-SCNC: 10.4 MMOL/L (ref 5–15)
AST SERPL-CCNC: 19 U/L (ref 1–40)
BACTERIA UR QL AUTO: ABNORMAL /HPF
BILIRUB SERPL-MCNC: 0.4 MG/DL (ref 0.2–1.2)
BILIRUB UR QL STRIP: NEGATIVE
BUN BLD-MCNC: 16 MG/DL (ref 8–23)
BUN/CREAT SERPL: 13.3 (ref 7–25)
CALCIUM SPEC-SCNC: 9 MG/DL (ref 8.6–10.5)
CHLORIDE SERPL-SCNC: 101 MMOL/L (ref 98–107)
CLARITY UR: CLEAR
CO2 SERPL-SCNC: 27.6 MMOL/L (ref 22–29)
COLOR UR: YELLOW
CREAT BLD-MCNC: 1.2 MG/DL (ref 0.76–1.27)
D-LACTATE SERPL-SCNC: 2.1 MMOL/L (ref 0.5–2)
D-LACTATE SERPL-SCNC: 2.5 MMOL/L (ref 0.5–2)
DEPRECATED RDW RBC AUTO: 48.5 FL (ref 37–54)
ERYTHROCYTE [DISTWIDTH] IN BLOOD BY AUTOMATED COUNT: 13.8 % (ref 12.3–15.4)
GFR SERPL CREATININE-BSD FRML MDRD: 62 ML/MIN/1.73
GLOBULIN UR ELPH-MCNC: 2.9 GM/DL
GLUCOSE BLD-MCNC: 136 MG/DL (ref 65–99)
GLUCOSE BLDC GLUCOMTR-MCNC: 152 MG/DL (ref 70–130)
GLUCOSE BLDC GLUCOMTR-MCNC: 166 MG/DL (ref 70–130)
GLUCOSE UR STRIP-MCNC: NEGATIVE MG/DL
HCT VFR BLD AUTO: 41.9 % (ref 37.5–51)
HGB BLD-MCNC: 13.5 G/DL (ref 13–17.7)
HGB UR QL STRIP.AUTO: NEGATIVE
HOLD SPECIMEN: NORMAL
HYALINE CASTS UR QL AUTO: ABNORMAL /LPF
KETONES UR QL STRIP: NEGATIVE
LEUKOCYTE ESTERASE UR QL STRIP.AUTO: ABNORMAL
MCH RBC QN AUTO: 30.7 PG (ref 26.6–33)
MCHC RBC AUTO-ENTMCNC: 32.2 G/DL (ref 31.5–35.7)
MCV RBC AUTO: 95.2 FL (ref 79–97)
NITRITE UR QL STRIP: NEGATIVE
PH UR STRIP.AUTO: 5.5 [PH] (ref 5–8)
PLATELET # BLD AUTO: 197 10*3/MM3 (ref 140–450)
PMV BLD AUTO: 10.8 FL (ref 6–12)
POTASSIUM BLD-SCNC: 4.7 MMOL/L (ref 3.5–5.2)
PROCALCITONIN SERPL-MCNC: 0.04 NG/ML (ref 0.1–0.25)
PROT SERPL-MCNC: 7.2 G/DL (ref 6–8.5)
PROT UR QL STRIP: NEGATIVE
RBC # BLD AUTO: 4.4 10*6/MM3 (ref 4.14–5.8)
RBC # UR: ABNORMAL /HPF
REF LAB TEST METHOD: ABNORMAL
SODIUM BLD-SCNC: 139 MMOL/L (ref 136–145)
SP GR UR STRIP: 1.01 (ref 1–1.03)
SQUAMOUS #/AREA URNS HPF: ABNORMAL /HPF
UROBILINOGEN UR QL STRIP: ABNORMAL
WBC NRBC COR # BLD: 15.99 10*3/MM3 (ref 3.4–10.8)
WBC UR QL AUTO: ABNORMAL /HPF

## 2019-09-07 PROCEDURE — 80053 COMPREHEN METABOLIC PANEL: CPT | Performed by: NEUROLOGICAL SURGERY

## 2019-09-07 PROCEDURE — 25010000003 CEFTRIAXONE PER 250 MG: Performed by: HOSPITALIST

## 2019-09-07 PROCEDURE — 25010000002 VANCOMYCIN 10 G RECONSTITUTED SOLUTION: Performed by: HOSPITALIST

## 2019-09-07 PROCEDURE — 85027 COMPLETE CBC AUTOMATED: CPT | Performed by: NEUROLOGICAL SURGERY

## 2019-09-07 PROCEDURE — 82962 GLUCOSE BLOOD TEST: CPT

## 2019-09-07 PROCEDURE — 84145 PROCALCITONIN (PCT): CPT | Performed by: HOSPITALIST

## 2019-09-07 PROCEDURE — 94799 UNLISTED PULMONARY SVC/PX: CPT

## 2019-09-07 PROCEDURE — 74018 RADEX ABDOMEN 1 VIEW: CPT

## 2019-09-07 PROCEDURE — 97162 PT EVAL MOD COMPLEX 30 MIN: CPT

## 2019-09-07 PROCEDURE — 51702 INSERT TEMP BLADDER CATH: CPT

## 2019-09-07 PROCEDURE — 81001 URINALYSIS AUTO W/SCOPE: CPT | Performed by: HOSPITALIST

## 2019-09-07 PROCEDURE — G0378 HOSPITAL OBSERVATION PER HR: HCPCS

## 2019-09-07 PROCEDURE — 93010 ELECTROCARDIOGRAM REPORT: CPT | Performed by: INTERNAL MEDICINE

## 2019-09-07 PROCEDURE — 25010000003 CEFAZOLIN IN DEXTROSE 2-4 GM/100ML-% SOLUTION: Performed by: NEUROLOGICAL SURGERY

## 2019-09-07 PROCEDURE — 99024 POSTOP FOLLOW-UP VISIT: CPT | Performed by: NEUROLOGICAL SURGERY

## 2019-09-07 PROCEDURE — 83605 ASSAY OF LACTIC ACID: CPT | Performed by: NEUROLOGICAL SURGERY

## 2019-09-07 PROCEDURE — 71045 X-RAY EXAM CHEST 1 VIEW: CPT

## 2019-09-07 PROCEDURE — 93005 ELECTROCARDIOGRAM TRACING: CPT | Performed by: NEUROLOGICAL SURGERY

## 2019-09-07 PROCEDURE — 97110 THERAPEUTIC EXERCISES: CPT

## 2019-09-07 PROCEDURE — 87086 URINE CULTURE/COLONY COUNT: CPT | Performed by: HOSPITALIST

## 2019-09-07 PROCEDURE — 87040 BLOOD CULTURE FOR BACTERIA: CPT | Performed by: HOSPITALIST

## 2019-09-07 PROCEDURE — 25010000002 ONDANSETRON PER 1 MG: Performed by: NEUROLOGICAL SURGERY

## 2019-09-07 RX ORDER — SODIUM CHLORIDE 9 MG/ML
75 INJECTION, SOLUTION INTRAVENOUS CONTINUOUS
Status: DISCONTINUED | OUTPATIENT
Start: 2019-09-07 | End: 2019-09-09

## 2019-09-07 RX ORDER — CEFTRIAXONE SODIUM 2 G/50ML
2 INJECTION, SOLUTION INTRAVENOUS EVERY 24 HOURS
Status: DISCONTINUED | OUTPATIENT
Start: 2019-09-07 | End: 2019-09-08

## 2019-09-07 RX ADMIN — SODIUM CHLORIDE, PRESERVATIVE FREE 3 ML: 5 INJECTION INTRAVENOUS at 20:55

## 2019-09-07 RX ADMIN — ONDANSETRON 4 MG: 2 INJECTION INTRAMUSCULAR; INTRAVENOUS at 13:28

## 2019-09-07 RX ADMIN — CEFAZOLIN SODIUM 2 G: 2 INJECTION, SOLUTION INTRAVENOUS at 08:30

## 2019-09-07 RX ADMIN — VANCOMYCIN HYDROCHLORIDE 2000 MG: 10 INJECTION, POWDER, LYOPHILIZED, FOR SOLUTION INTRAVENOUS at 18:18

## 2019-09-07 RX ADMIN — SODIUM CHLORIDE 500 ML: 9 INJECTION, SOLUTION INTRAVENOUS at 14:12

## 2019-09-07 RX ADMIN — SODIUM CHLORIDE 125 ML/HR: 9 INJECTION, SOLUTION INTRAVENOUS at 14:55

## 2019-09-07 RX ADMIN — OXYCODONE HYDROCHLORIDE AND ACETAMINOPHEN 2 TABLET: 7.5; 325 TABLET ORAL at 08:31

## 2019-09-07 RX ADMIN — SODIUM CHLORIDE, PRESERVATIVE FREE 3 ML: 5 INJECTION INTRAVENOUS at 08:31

## 2019-09-07 RX ADMIN — CYCLOBENZAPRINE 10 MG: 10 TABLET, FILM COATED ORAL at 08:30

## 2019-09-07 RX ADMIN — OXYCODONE HYDROCHLORIDE AND ACETAMINOPHEN 1 TABLET: 7.5; 325 TABLET ORAL at 19:23

## 2019-09-07 RX ADMIN — PANTOPRAZOLE SODIUM 40 MG: 40 TABLET, DELAYED RELEASE ORAL at 06:17

## 2019-09-07 RX ADMIN — OXYCODONE HYDROCHLORIDE AND ACETAMINOPHEN 2 TABLET: 7.5; 325 TABLET ORAL at 12:27

## 2019-09-07 RX ADMIN — SODIUM CHLORIDE, POTASSIUM CHLORIDE, SODIUM LACTATE AND CALCIUM CHLORIDE 100 ML/HR: 600; 310; 30; 20 INJECTION, SOLUTION INTRAVENOUS at 12:56

## 2019-09-07 RX ADMIN — ESCITALOPRAM 20 MG: 20 TABLET, FILM COATED ORAL at 08:30

## 2019-09-07 RX ADMIN — OXYCODONE HYDROCHLORIDE AND ACETAMINOPHEN 2 TABLET: 7.5; 325 TABLET ORAL at 03:24

## 2019-09-07 RX ADMIN — ROSUVASTATIN CALCIUM 40 MG: 40 TABLET, FILM COATED ORAL at 08:30

## 2019-09-07 RX ADMIN — CEFTRIAXONE SODIUM 2 G: 2 INJECTION, SOLUTION INTRAVENOUS at 17:20

## 2019-09-07 NOTE — PROCEDURES
Insert Temp Indwelling Blad Cath, Simple  Date/Time: 9/7/2019 4:46 PM  Performed by: Daniel White MD  Authorized by: Daniel White MD   Consent: Verbal consent obtained.  Risks and benefits: risks, benefits and alternatives were discussed  Consent given by: patient  Patient understanding: patient states understanding of the procedure being performed  Patient identity confirmed: verbally with patient  Preparation: Patient was prepped and draped in the usual sterile fashion.  Local anesthesia used: yes    Anesthesia:  Local anesthesia used: yes  Local Anesthetic: topical anesthetic    Sedation:  Patient sedated: no    Patient tolerance: Patient tolerated the procedure well with no immediate complications  Comments: 18FR coude tip catheter placed without difficulty.  Clear urine returned.

## 2019-09-07 NOTE — DISCHARGE PLACEMENT REQUEST
"Familia Duke (58 y.o. Male)     Date of Birth Social Security Number Address Home Phone MRN    1960  [address] [facility] [phone] [id number]    S[employer] Marital Status          Non-[employer]        Admission Date Admission Type Admitting Provider Attending Provider Department, Room/Bed    9/6/19 Elective Brandon Velasquez MD Villanueva, Wayne, MD [address], P899/1    Discharge Date Discharge Disposition Discharge Destination                       Attending Provider:  Brandon Velasquez MD    Allergies:  No Known Allergies    Isolation:  None   Infection:  None   Code Status:  CPR    Ht:  172.7 cm (67.99\")   Wt:  106 kg (234 lb 5.6 oz)    Admission Cmt:  None   Principal Problem:  Spinal stenosis, lumbar region, with neurogenic claudication [M48.062]                 Active Insurance as of 9/6/2019     Primary Coverage     Payor Plan Insurance Group Employer/Plan Group    ANTH BLUE CROSS ANTH BLUE CROSS BLUE SHIELD PPO [id number]     Payor Plan Address Payor Plan Phone Number Payor Plan Fax Number Effective Dates    [address] [phone]  1/1/2019 - None Entered    [place] [address]       Subscriber Name Subscriber Birth Date Member ID       EUGENEADRIÁNFAMILIA ESTER 1960 [id number]           Secondary Coverage     Payor Plan Insurance Group Employer/Plan Group    MEDICAID PENDING MEDICAID PENDING      Payor Plan Address Payor Plan Phone Number Payor Plan Fax Number Effective Dates    [facility]   8/9/2019 - None Entered    Subscriber Name Subscriber Birth Date Member ID       JOCELYNFAMILIA ESTER 1960                  Emergency Contacts      (Rel.) Home Phone Work Phone Mobile Phone    Sharonda Duke (Spouse) -- -- [phone]    Stefani Duke (Daughter) -- -- [phone]    Arleen Duke (Daughter) -- -- [phone]              "

## 2019-09-07 NOTE — NURSING NOTE
Attempted to place coude chavez catheter per urology order. Nursing was unable to place due to resistance from prostate. Large amounts of blood in the catheter and absorbant pad were noted. Will notify urologist.

## 2019-09-07 NOTE — SIGNIFICANT NOTE
Patient was unable to void and nursing attempted to do straight catheter to empty bladder per order at approximately 1230. Resistance was felt and large amount of dark blood was noted in straight catheter kit. The catheter was removed without placement or draining of bladder. A blood clot was noted on the end of the catheter, and large amount of serous drainage came out of the urethra once the catheter was removed. The patient then felt nauseous and attempted to sit up at the bedside with assistance of his wife, the nursing assistant was called in to the room and caught the patient as he leaned forward from a sitting position on the bedside. The assistant assumed that the patient lost consciousness but the patient denies this. When nursing entered the room, the patient was still sitting at the bedside and was conscious but lethargic. A rapid response was called. The patient was assisted back in the bed and vital signs were taken. A small amount of new serous drainage was noted from urethra. The patient was tachycardic and had increased respiratory rate but denied difficulty breathing. The on call doctor for Dr. Velasquez was paged and orders received by the rapid response team.  Nursing spoke with the urologist on call, and the urologist told nursing to attempt to place a 18 french coude chavez catheter, and if nursing if unable to place then urology would have to place it. Nursing repeated to the urologist that there was a large amount of bleeding after the attempted straight catheter, but urology still wanted nursing to attempt chavez catheter placement. Nursing attempted to place chavez but was unable to place in bladder. Resistance and dark serous blood was noted in line of chavez catheter. Once the catheter was removed, large amount of blood drained from urethra. The patient was then transferred to telemetry per order from Dr. Bob.

## 2019-09-07 NOTE — PROGRESS NOTES
NEUROSURGERY PROGRESS NOTE     LOS: 0 days   Patient Care Team:  Khurram Kunz MD as PCP - General (Sports Medicine)    Chief Complaint: Back pain with claudication    Subjective     Interval History:     Postop day 1 L3-S1 decompression.  The patient earlier this morning and he was doing well with the exception of difficulty with urination due to a premorbid prostate issue.  He was comfortable at that time and had no symptoms in the lower extremities such as weakness or numbness and felt better than preop with regard to his leg complaints.  His back was sore as expected following surgery.  Had required I&O cath overnight.    Med team was called later in the morning while I was doing emergency surgery due to the failure of nursing to be able to ALIYAH cath him any longer.  Dr. Kailash Marcus came by this morning and placed a De Dios catheter for us.    Patient tells me Dr. Velasquez intends for him to get a brace during the postoperative period before he goes home.    History taken from: patient family    Objective      Vital Signs  Temp:  [96.6 °F (35.9 °C)-98.6 °F (37 °C)] 98.6 °F (37 °C)  Heart Rate:  [] 101  Resp:  [16-22] 18  BP: (113-145)/(65-84) 118/68  Body mass index is 35.64 kg/m².    Intake/Output last 3 shifts:  I/O last 3 completed shifts:  In: 2180 [P.O.:410; I.V.:1670; IV Piggyback:100]  Out: 2400 [Urine:2300; Blood:100]    Intake/Output this shift:  I/O this shift:  In: 440 [P.O.:440]  Out: -     Physical    Alert and oriented x3  When I came back later in the afternoon he was much more uncomfortable due to bladder distention now relieved by Dr. Marcus  Motor normal in both lower extremities  Sensation normal in both lower extremities  Dressing dry  Drain functioning adequately    Results Review:     I reviewed the patient's new clinical results.    Labs:    Lab Results (last 24 hours)     Procedure Component Value Units Date/Time    POC Glucose Once [[id number]]  (Abnormal) Collected:  09/07/19 3583     Specimen:  Blood Updated:  09/07/19 1255     Glucose 152 mg/dL     Comprehensive Metabolic Panel [[id number]]  (Abnormal) Collected:  09/07/19 1322    Specimen:  Blood Updated:  09/07/19 1353     Glucose 136 mg/dL      BUN 16 mg/dL      Creatinine 1.20 mg/dL      Sodium 139 mmol/L      Potassium 4.7 mmol/L      Chloride 101 mmol/L      CO2 27.6 mmol/L      Calcium 9.0 mg/dL      Total Protein 7.2 g/dL      Albumin 4.30 g/dL      ALT (SGPT) 13 U/L      AST (SGOT) 19 U/L      Alkaline Phosphatase 71 U/L      Total Bilirubin 0.4 mg/dL      eGFR Non African Amer 62 mL/min/1.73      Globulin 2.9 gm/dL      A/G Ratio 1.5 g/dL      BUN/Creatinine Ratio 13.3     Anion Gap 10.4 mmol/L     Narrative:       GFR Normal >60  Chronic Kidney Disease <60  Kidney Failure <15    CBC (No Diff) [[id number]]  (Abnormal) Collected:  09/07/19 1322    Specimen:  Blood Updated:  09/07/19 1336     WBC 15.99 10*3/mm3      RBC 4.40 10*6/mm3      Hemoglobin 13.5 g/dL      Hematocrit 41.9 %      MCV 95.2 fL      MCH 30.7 pg      MCHC 32.2 g/dL      RDW 13.8 %      RDW-SD 48.5 fl      MPV 10.8 fL      Platelets 197 10*3/mm3     Lactic Acid, Plasma [[id number]]  (Abnormal) Collected:  09/07/19 1322    Specimen:  Blood Updated:  09/07/19 1352     Lactate 2.1 mmol/L     Lactic Acid, Reflex Timer (This will reflex a repeat order 3-3:15 hours after ordered.) [[id number]] Collected:  09/07/19 1322    Specimen:  Blood Updated:  09/07/19 1700     Extra Tube Hold for add-ons.     Comment: Auto resulted.       Procalcitonin [[id number]]  (Abnormal) Collected:  09/07/19 1322    Specimen:  Blood Updated:  09/07/19 1450     Procalcitonin 0.04 ng/mL     Narrative:       As a Marker for Sepsis (Non-Neonates):   1. <0.5 ng/mL represents a low risk of severe sepsis and/or septic shock.  1. >2 ng/mL represents a high risk of severe sepsis and/or septic shock.    As a Marker for Lower Respiratory Tract Infections that require antibiotic therapy:  PCT on Admission      "Antibiotic Therapy             6-12 Hrs later  > 0.5                Strongly Recommended            >0.25 - <0.5         Recommended  0.1 - 0.25           Discouraged                   Remeasure/reassess PCT  <0.1                 Strongly Discouraged          Remeasure/reassess PCT      As 28 day mortality risk marker: \"Change in Procalcitonin Result\" (> 80 % or <=80 %) if Day 0 (or Day 1) and Day 4 values are available. Refer to http://www.[facility]-pct-calculator.com/   Change in PCT <=80 %   A decrease of PCT levels below or equal to 80 % defines a positive change in PCT test result representing a higher risk for 28-day all-cause mortality of patients diagnosed with severe sepsis or septic shock.  Change in PCT > 80 %   A decrease of PCT levels of more than 80 % defines a negative change in PCT result representing a lower risk for 28-day all-cause mortality of patients diagnosed with severe sepsis or septic shock.                POC Glucose Once [[id number]]  (Abnormal) Collected:  09/07/19 1507    Specimen:  Blood Updated:  09/07/19 1508     Glucose 166 mg/dL     Blood Culture - Blood, Arm, Left [[id number]] Collected:  09/07/19 1620    Specimen:  Blood from Arm, Left Updated:  09/07/19 1701    Blood Culture - Blood, Arm, Right [[id number]] Collected:  09/07/19 1627    Specimen:  Blood from Arm, Right Updated:  09/07/19 1701    Urinalysis With Culture If Indicated - Urine, Random Void [[id number]]  (Abnormal) Collected:  09/07/19 1706    Specimen:  Urine, Random Void Updated:  09/07/19 1729     Color, UA Yellow     Appearance, UA Clear     pH, UA 5.5     Specific Gravity, UA 1.014     Glucose, UA Negative     Ketones, UA Negative     Bilirubin, UA Negative     Blood, UA Negative     Protein, UA Negative     Leuk Esterase, UA Trace     Nitrite, UA Negative     Urobilinogen, UA 0.2 E.U./dL    Urinalysis, Microscopic Only - Urine, Random Void [[id number]]  (Abnormal) Collected:  09/07/19 1706    Specimen:  Urine, Random " Void Updated:  09/07/19 1729     RBC, UA 0-2 /HPF      WBC, UA 6-12 /HPF      Bacteria, UA None Seen /HPF      Squamous Epithelial Cells, UA 0-2 /HPF      Hyaline Casts, UA 0-2 /LPF      Methodology Automated Microscopy    Urine Culture - Urine, Urine, Random Void [[id number]] Collected:  09/07/19 1706    Specimen:  Urine, Random Void Updated:  09/07/19 1729    Lactic Acid, Reflex [[id number]]  (Abnormal) Collected:  09/07/19 1707    Specimen:  Blood Updated:  09/07/19 1729     Lactate 2.5 mmol/L           Imaging:    None new since surgery    Current Medications:   Scheduled Meds:  ceftriaxone 2 g Intravenous Q24H   escitalopram 20 mg Oral Daily   pantoprazole 40 mg Oral QAM   rosuvastatin 40 mg Oral Daily   sodium chloride 3 mL Intravenous Q12H   tamsulosin 0.4 mg Oral Nightly   [START ON 9/8/2019] vancomycin 1,500 mg Intravenous Q12H   vancomycin 20 mg/kg Intravenous Once     Continuous Infusions:  HYDROmorphone HCl-NaCl     Pharmacy to dose vancomycin     sodium chloride 125 mL/hr Last Rate: 125 mL/hr (09/07/19 1657)       Assessment/Plan       Spinal stenosis, lumbar region, with neurogenic claudication    Spinal stenosis of lumbar region      Plan: Appreciate the help from the urology service to get the De Dios catheter in place.  We will mobilize the patient a bit more tomorrow and obviously follow the advice of urology regarding the duration and management of his bladder/prostate.  To the events today will take it easy tonight with pain management and mobilize him with physical therapy tomorrow.  I will arrange for a brace to be ordered the patient requested.      Garry Pfeiffer MD  09/07/19  6:20 PM

## 2019-09-07 NOTE — PROGRESS NOTES
Discharge Planning Assessment  [facility]     Patient Name: Familia Duke  MRN: [id number]  Today's Date: 9/7/2019    Admit Date: 9/6/2019    Discharge Needs Assessment     Row Name 09/07/19 1019       Living Environment    Lives With  spouse    Name(s) of Who Lives With Patient  Sharonda    Current Living Arrangements  home/apartment/condo 6 small steps to enter home with handrails. Lives [place] a single story [place].    Primary Care Provided by  spouse/significant other    Provides Primary Care For  no one, unable/limited ability to care for self    Family Caregiver if Needed  spouse    Family Caregiver Names  Sharonda    Quality of Family Relationships  helpful;involved;supportive    Able to Return to Prior Arrangements  yes       Resource/Environmental Concerns    Resource/Environmental Concerns  home accessibility    Home Accessibility Concerns  stairs to enter home 6 small steps with handrail    Transportation Concerns  car, none       Transition Planning    Patient/Family Anticipates Transition to  home with family;home with help/services    Patient/Family Anticipated Services at Transition  home health care    Transportation Anticipated  family or friend will provide       Discharge Needs Assessment    Readmission Within the Last 30 Days  no previous admission in last 30 days    Concerns to be Addressed  discharge planning    Equipment Currently Used at Home  bipap/cpap    Anticipated Changes Related to Illness  none    Equipment Needed After Discharge  orthotic device back brace    Outpatient/Agency/Support Group Needs  homecare agency    Discharge Facility/Level of Care Needs  home with home health    Offered/Gave Vendor List  no spouse has used [facility] in the past and they would like to use them again for pt at [place]    Current Discharge Risk  dependent with mobility/activities of daily living;physical impairment        Discharge Plan     Row Name 09/07/19 1025       Plan    Plan  Home with spouse to assist and [facility] to  follow    Patient/Family in Agreement with Plan  yes    Plan Comments  Introduced self/explained role of CCP. Face sheet data reviewed. Pt lives at home with his spouse. Home is a single story ranch with 6 small steps to entry. Pt states there is a handrail to use. Spouse states the MD said pt was supposed to have a back brace post op. Updated staff RN and she states she will check with MD when he rounds. Spouse states pt has not had HH in the past but she used [facility] and that is who they would like to use for pt at [place]. Referral called to [facility] and in basket referral sent. Denies previous SNF stay. Spouse states she plans to drive pt home at [place]. CCP to follow............JW        Destination      No service coordination in this encounter.      Durable Medical Equipment      No service coordination in this encounter.      Dialysis/Infusion      No service coordination in this encounter.      Home Medical Care      Service Provider Request Status Selected Services Address Phone Number Fax Number    [facility] Accepted N/A [address] [address] [phone]       Sylvia Ceron, RN 9/7/2019 1015    Referral called to [facility] and they will accept and follow                 Therapy      No service coordination in this encounter.      Community Resources      No service coordination in this encounter.          Demographic Summary     Row Name 09/07/19 1016       General Information    Admission Type  other (see comments) outpatient    Arrived From  home    Referral Source  admission list    Reason for Consult  discharge planning    Preferred Language  English     Used During This Interaction  no       Contact Information    Permission Granted to Share Info With  ;family/designee        Functional Status     Row Name 09/07/19 1017       Functional Status    Usual Activity Tolerance  good    Current Activity Tolerance  poor     Functional Status Comments  post op back surgery       Functional Status, IADL    Medications  assistive person    Meal Preparation  assistive person    Housekeeping  assistive person    Laundry  assistive person    Shopping  assistive person    IADL Comments  lives at home with his spouse        Psychosocial    No documentation.       Abuse/Neglect    No documentation.       Legal    No documentation.       Substance Abuse    No documentation.       Patient Forms    No documentation.           Sylvia Ceron RN
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
## 2019-09-07 NOTE — CODE DOCUMENTATION
Pt restless, uncomfortable  Bladder scan shows greater than 1L  Dr Melendez returned stat call, will come to room 623 to place FC  Stricture cart at the bedside

## 2019-09-07 NOTE — NURSING NOTE
Nursing attempted to straight cath the patient due to 761ml bladder scan and patient was unable to void. Nursing met slight resistance while inserting catheter, then large amount of dark blood began flowing through and around catheter. Nursing was unable to enter the patient's bladder due to resistance and bleeding. Once the catheter was removed, blood began pouring out of the urethra. I would estimate between 50 and 100ml of blood came out during and immediately after cathing the patient. A clot was noted when the catheter was removed. Dr. Velasquez was paged, awaiting response. Will continue to monitor.

## 2019-09-07 NOTE — PLAN OF CARE
Problem: Patient Care Overview  Goal: Plan of Care Review  Outcome: Ongoing (interventions implemented as appropriate)   09/07/19 0959   Coping/Psychosocial   Plan of Care Reviewed With patient   OTHER   Outcome Summary Pt s/p L3/S1 decompression after worsening T/L spine pain and BLE pain. He demonstrates dec tolerance to bed mobility, transfers, and unsteadiness on his feet with few steps to sit in chair. Pt will benefit from continued skilled PT services to address mobility deficits and ensure he is safe to return home at d/c. Recommend home with HH following d/c.

## 2019-09-07 NOTE — CODE DOCUMENTATION
Called on rapid response for syncope, and vomiting.  Also bloody urine when straight cath.  BP = 144/84; HR = 77; T=  98.4; 97% on 2L.

## 2019-09-07 NOTE — PROGRESS NOTES
Responded to 'Rapid Response' call-nurse Angela LOVE stated that RT was not needed and would call if necessary.

## 2019-09-08 PROBLEM — K59.01 SLOW TRANSIT CONSTIPATION: Status: ACTIVE | Noted: 2019-09-08

## 2019-09-08 PROBLEM — R55 SYNCOPE: Status: ACTIVE | Noted: 2019-09-08

## 2019-09-08 LAB
ALBUMIN SERPL-MCNC: 3.9 G/DL (ref 3.5–5.2)
ALBUMIN/GLOB SERPL: 1.8 G/DL
ALP SERPL-CCNC: 60 U/L (ref 39–117)
ALT SERPL W P-5'-P-CCNC: 12 U/L (ref 1–41)
ANION GAP SERPL CALCULATED.3IONS-SCNC: 7.7 MMOL/L (ref 5–15)
AST SERPL-CCNC: 15 U/L (ref 1–40)
BACTERIA SPEC AEROBE CULT: NO GROWTH
BILIRUB SERPL-MCNC: 0.3 MG/DL (ref 0.2–1.2)
BUN BLD-MCNC: 14 MG/DL (ref 8–23)
BUN/CREAT SERPL: 12.2 (ref 7–25)
CALCIUM SPEC-SCNC: 8.1 MG/DL (ref 8.6–10.5)
CHLORIDE SERPL-SCNC: 107 MMOL/L (ref 98–107)
CO2 SERPL-SCNC: 28.3 MMOL/L (ref 22–29)
CREAT BLD-MCNC: 1.15 MG/DL (ref 0.76–1.27)
D-LACTATE SERPL-SCNC: 1.7 MMOL/L (ref 0.5–2)
DEPRECATED RDW RBC AUTO: 49.5 FL (ref 37–54)
ERYTHROCYTE [DISTWIDTH] IN BLOOD BY AUTOMATED COUNT: 13.9 % (ref 12.3–15.4)
GFR SERPL CREATININE-BSD FRML MDRD: 65 ML/MIN/1.73
GLOBULIN UR ELPH-MCNC: 2.2 GM/DL
GLUCOSE BLD-MCNC: 133 MG/DL (ref 65–99)
HCT VFR BLD AUTO: 33.8 % (ref 37.5–51)
HGB BLD-MCNC: 10.7 G/DL (ref 13–17.7)
MAGNESIUM SERPL-MCNC: 2.2 MG/DL (ref 1.6–2.4)
MCH RBC QN AUTO: 30.8 PG (ref 26.6–33)
MCHC RBC AUTO-ENTMCNC: 31.7 G/DL (ref 31.5–35.7)
MCV RBC AUTO: 97.4 FL (ref 79–97)
PLATELET # BLD AUTO: 144 10*3/MM3 (ref 140–450)
PMV BLD AUTO: 10.7 FL (ref 6–12)
POTASSIUM BLD-SCNC: 4 MMOL/L (ref 3.5–5.2)
PROT SERPL-MCNC: 6.1 G/DL (ref 6–8.5)
RBC # BLD AUTO: 3.47 10*6/MM3 (ref 4.14–5.8)
SODIUM BLD-SCNC: 143 MMOL/L (ref 136–145)
WBC NRBC COR # BLD: 9.73 10*3/MM3 (ref 3.4–10.8)

## 2019-09-08 PROCEDURE — G0378 HOSPITAL OBSERVATION PER HR: HCPCS

## 2019-09-08 PROCEDURE — 97116 GAIT TRAINING THERAPY: CPT

## 2019-09-08 PROCEDURE — 99024 POSTOP FOLLOW-UP VISIT: CPT | Performed by: NEUROLOGICAL SURGERY

## 2019-09-08 PROCEDURE — 25010000002 VANCOMYCIN 10 G RECONSTITUTED SOLUTION: Performed by: HOSPITALIST

## 2019-09-08 PROCEDURE — 83605 ASSAY OF LACTIC ACID: CPT | Performed by: HOSPITALIST

## 2019-09-08 PROCEDURE — 80053 COMPREHEN METABOLIC PANEL: CPT | Performed by: HOSPITALIST

## 2019-09-08 PROCEDURE — 94799 UNLISTED PULMONARY SVC/PX: CPT

## 2019-09-08 PROCEDURE — 93010 ELECTROCARDIOGRAM REPORT: CPT | Performed by: INTERNAL MEDICINE

## 2019-09-08 PROCEDURE — 85027 COMPLETE CBC AUTOMATED: CPT | Performed by: HOSPITALIST

## 2019-09-08 PROCEDURE — 99243 OFF/OP CNSLTJ NEW/EST LOW 30: CPT | Performed by: INTERNAL MEDICINE

## 2019-09-08 PROCEDURE — 93005 ELECTROCARDIOGRAM TRACING: CPT | Performed by: INTERNAL MEDICINE

## 2019-09-08 PROCEDURE — 97530 THERAPEUTIC ACTIVITIES: CPT

## 2019-09-08 PROCEDURE — 83735 ASSAY OF MAGNESIUM: CPT | Performed by: HOSPITALIST

## 2019-09-08 PROCEDURE — 25010000003 CEFTRIAXONE PER 250 MG: Performed by: HOSPITALIST

## 2019-09-08 RX ORDER — BISACODYL 10 MG
10 SUPPOSITORY, RECTAL RECTAL DAILY PRN
Status: DISCONTINUED | OUTPATIENT
Start: 2019-09-08 | End: 2019-09-10 | Stop reason: HOSPADM

## 2019-09-08 RX ORDER — SENNA AND DOCUSATE SODIUM 50; 8.6 MG/1; MG/1
2 TABLET, FILM COATED ORAL NIGHTLY
Status: DISCONTINUED | OUTPATIENT
Start: 2019-09-08 | End: 2019-09-10

## 2019-09-08 RX ADMIN — SODIUM CHLORIDE 125 ML/HR: 9 INJECTION, SOLUTION INTRAVENOUS at 01:02

## 2019-09-08 RX ADMIN — ESCITALOPRAM 20 MG: 20 TABLET, FILM COATED ORAL at 08:27

## 2019-09-08 RX ADMIN — BISACODYL 10 MG: 10 SUPPOSITORY RECTAL at 17:12

## 2019-09-08 RX ADMIN — POLYETHYLENE GLYCOL 3350 17 G: 17 POWDER, FOR SOLUTION ORAL at 12:27

## 2019-09-08 RX ADMIN — CEFTRIAXONE SODIUM 2 G: 2 INJECTION, SOLUTION INTRAVENOUS at 17:08

## 2019-09-08 RX ADMIN — PANTOPRAZOLE SODIUM 40 MG: 40 TABLET, DELAYED RELEASE ORAL at 06:56

## 2019-09-08 RX ADMIN — HYDROMORPHONE HYDROCHLORIDE: 10 INJECTION INTRAMUSCULAR; INTRAVENOUS; SUBCUTANEOUS at 12:23

## 2019-09-08 RX ADMIN — OXYCODONE HYDROCHLORIDE AND ACETAMINOPHEN 1 TABLET: 7.5; 325 TABLET ORAL at 06:14

## 2019-09-08 RX ADMIN — SODIUM CHLORIDE 125 ML/HR: 9 INJECTION, SOLUTION INTRAVENOUS at 12:28

## 2019-09-08 RX ADMIN — SENNOSIDES AND DOCUSATE SODIUM 2 TABLET: 8.6; 5 TABLET ORAL at 21:21

## 2019-09-08 RX ADMIN — VANCOMYCIN HYDROCHLORIDE 1500 MG: 10 INJECTION, POWDER, LYOPHILIZED, FOR SOLUTION INTRAVENOUS at 04:34

## 2019-09-08 RX ADMIN — OXYCODONE HYDROCHLORIDE AND ACETAMINOPHEN 1 TABLET: 7.5; 325 TABLET ORAL at 16:43

## 2019-09-08 RX ADMIN — SODIUM CHLORIDE, PRESERVATIVE FREE 3 ML: 5 INJECTION INTRAVENOUS at 08:28

## 2019-09-08 RX ADMIN — SODIUM CHLORIDE, PRESERVATIVE FREE 3 ML: 5 INJECTION INTRAVENOUS at 21:23

## 2019-09-08 RX ADMIN — DOCUSATE SODIUM 100 MG: 100 CAPSULE, LIQUID FILLED ORAL at 06:59

## 2019-09-08 RX ADMIN — OXYCODONE HYDROCHLORIDE AND ACETAMINOPHEN 1 TABLET: 7.5; 325 TABLET ORAL at 21:17

## 2019-09-08 RX ADMIN — ROSUVASTATIN CALCIUM 40 MG: 40 TABLET, FILM COATED ORAL at 08:27

## 2019-09-08 NOTE — PLAN OF CARE
Problem: Patient Care Overview  Goal: Plan of Care Review  Outcome: Ongoing (interventions implemented as appropriate)   09/08/19 1314   Coping/Psychosocial   Plan of Care Reviewed With patient;daughter   OTHER   Outcome Summary Pt ambulating around unit this date with Bigg and RW. Progressing well toward goals. Will benefit from continued skilled PT.   Plan of Care Review   Progress improving
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
## 2019-09-08 NOTE — PLAN OF CARE
Problem: Patient Care Overview  Goal: Plan of Care Review  Outcome: Ongoing (interventions implemented as appropriate)   09/08/19 7490   Coping/Psychosocial   Plan of Care Reviewed With patient   OTHER   Outcome Summary F/C in place, catheter care provided. IVF, IV abx and PCA continue. Pt resting well this shift. No drainage on DARRYL noted. VSS. Spouse at bedside. Will continue to monitor.    Plan of Care Review   Progress improving     Goal: Discharge Needs Assessment  Outcome: Ongoing (interventions implemented as appropriate)    Goal: Interprofessional Rounds/Family Conf  Outcome: Ongoing (interventions implemented as appropriate)      Problem: Fall Risk (Adult)  Goal: Absence of Fall  Outcome: Ongoing (interventions implemented as appropriate)
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
## 2019-09-08 NOTE — PROGRESS NOTES
NEUROSURGERY PROGRESS NOTE     LOS: 0 days   Patient Care Team:  Khurram Kunz MD as PCP - General (Sports Medicine)    Chief Complaint: Back pain with claudication    Subjective     Interval History:     Stop day #2 L3-S1 decompression.  Allergy had to place a De Dios catheter yesterday because of urinary retention due to prostatic hypertrophy.  He has no neurologic findings in the lower extremities such as weakness or numbness to suggest that the etiology was neurologic.  He feels better overall today but is a bit more sore in the back pain he was yesterday.    History taken from: patient    Objective      Vital Signs  Temp:  [96.6 °F (35.9 °C)-98.6 °F (37 °C)] 98.2 °F (36.8 °C)  Heart Rate:  [] 85  Resp:  [16-22] 18  BP: ()/(54-84) 102/61  Body mass index is 35.64 kg/m².    Intake/Output last 3 shifts:  I/O last 3 completed shifts:  In: 2690 [P.O.:640; I.V.:1450; IV Piggyback:600]  Out: 2350 [Urine:2350]    Intake/Output this shift:  No intake/output data recorded.    Physical    Alert and oriented x3 resting comfortably in bed with his CPAP machine on  More comfortable in the abdomen following De Dios placement  Motor normal in both lower extremities  Station normal in both lower extremities  Wound clean dry and intact    Results Review:     I reviewed the patient's new clinical results.    Labs:    Lab Results (last 24 hours)     Procedure Component Value Units Date/Time    POC Glucose Once [[id number]]  (Abnormal) Collected:  09/07/19 1252    Specimen:  Blood Updated:  09/07/19 1255     Glucose 152 mg/dL     Comprehensive Metabolic Panel [[id number]]  (Abnormal) Collected:  09/07/19 1322    Specimen:  Blood Updated:  09/07/19 1353     Glucose 136 mg/dL      BUN 16 mg/dL      Creatinine 1.20 mg/dL      Sodium 139 mmol/L      Potassium 4.7 mmol/L      Chloride 101 mmol/L      CO2 27.6 mmol/L      Calcium 9.0 mg/dL      Total Protein 7.2 g/dL      Albumin 4.30 g/dL      ALT (SGPT) 13 U/L      AST (SGOT)  "19 U/L      Alkaline Phosphatase 71 U/L      Total Bilirubin 0.4 mg/dL      eGFR Non African Amer 62 mL/min/1.73      Globulin 2.9 gm/dL      A/G Ratio 1.5 g/dL      BUN/Creatinine Ratio 13.3     Anion Gap 10.4 mmol/L     Narrative:       GFR Normal >60  Chronic Kidney Disease <60  Kidney Failure <15    CBC (No Diff) [[id number]]  (Abnormal) Collected:  09/07/19 1322    Specimen:  Blood Updated:  09/07/19 1336     WBC 15.99 10*3/mm3      RBC 4.40 10*6/mm3      Hemoglobin 13.5 g/dL      Hematocrit 41.9 %      MCV 95.2 fL      MCH 30.7 pg      MCHC 32.2 g/dL      RDW 13.8 %      RDW-SD 48.5 fl      MPV 10.8 fL      Platelets 197 10*3/mm3     Lactic Acid, Plasma [[id number]]  (Abnormal) Collected:  09/07/19 1322    Specimen:  Blood Updated:  09/07/19 1352     Lactate 2.1 mmol/L     Lactic Acid, Reflex Timer (This will reflex a repeat order 3-3:15 hours after ordered.) [[id number]] Collected:  09/07/19 1322    Specimen:  Blood Updated:  09/07/19 1700     Extra Tube Hold for add-ons.     Comment: Auto resulted.       Procalcitonin [[id number]]  (Abnormal) Collected:  09/07/19 1322    Specimen:  Blood Updated:  09/07/19 1450     Procalcitonin 0.04 ng/mL     Narrative:       As a Marker for Sepsis (Non-Neonates):   1. <0.5 ng/mL represents a low risk of severe sepsis and/or septic shock.  1. >2 ng/mL represents a high risk of severe sepsis and/or septic shock.    As a Marker for Lower Respiratory Tract Infections that require antibiotic therapy:  PCT on Admission     Antibiotic Therapy             6-12 Hrs later  > 0.5                Strongly Recommended            >0.25 - <0.5         Recommended  0.1 - 0.25           Discouraged                   Remeasure/reassess PCT  <0.1                 Strongly Discouraged          Remeasure/reassess PCT      As 28 day mortality risk marker: \"Change in Procalcitonin Result\" (> 80 % or <=80 %) if Day 0 (or Day 1) and Day 4 values are available. Refer to " http://[email or web address]-pct-calculator.com/   Change in PCT <=80 %   A decrease of PCT levels below or equal to 80 % defines a positive change in PCT test result representing a higher risk for 28-day all-cause mortality of patients diagnosed with severe sepsis or septic shock.  Change in PCT > 80 %   A decrease of PCT levels of more than 80 % defines a negative change in PCT result representing a lower risk for 28-day all-cause mortality of patients diagnosed with severe sepsis or septic shock.                POC Glucose Once [[id number]]  (Abnormal) Collected:  09/07/19 1507    Specimen:  Blood Updated:  09/07/19 1508     Glucose 166 mg/dL     Blood Culture - Blood, Arm, Left [[id number]] Collected:  09/07/19 1620    Specimen:  Blood from Arm, Left Updated:  09/07/19 1701    Blood Culture - Blood, Arm, Right [[id number]] Collected:  09/07/19 1627    Specimen:  Blood from Arm, Right Updated:  09/07/19 1701    Urinalysis With Culture If Indicated - Urine, Random Void [[id number]]  (Abnormal) Collected:  09/07/19 1706    Specimen:  Urine, Random Void Updated:  09/07/19 1729     Color, UA Yellow     Appearance, UA Clear     pH, UA 5.5     Specific Gravity, UA 1.014     Glucose, UA Negative     Ketones, UA Negative     Bilirubin, UA Negative     Blood, UA Negative     Protein, UA Negative     Leuk Esterase, UA Trace     Nitrite, UA Negative     Urobilinogen, UA 0.2 E.U./dL    Urinalysis, Microscopic Only - Urine, Random Void [[id number]]  (Abnormal) Collected:  09/07/19 1706    Specimen:  Urine, Random Void Updated:  09/07/19 1729     RBC, UA 0-2 /HPF      WBC, UA 6-12 /HPF      Bacteria, UA None Seen /HPF      Squamous Epithelial Cells, UA 0-2 /HPF      Hyaline Casts, UA 0-2 /LPF      Methodology Automated Microscopy    Urine Culture - Urine, Urine, Random Void [[id number]] Collected:  09/07/19 1706    Specimen:  Urine, Random Void Updated:  09/07/19 1729    Lactic Acid, Reflex [[id number]]  (Abnormal) Collected:  09/07/19 1707     Specimen:  Blood Updated:  09/07/19 1729     Lactate 2.5 mmol/L     Lactic Acid, Plasma [[id number]]  (Normal) Collected:  09/08/19 0008    Specimen:  Blood Updated:  09/08/19 0101     Lactate 1.7 mmol/L     CBC (No Diff) [[id number]]  (Abnormal) Collected:  09/08/19 0629    Specimen:  Blood from Arm, Left Updated:  09/08/19 0722     WBC 9.73 10*3/mm3      RBC 3.47 10*6/mm3      Hemoglobin 10.7 g/dL      Hematocrit 33.8 %      MCV 97.4 fL      MCH 30.8 pg      MCHC 31.7 g/dL      RDW 13.9 %      RDW-SD 49.5 fl      MPV 10.7 fL      Platelets 144 10*3/mm3     Comprehensive Metabolic Panel [[id number]]  (Abnormal) Collected:  09/08/19 0629    Specimen:  Blood from Arm, Left Updated:  09/08/19 0730     Glucose 133 mg/dL      BUN 14 mg/dL      Creatinine 1.15 mg/dL      Sodium 143 mmol/L      Potassium 4.0 mmol/L      Chloride 107 mmol/L      CO2 28.3 mmol/L      Calcium 8.1 mg/dL      Total Protein 6.1 g/dL      Albumin 3.90 g/dL      ALT (SGPT) 12 U/L      AST (SGOT) 15 U/L      Alkaline Phosphatase 60 U/L      Total Bilirubin 0.3 mg/dL      eGFR Non African Amer 65 mL/min/1.73      Globulin 2.2 gm/dL      A/G Ratio 1.8 g/dL      BUN/Creatinine Ratio 12.2     Anion Gap 7.7 mmol/L     Narrative:       GFR Normal >60  Chronic Kidney Disease <60  Kidney Failure <15    Magnesium [[id number]]  (Normal) Collected:  09/08/19 0629    Specimen:  Blood from Arm, Left Updated:  09/08/19 0730     Magnesium 2.2 mg/dL           Imaging:    None new    Current Medications:   Scheduled Meds:  ceftriaxone 2 g Intravenous Q24H   escitalopram 20 mg Oral Daily   pantoprazole 40 mg Oral QAM   rosuvastatin 40 mg Oral Daily   sodium chloride 3 mL Intravenous Q12H   tamsulosin 0.4 mg Oral Nightly   vancomycin 1,500 mg Intravenous Q12H     Continuous Infusions:  HYDROmorphone HCl-NaCl     Pharmacy to dose vancomycin     sodium chloride 125 mL/hr Last Rate: 125 mL/hr (09/08/19 0102)       Assessment/Plan       Spinal stenosis, lumbar region, with  neurogenic claudication    Spinal stenosis of lumbar region    Retention of urine      Plan: Ordered a brace which has not yet been delivered which Dr. Velasquez told the family he would need.  Hopefully build to mobilize a bit more today now that the bladder situation has been resolved.  PT has been ordered for today.      Garry Pfeiffer MD  09/08/19  8:37 AM

## 2019-09-08 NOTE — PROGRESS NOTES
Name: Familia Duke ADMIT: 2019   : 1960  PCP: Khurram Kunz MD    MRN: [id number] LOS: 0 days   AGE/SEX: 58 y.o. male  ROOM: [facility]   Subjective   No chief complaint on file.  cc- back pain    Pain is fair  Had syncope yesterday  Feels better today    ROS  No f/c  No n/v  No cp/palp  No soa/cough    Objective   Vital Signs  Temp:  [98 °F (36.7 °C)-98.8 °F (37.1 °C)] 98.6 °F (37 °C)  Heart Rate:  [85-97] 85  Resp:  [16-18] 16  BP: ()/(54-67) 122/67  SpO2:  [92 %-95 %] 92 %  on  Flow (L/min):  [2] 2;   Device (Oxygen Therapy): room air  Body mass index is 35.64 kg/m².    Physical Exam   Constitutional: He is oriented to person, place, and time. He appears well-developed and well-nourished. No distress.   HENT:   Head: Normocephalic and atraumatic.   Mouth/Throat: Oropharynx is clear and moist.   Eyes: Conjunctivae are normal. No scleral icterus.   Neck: Normal range of motion. Neck supple.   Cardiovascular: Normal rate, regular rhythm and normal heart sounds.   No murmur heard.  Pulmonary/Chest: Effort normal and breath sounds normal. No respiratory distress.   Abdominal: Soft. Bowel sounds are normal. There is no tenderness.   Musculoskeletal: He exhibits no edema or deformity.   Neurological: He is alert and oriented to person, place, and time.   Skin: Skin is warm and dry. He is not diaphoretic.   Psychiatric: He has a normal mood and affect. His behavior is normal. Thought content normal.   Nursing note and vitals reviewed.      Results Review:       I reviewed the patient's new clinical results.  Results from last 7 days   Lab Units 19  0629 19  1322   WBC 10*3/mm3 9.73 15.99*   HEMOGLOBIN g/dL 10.7* 13.5   PLATELETS 10*3/mm3 144 197     Results from last 7 days   Lab Units 19  0629 19  1322   SODIUM mmol/L 143 139   POTASSIUM mmol/L 4.0 4.7   CHLORIDE mmol/L 107 101   CO2 mmol/L 28.3 27.6   BUN mg/dL 14 16   CREATININE mg/dL 1.15 1.20   GLUCOSE mg/dL 133*  136*   Estimated Creatinine Clearance: 80.6 mL/min (by C-G formula based on SCr of 1.15 mg/dL).  Results from last 7 days   Lab Units 09/08/19  0629 09/07/19  1322   ALBUMIN g/dL 3.90 4.30   BILIRUBIN mg/dL 0.3 0.4   ALK PHOS U/L 60 71   AST (SGOT) U/L 15 19   ALT (SGPT) U/L 12 13     Results from last 7 days   Lab Units 09/08/19  0629 09/07/19  1322   CALCIUM mg/dL 8.1* 9.0   ALBUMIN g/dL 3.90 4.30   MAGNESIUM mg/dL 2.2  --      Results from last 7 days   Lab Units 09/08/19  0008 09/07/19  1707 09/07/19  1322   PROCALCITONIN ng/mL  --   --  0.04*   LACTATE mmol/L 1.7 2.5* 2.1*       Coag     HbA1C   Lab Results   Component Value Date    HGBA1C 6.0 (H) 11/20/2015     Infection   Results from last 7 days   Lab Units 09/07/19  1706 09/07/19  1627 09/07/19  1620 09/07/19  1322   BLOODCX   --  No growth at 24 hours No growth at 24 hours  --    URINECX  No growth  --   --   --    PROCALCITONIN ng/mL  --   --   --  0.04*     Radiology(recent) Xr Abdomen Kub    Result Date: 9/7/2019  Postoperative change from recent lumbar surgery. Normal bowel gas pattern.  This report was finalized on 9/7/2019 2:21 PM by Dr. Parker Alvarez M.D.      No results found for: TROPONINT, TROPONINI, BNP  No components found for: TSH;2      escitalopram 20 mg Oral Daily   pantoprazole 40 mg Oral QAM   polyethylene glycol 17 g Oral Daily   rosuvastatin 40 mg Oral Daily   sennosides-docusate sodium 2 tablet Oral Nightly   sodium chloride 3 mL Intravenous Q12H   tamsulosin 0.4 mg Oral Nightly       HYDROmorphone HCl-NaCl     sodium chloride 75 mL/hr Last Rate: 125 mL/hr (09/08/19 1721)   Diet Regular      Assessment/Plan      Active Hospital Problems    Diagnosis  POA   • **Spinal stenosis, lumbar region, with neurogenic claudication [M48.062]  Yes   • Slow transit constipation [K59.01]  Yes   • Syncope [R55]  Unknown   • Retention of urine [R33.9]  Yes   • Spinal stenosis of lumbar region [M48.061]  Yes   • Stage 2 chronic kidney disease [N18.2]   Yes   • Sleep apnea [G47.30]  Yes   • Gastroesophageal reflux disease [K21.9]  Yes      Resolved Hospital Problems   No resolved problems to display.       · Syncope likely vasovagal. Monitor on Telemetry, being seen by Cardiology. To get ECHO  · Post op pain control and monitoring  · No evidence of infection, stop abx  · Has catheter, seen by Urology  · Stool softeners  · Above meds     Plan to transfer to     [facility] RN, family      Alan Perez MD  [facility]  09/08/19  6:21 PM

## 2019-09-08 NOTE — PLAN OF CARE
Problem: Patient Care Overview  Goal: Plan of Care Review  Outcome: Ongoing (interventions implemented as appropriate)   09/08/19 1954   Coping/Psychosocial   Plan of Care Reviewed With patient   OTHER   Outcome Summary Pt. VS WNL. C/o pain at times, relieved by pain meds/PCA. Pt. A&O x4. POD 2 decompression. Pt. dressing clean, dry, intact, j/p with sanguinous fluid about 40-5 every 4 hours or so. Pt. ambulating well with walker and assist x1 LSO brace encouraged when out of bed. Pt. iv abx dc'd. Pt. receiving IVFs rate decreased. Pt. txing back to [facility] per family and pt. request. Pt. resting comfortably at present, will continue to monitor closely.   Plan of Care Review   Progress improving     Goal: Individualization and Mutuality  Outcome: Ongoing (interventions implemented as appropriate)    Goal: Discharge Needs Assessment  Outcome: Ongoing (interventions implemented as appropriate)      Problem: Pain, Chronic (Adult)  Goal: Acceptable Pain/Comfort Level and Functional Ability  Outcome: Ongoing (interventions implemented as appropriate)      Problem: Fall Risk (Adult)  Goal: Absence of Fall  Outcome: Ongoing (interventions implemented as appropriate)

## 2019-09-09 ENCOUNTER — APPOINTMENT (OUTPATIENT)
Dept: CARDIOLOGY | Facility: HOSPITAL | Age: 59
End: 2019-09-09

## 2019-09-09 LAB — TROPONIN T SERPL-MCNC: <0.01 NG/ML (ref 0–0.03)

## 2019-09-09 PROCEDURE — G0378 HOSPITAL OBSERVATION PER HR: HCPCS

## 2019-09-09 PROCEDURE — 94799 UNLISTED PULMONARY SVC/PX: CPT

## 2019-09-09 PROCEDURE — 93306 TTE W/DOPPLER COMPLETE: CPT

## 2019-09-09 PROCEDURE — 99213 OFFICE O/P EST LOW 20 MIN: CPT | Performed by: INTERNAL MEDICINE

## 2019-09-09 PROCEDURE — 93306 TTE W/DOPPLER COMPLETE: CPT | Performed by: INTERNAL MEDICINE

## 2019-09-09 PROCEDURE — 97110 THERAPEUTIC EXERCISES: CPT

## 2019-09-09 PROCEDURE — 99024 POSTOP FOLLOW-UP VISIT: CPT | Performed by: NURSE PRACTITIONER

## 2019-09-09 PROCEDURE — 84484 ASSAY OF TROPONIN QUANT: CPT | Performed by: INTERNAL MEDICINE

## 2019-09-09 RX ORDER — METHOCARBAMOL 500 MG/1
1000 TABLET, FILM COATED ORAL EVERY 6 HOURS SCHEDULED
Status: DISCONTINUED | OUTPATIENT
Start: 2019-09-09 | End: 2019-09-10 | Stop reason: HOSPADM

## 2019-09-09 RX ADMIN — OXYCODONE HYDROCHLORIDE AND ACETAMINOPHEN 1 TABLET: 7.5; 325 TABLET ORAL at 14:26

## 2019-09-09 RX ADMIN — ESCITALOPRAM 20 MG: 20 TABLET, FILM COATED ORAL at 09:33

## 2019-09-09 RX ADMIN — PANTOPRAZOLE SODIUM 40 MG: 40 TABLET, DELAYED RELEASE ORAL at 05:07

## 2019-09-09 RX ADMIN — OXYCODONE HYDROCHLORIDE AND ACETAMINOPHEN 1 TABLET: 7.5; 325 TABLET ORAL at 20:24

## 2019-09-09 RX ADMIN — BISACODYL 10 MG: 10 SUPPOSITORY RECTAL at 14:10

## 2019-09-09 RX ADMIN — ROSUVASTATIN CALCIUM 40 MG: 40 TABLET, FILM COATED ORAL at 09:33

## 2019-09-09 RX ADMIN — OXYCODONE HYDROCHLORIDE AND ACETAMINOPHEN 1 TABLET: 7.5; 325 TABLET ORAL at 05:07

## 2019-09-09 RX ADMIN — SENNOSIDES AND DOCUSATE SODIUM 2 TABLET: 8.6; 5 TABLET ORAL at 20:24

## 2019-09-09 RX ADMIN — OXYCODONE HYDROCHLORIDE AND ACETAMINOPHEN 1 TABLET: 7.5; 325 TABLET ORAL at 10:27

## 2019-09-09 RX ADMIN — DOCUSATE SODIUM 100 MG: 100 CAPSULE, LIQUID FILLED ORAL at 09:33

## 2019-09-09 RX ADMIN — METHOCARBAMOL TABLETS 1000 MG: 500 TABLET, COATED ORAL at 16:18

## 2019-09-09 RX ADMIN — POLYETHYLENE GLYCOL 3350 17 G: 17 POWDER, FOR SOLUTION ORAL at 09:33

## 2019-09-09 NOTE — PROGRESS NOTES
Postoperative visit    Complains of a line of back spasm pain.  Flexeril not helpful.  De Dios catheter reinserted over the weekend for urinary retention.  Appreciate urology's input.  Cardiology following for syncopal episode, tachycardia during De Dios catheter re-insertion. Transthoracic echo pending.  Denies chest pain shortness of breath.        98.7, 87, 16, 124/64; 96%        Lumbar dressing dry and intact.  DARRYL drain intact with serosanguinous drainage 25 cc.   No calf swelling or tenderness with palpation.   No motor or sensory weakness in lower extremities.       .  Results from last 7 days   Lab Units 09/08/19  0629 09/07/19  1322   WBC 10*3/mm3 9.73 15.99*   HEMOGLOBIN g/dL 10.7* 13.5   HEMATOCRIT % 33.8* 41.9   PLATELETS 10*3/mm3 144 197       .  Results from last 7 days   Lab Units 09/08/19  0629 09/07/19  1322   SODIUM mmol/L 143 139   POTASSIUM mmol/L 4.0 4.7   CHLORIDE mmol/L 107 101   CO2 mmol/L 28.3 27.6   BUN mg/dL 14 16   CREATININE mg/dL 1.15 1.20   GLUCOSE mg/dL 133* 136*   CALCIUM mg/dL 8.1* 9.0       POD 3 open L3-S1 bilateral lumbar decompression for spinal stenosis and bilateral neurogenic claudication.  Lumbar spasms  Near syncopal episode - cardiology following      Continue to mobilize. Has brace. PT following  DC PCA pump.  Add Robaxin for spasm relief  CBC in am
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
## 2019-09-09 NOTE — PROGRESS NOTES
Name: Familia Duke ADMIT: 2019   : 1960  PCP: Khurram Kunz MD    MRN: [id number] LOS: 0 days   AGE/SEX: 58 y.o. male  ROOM: [facility]   Subjective   No chief complaint on file.  cc- back pain    Pain is fair  Partially improved with meds    ROS  No f/c  No n/v  No cp/palp  No soa/cough    Objective   Vital Signs  Temp:  [98.6 °F (37 °C)-99.2 °F (37.3 °C)] 98.7 °F (37.1 °C)  Heart Rate:  [75-87] 87  Resp:  [16] 16  BP: (120-124)/(64-74) 124/64  SpO2:  [91 %-96 %] 96 %  on   ;   Device (Oxygen Therapy): room air  Body mass index is 36.65 kg/m².    Physical Exam   Constitutional: He is oriented to person, place, and time. He appears well-developed and well-nourished. No distress.   HENT:   Head: Normocephalic and atraumatic.   Mouth/Throat: Oropharynx is clear and moist.   Eyes: Conjunctivae are normal. No scleral icterus.   Neck: Normal range of motion. Neck supple.   Cardiovascular: Normal rate, regular rhythm and normal heart sounds.   No murmur heard.  Pulmonary/Chest: Effort normal and breath sounds normal. No respiratory distress.   Abdominal: Soft. Bowel sounds are normal. There is no tenderness.   Musculoskeletal: He exhibits no edema or deformity.   Neurological: He is alert and oriented to person, place, and time.   Skin: Skin is warm and dry. He is not diaphoretic.   Psychiatric: He has a normal mood and affect. His behavior is normal. Thought content normal.   Nursing note and vitals reviewed.      Results Review:       I reviewed the patient's new clinical results.  Results from last 7 days   Lab Units 19  0629 19  1322   WBC 10*3/mm3 9.73 15.99*   HEMOGLOBIN g/dL 10.7* 13.5   PLATELETS 10*3/mm3 144 197     Results from last 7 days   Lab Units 19  0629 19  1322   SODIUM mmol/L 143 139   POTASSIUM mmol/L 4.0 4.7   CHLORIDE mmol/L 107 101   CO2 mmol/L 28.3 27.6   BUN mg/dL 14 16   CREATININE mg/dL 1.15 1.20   GLUCOSE mg/dL 133* 136*   Estimated Creatinine  Clearance: 79.3 mL/min (by C-G formula based on SCr of 1.15 mg/dL).  Results from last 7 days   Lab Units 09/08/19  0629 09/07/19  1322   ALBUMIN g/dL 3.90 4.30   BILIRUBIN mg/dL 0.3 0.4   ALK PHOS U/L 60 71   AST (SGOT) U/L 15 19   ALT (SGPT) U/L 12 13     Results from last 7 days   Lab Units 09/08/19  0629 09/07/19  1322   CALCIUM mg/dL 8.1* 9.0   ALBUMIN g/dL 3.90 4.30   MAGNESIUM mg/dL 2.2  --      Results from last 7 days   Lab Units 09/08/19  0008 09/07/19  1707 09/07/19  1322   PROCALCITONIN ng/mL  --   --  0.04*   LACTATE mmol/L 1.7 2.5* 2.1*       Coag     HbA1C   Lab Results   Component Value Date    HGBA1C 6.0 (H) 11/20/2015     Infection   Results from last 7 days   Lab Units 09/07/19  1706 09/07/19  1627 09/07/19  1620 09/07/19  1322   BLOODCX   --  No growth at 24 hours No growth at 24 hours  --    URINECX  No growth  --   --   --    PROCALCITONIN ng/mL  --   --   --  0.04*     Radiology(recent) Xr Abdomen Kub    Result Date: 9/7/2019  Postoperative change from recent lumbar surgery. Normal bowel gas pattern.  This report was finalized on 9/7/2019 2:21 PM by Dr. Parker Alvarez M.D.      Lab Results   Component Value Date    TROPONINT <0.010 09/09/2019     No components found for: TSH;2      escitalopram 20 mg Oral Daily   pantoprazole 40 mg Oral QAM   polyethylene glycol 17 g Oral Daily   rosuvastatin 40 mg Oral Daily   sennosides-docusate sodium 2 tablet Oral Nightly   tamsulosin 0.4 mg Oral Nightly       HYDROmorphone HCl-NaCl     sodium chloride 75 mL/hr Last Rate: 75 mL/hr (09/09/19 0749)   Diet Regular      Assessment/Plan      Active Hospital Problems    Diagnosis  POA   • **Spinal stenosis, lumbar region, with neurogenic claudication [M48.062]  Yes   • Slow transit constipation [K59.01]  Yes   • Syncope [R55]  Unknown   • Retention of urine [R33.9]  Yes   • Spinal stenosis of lumbar region [M48.061]  Yes   • Stage 2 chronic kidney disease [N18.2]  Yes   • Sleep apnea [G47.30]  Yes   •  Gastroesophageal reflux disease [K21.9]  Yes      Resolved Hospital Problems   No resolved problems to display.       · Syncope likely vasovagal. Monitor on Telemetry, being seen by Cardiology.  ECHO today  · Post op pain control and monitoring  · No evidence of infection, stopped abx  · Has catheter, seen by Urology  · Stool softeners  · Above meds         AYUSH RN, family      Alan Perez MD  [facility]  09/09/19  6:21 PM

## 2019-09-09 NOTE — PLAN OF CARE
Problem: Patient Care Overview  Goal: Plan of Care Review  Outcome: Ongoing (interventions implemented as appropriate)   09/09/19 1737   Coping/Psychosocial   Plan of Care Reviewed With patient   OTHER   Outcome Summary Pt. up with assistance, alert and oriented, vss, no bowel movement but moving plenty of flatus, no issues noted, will continue to monitor. PCA DC'd.   Plan of Care Review   Progress improving       Problem: Pain, Chronic (Adult)  Goal: Acceptable Pain/Comfort Level and Functional Ability  Outcome: Ongoing (interventions implemented as appropriate)   09/09/19 1737   Pain, Chronic (Adult)   Acceptable Pain/Comfort Level and Functional Ability making progress toward outcome       Problem: Fall Risk (Adult)  Goal: Absence of Fall  Outcome: Ongoing (interventions implemented as appropriate)   09/09/19 1737   Fall Risk (Adult)   Absence of Fall making progress toward outcome       Problem: Surgery Nonspecified (Adult)  Goal: Signs and Symptoms of Listed Potential Problems Will be Absent, Minimized or Managed (Surgery Nonspecified)  Outcome: Ongoing (interventions implemented as appropriate)   09/09/19 1737   Goal/Outcome Evaluation   Problems Assessed (Surgery) all   Problems Present (Surgery) pain     Goal: Anesthesia/Sedation Recovery  Outcome: Outcome(s) achieved Date Met: 09/09/19 09/09/19 1737   Goal/Outcome Evaluation   Anesthesia/Sedation Recovery recovered to baseline

## 2019-09-09 NOTE — PLAN OF CARE
Problem: Patient Care Overview  Goal: Plan of Care Review   09/09/19 4044   Coping/Psychosocial   Plan of Care Reviewed With patient   OTHER   Outcome Summary SLow progress towards goals during PT. Able to ambulate 100' w/ min A using RW and trialing w/o. More independent/safe using RW, pt and spouse aware. Reviewed activity/walking recom, PT POC and spinal precautions. Anticipate conitnued progress for DC to home w/ home health PT, and use of RW.

## 2019-09-10 VITALS
RESPIRATION RATE: 18 BRPM | HEART RATE: 83 BPM | TEMPERATURE: 98.9 F | OXYGEN SATURATION: 94 % | SYSTOLIC BLOOD PRESSURE: 142 MMHG | BODY MASS INDEX: 36.73 KG/M2 | HEIGHT: 67 IN | DIASTOLIC BLOOD PRESSURE: 77 MMHG | WEIGHT: 234 LBS

## 2019-09-10 LAB
AORTIC DIMENSIONLESS INDEX: 0.7 (DI)
BH CV ECHO MEAS - ACS: 2.3 CM
BH CV ECHO MEAS - AO MAX PG: 7 MMHG
BH CV ECHO MEAS - AO MEAN PG (FULL): 2 MMHG
BH CV ECHO MEAS - AO MEAN PG: 4 MMHG
BH CV ECHO MEAS - AO ROOT AREA (BSA CORRECTED): 1.4
BH CV ECHO MEAS - AO ROOT AREA: 7.1 CM^2
BH CV ECHO MEAS - AO ROOT DIAM: 3 CM
BH CV ECHO MEAS - AO V2 MAX: 132.3 CM/SEC
BH CV ECHO MEAS - AO V2 MEAN: 92.8 CM/SEC
BH CV ECHO MEAS - AO V2 VTI: 29 CM
BH CV ECHO MEAS - ASC AORTA: 2.3 CM
BH CV ECHO MEAS - AVA(I,A): 2.1 CM^2
BH CV ECHO MEAS - AVA(I,D): 2.1 CM^2
BH CV ECHO MEAS - BSA(HAYCOCK): 2.3 M^2
BH CV ECHO MEAS - BSA: 2.2 M^2
BH CV ECHO MEAS - BZI_BMI: 36.6 KILOGRAMS/M^2
BH CV ECHO MEAS - BZI_METRIC_HEIGHT: 170.2 CM
BH CV ECHO MEAS - BZI_METRIC_WEIGHT: 106.1 KG
BH CV ECHO MEAS - EDV(CUBED): 97.3 ML
BH CV ECHO MEAS - EDV(MOD-SP2): 100 ML
BH CV ECHO MEAS - EDV(MOD-SP4): 99 ML
BH CV ECHO MEAS - EDV(TEICH): 97.3 ML
BH CV ECHO MEAS - EF(CUBED): 72.3 %
BH CV ECHO MEAS - EF(MOD-BP): 65 %
BH CV ECHO MEAS - EF(MOD-SP2): 66 %
BH CV ECHO MEAS - EF(MOD-SP4): 62.6 %
BH CV ECHO MEAS - EF(TEICH): 64 %
BH CV ECHO MEAS - ESV(CUBED): 27 ML
BH CV ECHO MEAS - ESV(MOD-SP2): 34 ML
BH CV ECHO MEAS - ESV(MOD-SP4): 37 ML
BH CV ECHO MEAS - ESV(TEICH): 35 ML
BH CV ECHO MEAS - FS: 34.8 %
BH CV ECHO MEAS - IVS/LVPW: 1.2
BH CV ECHO MEAS - IVSD: 1.2 CM
BH CV ECHO MEAS - LAT PEAK E' VEL: 10.4 CM/SEC
BH CV ECHO MEAS - LV DIASTOLIC VOL/BSA (35-75): 45.8 ML/M^2
BH CV ECHO MEAS - LV MASS(C)D: 181.2 GRAMS
BH CV ECHO MEAS - LV MASS(C)DI: 83.8 GRAMS/M^2
BH CV ECHO MEAS - LV MEAN PG: 2 MMHG
BH CV ECHO MEAS - LV SYSTOLIC VOL/BSA (12-30): 17.1 ML/M^2
BH CV ECHO MEAS - LV V1 MAX: 98.7 CM/SEC
BH CV ECHO MEAS - LV V1 MEAN: 61.2 CM/SEC
BH CV ECHO MEAS - LV V1 VTI: 19.2 CM
BH CV ECHO MEAS - LVIDD: 4.6 CM
BH CV ECHO MEAS - LVIDS: 3 CM
BH CV ECHO MEAS - LVLD AP2: 9 CM
BH CV ECHO MEAS - LVLD AP4: 9.9 CM
BH CV ECHO MEAS - LVLS AP2: 7.7 CM
BH CV ECHO MEAS - LVLS AP4: 8.1 CM
BH CV ECHO MEAS - LVOT AREA (M): 3.1 CM^2
BH CV ECHO MEAS - LVOT AREA: 3.1 CM^2
BH CV ECHO MEAS - LVOT DIAM: 2 CM
BH CV ECHO MEAS - LVPWD: 1 CM
BH CV ECHO MEAS - MED PEAK E' VEL: 5.69 CM/SEC
BH CV ECHO MEAS - MV A DUR: 0.14 SEC
BH CV ECHO MEAS - MV A MAX VEL: 63.6 CM/SEC
BH CV ECHO MEAS - MV DEC SLOPE: 458 CM/SEC^2
BH CV ECHO MEAS - MV DEC TIME: 116 SEC
BH CV ECHO MEAS - MV E MAX VEL: 84.1 CM/SEC
BH CV ECHO MEAS - MV E/A: 1.3
BH CV ECHO MEAS - MV MEAN PG: 2 MMHG
BH CV ECHO MEAS - MV P1/2T MAX VEL: 109 CM/SEC
BH CV ECHO MEAS - MV P1/2T: 69.7 MSEC
BH CV ECHO MEAS - MV V2 MEAN: 63.2 CM/SEC
BH CV ECHO MEAS - MV V2 VTI: 23.2 CM
BH CV ECHO MEAS - MVA P1/2T LCG: 2 CM^2
BH CV ECHO MEAS - MVA(P1/2T): 3.2 CM^2
BH CV ECHO MEAS - MVA(VTI): 2.6 CM^2
BH CV ECHO MEAS - PA ACC SLOPE: 962 CM/SEC^2
BH CV ECHO MEAS - PA ACC TIME: 0.09 SEC
BH CV ECHO MEAS - PA MAX PG (FULL): 1.2 MMHG
BH CV ECHO MEAS - PA MAX PG: 3.2 MMHG
BH CV ECHO MEAS - PA PR(ACCEL): 37.6 MMHG
BH CV ECHO MEAS - PA V2 MAX: 88.9 CM/SEC
BH CV ECHO MEAS - PULM A REVS DUR: 0.09 SEC
BH CV ECHO MEAS - PULM A REVS VEL: 25.5 CM/SEC
BH CV ECHO MEAS - PULM DIAS VEL: 35.7 CM/SEC
BH CV ECHO MEAS - PULM S/D: 1.5
BH CV ECHO MEAS - PULM SYS VEL: 54.4 CM/SEC
BH CV ECHO MEAS - PVA(V,A): 2.5 CM^2
BH CV ECHO MEAS - PVA(V,D): 2.5 CM^2
BH CV ECHO MEAS - QP/QS: 1
BH CV ECHO MEAS - RAP SYSTOLE: 3 MMHG
BH CV ECHO MEAS - RV MAX PG: 2 MMHG
BH CV ECHO MEAS - RV MEAN PG: 1 MMHG
BH CV ECHO MEAS - RV V1 MAX: 70.5 CM/SEC
BH CV ECHO MEAS - RV V1 MEAN: 54.5 CM/SEC
BH CV ECHO MEAS - RV V1 VTI: 19.5 CM
BH CV ECHO MEAS - RVOT AREA: 3.1 CM^2
BH CV ECHO MEAS - RVOT DIAM: 2 CM
BH CV ECHO MEAS - SI(AO): 94.8 ML/M^2
BH CV ECHO MEAS - SI(CUBED): 32.5 ML/M^2
BH CV ECHO MEAS - SI(LVOT): 27.9 ML/M^2
BH CV ECHO MEAS - SI(MOD-SP2): 30.5 ML/M^2
BH CV ECHO MEAS - SI(MOD-SP4): 28.7 ML/M^2
BH CV ECHO MEAS - SI(TEICH): 28.8 ML/M^2
BH CV ECHO MEAS - SV(AO): 205 ML
BH CV ECHO MEAS - SV(CUBED): 70.3 ML
BH CV ECHO MEAS - SV(LVOT): 60.3 ML
BH CV ECHO MEAS - SV(MOD-SP2): 66 ML
BH CV ECHO MEAS - SV(MOD-SP4): 62 ML
BH CV ECHO MEAS - SV(RVOT): 61.3 ML
BH CV ECHO MEAS - SV(TEICH): 62.3 ML
BH CV ECHO MEAS - TAPSE (>1.6): 2.7 CM2
BH CV ECHO MEASUREMENTS AVERAGE E/E' RATIO: 10.45
BH CV VAS BP RIGHT ARM: NORMAL MMHG
BH CV XLRA - RV BASE: 3.1 CM
BH CV XLRA - TDI S': 10.5 CM/SEC
DEPRECATED RDW RBC AUTO: 47.8 FL (ref 37–54)
ERYTHROCYTE [DISTWIDTH] IN BLOOD BY AUTOMATED COUNT: 13.8 % (ref 12.3–15.4)
HCT VFR BLD AUTO: 33.2 % (ref 37.5–51)
HGB BLD-MCNC: 10.5 G/DL (ref 13–17.7)
LEFT ATRIUM VOLUME INDEX: 18.2 ML/M2
MCH RBC QN AUTO: 30 PG (ref 26.6–33)
MCHC RBC AUTO-ENTMCNC: 31.6 G/DL (ref 31.5–35.7)
MCV RBC AUTO: 94.9 FL (ref 79–97)
PLATELET # BLD AUTO: 137 10*3/MM3 (ref 140–450)
PMV BLD AUTO: 10.8 FL (ref 6–12)
RBC # BLD AUTO: 3.5 10*6/MM3 (ref 4.14–5.8)
TROPONIN T SERPL-MCNC: <0.01 NG/ML (ref 0–0.03)
WBC NRBC COR # BLD: 6.9 10*3/MM3 (ref 3.4–10.8)

## 2019-09-10 PROCEDURE — 93005 ELECTROCARDIOGRAM TRACING: CPT | Performed by: NURSE PRACTITIONER

## 2019-09-10 PROCEDURE — 84484 ASSAY OF TROPONIN QUANT: CPT | Performed by: NURSE PRACTITIONER

## 2019-09-10 PROCEDURE — 93010 ELECTROCARDIOGRAM REPORT: CPT | Performed by: INTERNAL MEDICINE

## 2019-09-10 PROCEDURE — G0378 HOSPITAL OBSERVATION PER HR: HCPCS

## 2019-09-10 PROCEDURE — 99213 OFFICE O/P EST LOW 20 MIN: CPT | Performed by: NURSE PRACTITIONER

## 2019-09-10 PROCEDURE — 85027 COMPLETE CBC AUTOMATED: CPT | Performed by: NURSE PRACTITIONER

## 2019-09-10 RX ORDER — METHOCARBAMOL 500 MG/1
1000 TABLET, FILM COATED ORAL EVERY 6 HOURS SCHEDULED
Qty: 6 TABLET | Refills: 0 | Status: SHIPPED | OUTPATIENT
Start: 2019-09-10 | End: 2019-09-11

## 2019-09-10 RX ORDER — SENNA AND DOCUSATE SODIUM 50; 8.6 MG/1; MG/1
2 TABLET, FILM COATED ORAL 2 TIMES DAILY
Status: DISCONTINUED | OUTPATIENT
Start: 2019-09-10 | End: 2019-09-10 | Stop reason: HOSPADM

## 2019-09-10 RX ORDER — OXYCODONE AND ACETAMINOPHEN 7.5; 325 MG/1; MG/1
1 TABLET ORAL EVERY 4 HOURS PRN
Qty: 40 TABLET | Refills: 0
Start: 2019-09-10 | End: 2019-09-16

## 2019-09-10 RX ADMIN — SENNOSIDES AND DOCUSATE SODIUM 2 TABLET: 8.6; 5 TABLET ORAL at 10:55

## 2019-09-10 RX ADMIN — METHOCARBAMOL TABLETS 1000 MG: 500 TABLET, COATED ORAL at 07:02

## 2019-09-10 RX ADMIN — PANTOPRAZOLE SODIUM 40 MG: 40 TABLET, DELAYED RELEASE ORAL at 08:16

## 2019-09-10 RX ADMIN — DOCUSATE SODIUM 100 MG: 100 CAPSULE, LIQUID FILLED ORAL at 08:16

## 2019-09-10 RX ADMIN — ESCITALOPRAM 20 MG: 20 TABLET, FILM COATED ORAL at 08:15

## 2019-09-10 RX ADMIN — ROSUVASTATIN CALCIUM 40 MG: 40 TABLET, FILM COATED ORAL at 08:15

## 2019-09-10 RX ADMIN — METHOCARBAMOL TABLETS 1000 MG: 500 TABLET, COATED ORAL at 00:59

## 2019-09-10 RX ADMIN — METHOCARBAMOL TABLETS 1000 MG: 500 TABLET, COATED ORAL at 13:04

## 2019-09-10 RX ADMIN — OXYCODONE HYDROCHLORIDE AND ACETAMINOPHEN 1 TABLET: 7.5; 325 TABLET ORAL at 16:01

## 2019-09-10 RX ADMIN — OXYCODONE HYDROCHLORIDE AND ACETAMINOPHEN 1 TABLET: 7.5; 325 TABLET ORAL at 09:01

## 2019-09-10 RX ADMIN — POLYETHYLENE GLYCOL 3350 17 G: 17 POWDER, FOR SOLUTION ORAL at 08:15

## 2019-09-10 RX ADMIN — OXYCODONE HYDROCHLORIDE AND ACETAMINOPHEN 1 TABLET: 7.5; 325 TABLET ORAL at 04:26
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
## 2019-09-10 NOTE — DISCHARGE INSTRUCTIONS
LUMBAR SURGERY - KIRTI HURD M.D.  [address], [address]  [place] [address]  [phone]    Instructions & Care After Your Lumbar Surgery    1. No sitting over 30 minutes at a time. You may lie in any position that is comfortable.     2. No driving.  You may ride in the car in a passenger seat that reclines or lying down in the back seat.      3. No bending. If you drop something allow someone else to pick it up.    4. Don't lift anything heavier than 5 lbs.    5. Gradually increase your activity each day.  You should get out of bed every hour during the day.  Walk outside as soon as you feel up to it.  Walk short distances frequently rather than making a long trip.  Frequency is more important than distance.  Your goal is to be walking 2 to 3 miles per   day when you return for your post-operative visit. (Never do this in one trip.)    6. You may climb stairs.    7. Clean incisions with peroxide TID and cover with loose dry dressing. Allow air to get to back. If you notice any redness, swelling or drainage, call the office.        8. You may shower, gently pat incisions dry.     9. Physical Therapy will be arranged at your post-operative visit if needed.    10. Your prescription for pain medication may be filled for half the original amount prior to your return office visit.  Due to changes in Federal Law in order to have this medication refilled you must contact the office four days prior to the date and make arrangements to pick the prescription up   in the office.  This prescription refill cannot be called in to the pharmacy. Your prescription will be ready for pick-up the day the refill is due.    Don't be alarmed if you experience some of your pre-operative symptoms after going home.  This is not uncommon and normally goes away in a few days but may last longer.  If you have any questions or concerns, please call our office.

## 2019-09-10 NOTE — PLAN OF CARE
Problem: Patient Care Overview  Goal: Plan of Care Review  Outcome: Ongoing (interventions implemented as appropriate)   09/09/19 1737 09/09/19 2024 09/10/19 0516   Coping/Psychosocial   Plan of Care Reviewed With --  patient;spouse --    OTHER   Outcome Summary --  --  Pt A&O, VSS. UWA. F/C in place. Family at bedside. Ozzy drain in place, minimal serous sanguineous drainage in bulb. Pain well controlled with medication and ice packs. No s/s of distress and no acute changes overinight. Will continue to monitor.   Plan of Care Review   Progress improving --  --        Problem: Pain, Chronic (Adult)  Goal: Acceptable Pain/Comfort Level and Functional Ability  Outcome: Ongoing (interventions implemented as appropriate)      Problem: Fall Risk (Adult)  Goal: Absence of Fall  Outcome: Ongoing (interventions implemented as appropriate)      Problem: Surgery Nonspecified (Adult)  Goal: Signs and Symptoms of Listed Potential Problems Will be Absent, Minimized or Managed (Surgery Nonspecified)  Outcome: Ongoing (interventions implemented as appropriate)

## 2019-09-10 NOTE — DISCHARGE SUMMARY
Familia Duke  1960    Patient Care Team:  Khurram Kunz MD as PCP - General (Sports Medicine)    Date of Admit: 9/6/2019    Date of Discharge:  9/10/2019    Discharge Diagnosis:  Spinal stenosis, lumbar region, with neurogenic claudication    Gastroesophageal reflux disease    Sleep apnea    Stage 2 chronic kidney disease    Spinal stenosis of lumbar region    Retention of urine    Slow transit constipation    Syncope      Procedures Performed  Procedure(s):  LUMBAR DECOMPRESSION, open bilateral lumbar decompression L3 to S1  09/07 1646 Insert Temp Indwelling Blad Cath, Simple    Complications: None    Consultants:   Consults     Date and Time Order Name Status Description    9/7/2019 1403 Inpatient Cardiology Consult Completed     9/7/2019 1318 Inpatient Hospitalist Consult Completed     9/7/2019 1318 Inpatient Urology Consult            Condition on Discharge: stable    Discharge disposition: home      Brief HPI: Patient evaluated in office for complaints of back pain with neurogenic claudication. Imaging revealed severe lumbar stenosis L3-S1. RBAs of treatment were discussed including the above procedure. Patient consented to above procedure.  He also has a history of a thoracic vertebral fracture and chronic thoracic back pain.    Hospital Course: Patient admitted for above procedure. The procedure itself was without complication. The patient was transferred to [facility] following recovery.  He did have urinary retention following surgery which is not uncommon for him.  A De Dios catheter had to be replaced by urology on 9/7/2019.  The nurse just called urology and was told to remove it and call back by 6 PM if the patient had not voided.    Discharge Physical Exam:   Alert and oriented x3  Sensation intact  Incision clean dry and intact  Moves all extremities  No calf tenderness  Lung effort normal    Temp:  [98.2 °F (36.8 °C)-99.1 °F (37.3 °C)] 98.9 °F (37.2 °C)  Heart Rate:  [60-82] 82  Resp:  [16-18]  18  BP: (115-131)/(68-76) 131/76    Current labs:  Lab Results (last 24 hours)     Procedure Component Value Units Date/Time    CBC (No Diff) [[id number]]  (Abnormal) Collected:  09/10/19 0501    Specimen:  Blood Updated:  09/10/19 0605     WBC 6.90 10*3/mm3      RBC 3.50 10*6/mm3      Hemoglobin 10.5 g/dL      Hematocrit 33.2 %      MCV 94.9 fL      MCH 30.0 pg      MCHC 31.6 g/dL      RDW 13.8 %      RDW-SD 47.8 fl      MPV 10.8 fL      Platelets 137 10*3/mm3     Troponin [[id number]]  (Normal) Collected:  09/10/19 0501    Specimen:  Blood Updated:  09/10/19 0619     Troponin T <0.010 ng/mL     Narrative:       Troponin T Reference Range:  <= 0.03 ng/mL-   Negative for AMI  >0.03 ng/mL-     Abnormal for myocardial necrosis.  Clinicians would have to utilize clinical acumen, EKG, Troponin and serial changes to determine if it is an Acute Myocardial Infarction or myocardial injury due to an underlying chronic condition.             Discharge Medications  Kole has been reviewed and narcotic consent is on file in the patient's chart.     Your medication list      ASK your doctor about these medications      Instructions Last Dose Given Next Dose Due   acetaminophen 325 MG tablet  Commonly known as:  TYLENOL      Take 2 tablets by mouth Every 6 (Six) Hours As Needed for Mild Pain  or Fever.       Co Q 10 10 MG capsule      Take 1 capsule by mouth Daily.       CRESTOR 40 MG tablet  Generic drug:  rosuvastatin      Take 40 mg by mouth Daily.       escitalopram 20 MG tablet  Commonly known as:  LEXAPRO      Take 1 tablet by mouth Daily.       ibuprofen 200 MG tablet  Commonly known as:  ADVIL,MOTRIN      Take 400 mg by mouth Every 6 (Six) Hours As Needed for Mild Pain .       omeprazole 20 MG capsule  Commonly known as:  priLOSEC      Take 20 mg by mouth Daily.       tamsulosin 0.4 MG capsule 24 hr capsule  Commonly known as:  FLOMAX      Take 1 capsule by mouth Every Night.              Discharge Diet:     Diet Order  "  Procedures   • Diet Regular       Activity at Discharge:       Call for: questions or concerns    Follow-up Appointments  Future Appointments   Date Time Provider Department Center   9/20/2019  9:30 AM Charlotte Lam, APRN [facility] None           Test Results Pending at Discharge   Order Current Status    Blood Culture - Blood, Arm, Left Preliminary result    Blood Culture - Blood, Arm, Right Preliminary result         None    I discussed the discharge instructions with patient and family    SOCRATES Peralta  09/10/19  12:21 PM      \"Dictated utilizing Dragon dictation\".    "

## 2019-09-10 NOTE — PROGRESS NOTES
Name: Familia Duke ADMIT: 2019   : 1960  PCP: Khurram Kunz MD    MRN: [id number] LOS: 0 days   AGE/SEX: 58 y.o. male  ROOM: [facility]   Subjective   No chief complaint on file.  cc- back pain    Pain is fair  Partially improved with meds  No BM yet. On stool softeners. Tolerating po    ROS  No f/c  No n/v  No cp/palp  No soa/cough    Objective   Vital Signs  Temp:  [98.2 °F (36.8 °C)-99.1 °F (37.3 °C)] 98.9 °F (37.2 °C)  Heart Rate:  [60-82] 82  Resp:  [16-18] 18  BP: (115-131)/(68-76) 131/76  SpO2:  [91 %-95 %] 94 %  on   ;   Device (Oxygen Therapy): room air  Body mass index is 36.65 kg/m².    Physical Exam   Constitutional: He is oriented to person, place, and time. He appears well-developed and well-nourished. No distress.   HENT:   Head: Normocephalic and atraumatic.   Mouth/Throat: Oropharynx is clear and moist.   Eyes: Conjunctivae are normal. No scleral icterus.   Neck: Normal range of motion. Neck supple.   Cardiovascular: Normal rate, regular rhythm and normal heart sounds.   No murmur heard.  Pulmonary/Chest: Effort normal and breath sounds normal. No respiratory distress.   Abdominal: Soft. Bowel sounds are normal. There is no tenderness.   Musculoskeletal: He exhibits no edema or deformity.   Neurological: He is alert and oriented to person, place, and time.   Skin: Skin is warm and dry. He is not diaphoretic.   Psychiatric: He has a normal mood and affect. His behavior is normal. Thought content normal.   Nursing note and vitals reviewed.      Results Review:       I reviewed the patient's new clinical results.  Results from last 7 days   Lab Units 09/10/19  0501 19  0629 19  1322   WBC 10*3/mm3 6.90 9.73 15.99*   HEMOGLOBIN g/dL 10.5* 10.7* 13.5   PLATELETS 10*3/mm3 137* 144 197     Results from last 7 days   Lab Units 19  0629 19  1322   SODIUM mmol/L 143 139   POTASSIUM mmol/L 4.0 4.7   CHLORIDE mmol/L 107 101   CO2 mmol/L 28.3 27.6   BUN mg/dL 14 16    CREATININE mg/dL 1.15 1.20   GLUCOSE mg/dL 133* 136*   Estimated Creatinine Clearance: 79.3 mL/min (by C-G formula based on SCr of 1.15 mg/dL).  Results from last 7 days   Lab Units 09/08/19  0629 09/07/19  1322   ALBUMIN g/dL 3.90 4.30   BILIRUBIN mg/dL 0.3 0.4   ALK PHOS U/L 60 71   AST (SGOT) U/L 15 19   ALT (SGPT) U/L 12 13     Results from last 7 days   Lab Units 09/08/19  0629 09/07/19  1322   CALCIUM mg/dL 8.1* 9.0   ALBUMIN g/dL 3.90 4.30   MAGNESIUM mg/dL 2.2  --      Results from last 7 days   Lab Units 09/08/19  0008 09/07/19  1707 09/07/19  1322   PROCALCITONIN ng/mL  --   --  0.04*   LACTATE mmol/L 1.7 2.5* 2.1*       Coag     HbA1C   Lab Results   Component Value Date    HGBA1C 6.0 (H) 11/20/2015     Infection   Results from last 7 days   Lab Units 09/07/19  1706 09/07/19  1627 09/07/19  1620 09/07/19  1322   BLOODCX   --  No growth at 2 days No growth at 2 days  --    URINECX  No growth  --   --   --    PROCALCITONIN ng/mL  --   --   --  0.04*     Radiology(recent) No radiology results for the last day  Lab Results   Component Value Date    TROPONINT <0.010 09/10/2019     No components found for: TSH;2      escitalopram 20 mg Oral Daily   methocarbamol 1,000 mg Oral Q6H   pantoprazole 40 mg Oral QAM   polyethylene glycol 17 g Oral BID   rosuvastatin 40 mg Oral Daily   sennosides-docusate sodium 2 tablet Oral BID   tamsulosin 0.4 mg Oral Nightly      Diet Regular      Assessment/Plan      Active Hospital Problems    Diagnosis  POA   • **Spinal stenosis, lumbar region, with neurogenic claudication [M48.062]  Yes   • Slow transit constipation [K59.01]  Yes   • Syncope [R55]  Unknown   • Retention of urine [R33.9]  Yes   • Spinal stenosis of lumbar region [M48.061]  Yes   • Stage 2 chronic kidney disease [N18.2]  Yes   • Sleep apnea [G47.30]  Yes   • Gastroesophageal reflux disease [K21.9]  Yes      Resolved Hospital Problems   No resolved problems to display.       · Syncope likely vasovagal. Monitor  on Telemetry, being seen by Cardiology.  ECHO pending  · Post op pain control and monitoring  · No evidence of infection, stopped abx  · Has catheter, seen by Urology  · Stool softeners- will increase.   · Above meds         AYUSH RN, family      Alan Perez MD  [facility]  09/10/19  6:21 PM

## 2019-09-11 ENCOUNTER — READMISSION MANAGEMENT (OUTPATIENT)
Dept: CALL CENTER | Facility: HOSPITAL | Age: 59
End: 2019-09-11

## 2019-09-11 ENCOUNTER — TELEPHONE (OUTPATIENT)
Dept: NEUROSURGERY | Facility: CLINIC | Age: 59
End: 2019-09-11

## 2019-09-11 NOTE — TELEPHONE ENCOUNTER
Spoke with patient's wife and went over wound care with her and informed her that he does not need to be on blood thinners.  He was not on them prior to surgery , in the hospital nor did he have a DVT

## 2019-09-11 NOTE — OUTREACH NOTE
Prep Survey      Responses   Facility patient discharged from?  [facility]   Is patient eligible?  Yes   Discharge diagnosis  open bilateral lumbar decompression   Does the patient have one of the following disease processes/diagnoses(primary or secondary)?  General Surgery   Does the patient have Home health ordered?  Yes   What is the Home health agency?   [facility]    Is there a DME ordered?  No   Prep survey completed?  Yes          Liliya Hodges RN

## 2019-09-11 NOTE — TELEPHONE ENCOUNTER
The therapist called and wanted to know about the hydrogen peroxide and cleaning the wound.  Is the patient is suppose to be on blood thinners.

## 2019-09-11 NOTE — PROGRESS NOTES
Case Management Discharge Note    Final Note: Discharged home with [facility].  left with [facility].         Transportation Services  Private: Car    Final Discharge Disposition Code: 06 - home with home health care

## 2019-09-12 ENCOUNTER — READMISSION MANAGEMENT (OUTPATIENT)
Dept: CALL CENTER | Facility: HOSPITAL | Age: 59
End: 2019-09-12

## 2019-09-12 LAB
BACTERIA SPEC AEROBE CULT: NORMAL
BACTERIA SPEC AEROBE CULT: NORMAL

## 2019-09-12 NOTE — PROGRESS NOTES
Subjective   Patient ID: Familia Duke is a 58 y.o. male is here today for follow-up. Mr. Duke is 2 weeks out from open bilateral lumbar decompression L3 to S1 on 09/06 by Dr. Torres. Patient reports that he is doing well. The incision looks clean and dry. No redness, swelling or drainage. Patient denies having fever. On a scale of 0-10, the patient rates his pain a 0.5.     Mr. Tracy returns to the office for 2-week postoperative evaluation following open L3-S1 lumbar decompression surgery.  He contacted the office last week for complaints of worsening posterior thigh pain and calf pain.  He was started on a steroid taper.  He is feeling better.        Review of Systems   Respiratory: Negative for chest tightness and shortness of breath.    Cardiovascular: Negative for chest pain.   Musculoskeletal: Positive for arthralgias and back pain.   All other systems reviewed and are negative.      Objective   Physical Exam   Constitutional: He appears well-developed. No distress.   Neurological:   No motor or sensory deficits on exam.   Skin: Skin is warm and dry.   Lumbar incision is well approximated. No redness, swelling or drainage.    Psychiatric: He has a normal mood and affect.   Vitals reviewed.    Neurologic Exam    Assessment/Plan     Medical Decision Making:      Mr. Duke is doing quite well although he was having some severe lumbar neuritis a few days ago.  The pain is now better controlled with the steroid taper.  I explained to the patient and his wife that it will take time for the nerve symptoms to improve.  For that reason, I recommended trying gabapentin to help with the symptoms more long-term.  He will start with 300 mg at night for 4 to 5 days and then gradually increase up to 3 times daily.  He will call the office if he experiences any worsening pain symptoms.  Walking was encouraged.  I will see him back in the office in 3 weeks.      Familia was seen today for post-op.    Diagnoses and  all orders for this visit:    Surgical followup visit    Other orders  -     gabapentin (NEURONTIN) 300 MG capsule; Take 1 capsule by mouth 3 (Three) Times a Day.      Return in about 3 weeks (around 10/11/2019).

## 2019-09-12 NOTE — OUTREACH NOTE
General Surgery Week 1 Survey      Responses   Facility patient discharged from?  [facility]   Does the patient have one of the following disease processes/diagnoses(primary or secondary)?  General Surgery   Is there a successful TCM telephone encounter documented?  No   Week 1 attempt successful?  No   Unsuccessful attempts  Attempt 1          Millicent Portillo RN

## 2019-09-13 ENCOUNTER — READMISSION MANAGEMENT (OUTPATIENT)
Dept: CALL CENTER | Facility: HOSPITAL | Age: 59
End: 2019-09-13

## 2019-09-13 NOTE — OUTREACH NOTE
General Surgery Week 1 Survey      Responses   Facility patient discharged from?  [facility]   Does the patient have one of the following disease processes/diagnoses(primary or secondary)?  General Surgery   Is there a successful TCM telephone encounter documented?  No   Week 1 attempt successful?  Yes   Call start time  1509   Call end time  1512   Discharge diagnosis  open bilateral lumbar decompression   Is patient permission given to speak with other caregiver?  No   Meds reviewed with patient/caregiver?  Yes   Is the patient having any side effects they believe may be caused by any medication additions or changes?  No   Does the patient have all medications related to this admission filled (includes all antibiotics, pain medications, etc.)  Yes   Is the patient taking all medications as directed (includes completed medication regime)?  Yes   Does the patient have a follow up appointment scheduled with their surgeon?  Yes [Friday Sep 20, 2019 9:30 AM ]   Has the patient kept scheduled appointments due by today?  N/A   What is the Home health agency?   [facility]    Has home health visited the patient within 72 hours of discharge?  Yes   Psychosocial issues?  No   Did the patient receive a copy of their discharge instructions?  Yes   Nursing interventions  Reviewed instructions with patient   What is the patient's perception of their health status since discharge?  Improving   Nursing interventions  Nurse provided patient education   Is the patient /caregiver able to teach back basic post-op care?  Lifting as instructed by MD in discharge instructions, Do not remove steri-strips, Keep incision areas clean,dry and protected, No tub bath, swimming, or hot tub until instructed by MD, Drive as instructed by MD in discharge instructions, Practice 'cough and deep breath', Continue use of incentive spirometry at least 1 week post discharge   Is the patient/caregiver able to teach back signs and symptoms of incisional  infection?  Increased redness, swelling or pain at the incisonal site, Increased drainage or bleeding, Incisional warmth, Pus or odor from incision, Fever   Is the patient/caregiver able to teach back steps to recovery at home?  Set small, achievable goals for return to baseline health, Rest and rebuild strength, gradually increase activity   Is the patient/caregiver able to teach back the hierarchy of who to call/visit for symptoms/problems? PCP, Specialist, Home health nurse, Urgent Care, ED, 911  Yes   Week 1 call completed?  Yes          Tere Eddy RN

## 2019-09-16 ENCOUNTER — TELEPHONE (OUTPATIENT)
Dept: NEUROSURGERY | Facility: CLINIC | Age: 59
End: 2019-09-16

## 2019-09-16 DIAGNOSIS — M48.062 SPINAL STENOSIS, LUMBAR REGION, WITH NEUROGENIC CLAUDICATION: Primary | Chronic | ICD-10-CM

## 2019-09-16 RX ORDER — METHYLPREDNISOLONE 4 MG/1
TABLET ORAL
Qty: 21 TABLET | Refills: 0 | Status: SHIPPED | OUTPATIENT
Start: 2019-09-16 | End: 2019-10-11

## 2019-09-16 RX ORDER — CYCLOBENZAPRINE HCL 10 MG
10 TABLET ORAL 3 TIMES DAILY PRN
Qty: 90 TABLET | Refills: 0 | Status: SHIPPED | OUTPATIENT
Start: 2019-09-16 | End: 2019-10-11

## 2019-09-16 NOTE — TELEPHONE ENCOUNTER
Spoke to pt he would like to try MDP and Cyclobenzaprine. Sent to [facility].

## 2019-09-16 NOTE — TELEPHONE ENCOUNTER
Likely just from residual nerve inflammation. Can try MDP. Can also just give it more time. We can certainly refill flexeril if he would like, 1 q 8 prn. #90

## 2019-09-16 NOTE — TELEPHONE ENCOUNTER
Pt and [facility] called on speaker phone. Pt is having radiating bilateral calf pain and numbness since last Thursday. No redness, warmth or swelling. He is taking Oxycodone 7.5/325 BID for pain he said he was only prescribed 6 Cyclobenzaprine.

## 2019-09-20 ENCOUNTER — READMISSION MANAGEMENT (OUTPATIENT)
Dept: CALL CENTER | Facility: HOSPITAL | Age: 59
End: 2019-09-20

## 2019-09-20 ENCOUNTER — OFFICE VISIT (OUTPATIENT)
Dept: NEUROSURGERY | Facility: CLINIC | Age: 59
End: 2019-09-20

## 2019-09-20 VITALS — WEIGHT: 234 LBS | BODY MASS INDEX: 36.65 KG/M2

## 2019-09-20 DIAGNOSIS — Z09 SURGICAL FOLLOWUP VISIT: Primary | ICD-10-CM

## 2019-09-20 PROCEDURE — 99024 POSTOP FOLLOW-UP VISIT: CPT | Performed by: NURSE PRACTITIONER

## 2019-09-20 RX ORDER — GABAPENTIN 300 MG/1
300 CAPSULE ORAL 3 TIMES DAILY
Qty: 90 CAPSULE | Refills: 2 | Status: SHIPPED | OUTPATIENT
Start: 2019-09-20 | End: 2020-01-25

## 2019-09-20 NOTE — OUTREACH NOTE
General Surgery Week 2 Survey      Responses   Facility patient discharged from?  [facility]   Does the patient have one of the following disease processes/diagnoses(primary or secondary)?  General Surgery   Week 2 attempt successful?  Yes   Call start time  1658   Call end time  1659   Discharge diagnosis  open bilateral lumbar decompression   Meds reviewed with patient/caregiver?  Yes   Is the patient having any side effects they believe may be caused by any medication additions or changes?  No   Does the patient have all medications related to this admission filled (includes all antibiotics, pain medications, etc.)  Yes   Is the patient taking all medications as directed (includes completed medication regime)?  Yes   Does the patient have a follow up appointment scheduled with their surgeon?  Yes   Has the patient kept scheduled appointments due by today?  Yes   What is the Home health agency?   [facility]   Has home health visited the patient within 72 hours of discharge?  N/A   Home health comments  HH has stopped    Psychosocial issues?  No   Did the patient receive a copy of their discharge instructions?  Yes   Nursing interventions  Reviewed instructions with patient   What is the patient's perception of their health status since discharge?  Improving   Nursing interventions  Nurse provided patient education   Is the patient /caregiver able to teach back basic post-op care?  Lifting as instructed by MD in discharge instructions, Do not remove steri-strips, Keep incision areas clean,dry and protected, No tub bath, swimming, or hot tub until instructed by MD, Drive as instructed by MD in discharge instructions, Practice 'cough and deep breath', Continue use of incentive spirometry at least 1 week post discharge   Is the patient/caregiver able to teach back signs and symptoms of incisional infection?  Increased redness, swelling or pain at the incisonal site, Increased drainage or bleeding, Incisional warmth,  Pus or odor from incision, Fever   Is the patient/caregiver able to teach back steps to recovery at home?  Set small, achievable goals for return to baseline health, Rest and rebuild strength, gradually increase activity   Is the patient/caregiver able to teach back the hierarchy of who to call/visit for symptoms/problems? PCP, Specialist, Home health nurse, Urgent Care, ED, 911  Yes   Week 2 call completed?  Yes          Marcio Mcneil RN

## 2019-09-24 ENCOUNTER — TELEPHONE (OUTPATIENT)
Dept: NEUROSURGERY | Facility: CLINIC | Age: 59
End: 2019-09-24

## 2019-09-24 NOTE — TELEPHONE ENCOUNTER
He completed MDP last night and started Gabapentin 300 mg 3 days ago along with taking the MDP.  He is now up to taking Gabapentin BID and will take another one tonight.      He woke up this morning with bilateral leg pain R>L n/t. He denies any redness or swelling.  He would like to know your opinion on options?

## 2019-09-25 NOTE — TELEPHONE ENCOUNTER
I reassured patient that its is the nerves healing and repairing themselves and to give it some time.
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
## 2019-10-09 DIAGNOSIS — N18.2 STAGE 2 CHRONIC KIDNEY DISEASE: Chronic | ICD-10-CM

## 2019-10-09 DIAGNOSIS — E78.5 HYPERLIPIDEMIA, UNSPECIFIED HYPERLIPIDEMIA TYPE: ICD-10-CM

## 2019-10-11 ENCOUNTER — OFFICE VISIT (OUTPATIENT)
Dept: NEUROSURGERY | Facility: CLINIC | Age: 59
End: 2019-10-11

## 2019-10-11 VITALS — SYSTOLIC BLOOD PRESSURE: 129 MMHG | HEART RATE: 81 BPM | DIASTOLIC BLOOD PRESSURE: 72 MMHG

## 2019-10-11 DIAGNOSIS — Z09 SURGICAL FOLLOWUP VISIT: Primary | ICD-10-CM

## 2019-10-11 LAB
BUN SERPL-MCNC: 16 MG/DL (ref 8–23)
BUN/CREAT SERPL: 14.3 (ref 7–25)
CALCIUM SERPL-MCNC: 9.8 MG/DL (ref 8.6–10.5)
CHLORIDE SERPL-SCNC: 102 MMOL/L (ref 98–107)
CHOLEST SERPL-MCNC: 142 MG/DL (ref 0–200)
CHOLEST/HDLC SERPL: 3.55 {RATIO}
CO2 SERPL-SCNC: 29 MMOL/L (ref 22–29)
CREAT SERPL-MCNC: 1.12 MG/DL (ref 0.76–1.27)
GLUCOSE SERPL-MCNC: 116 MG/DL (ref 65–99)
HDLC SERPL-MCNC: 40 MG/DL (ref 40–60)
LDLC SERPL CALC-MCNC: 75 MG/DL (ref 0–100)
POTASSIUM SERPL-SCNC: 4.6 MMOL/L (ref 3.5–5.2)
SODIUM SERPL-SCNC: 143 MMOL/L (ref 136–145)
TRIGL SERPL-MCNC: 137 MG/DL (ref 0–150)
VLDLC SERPL CALC-MCNC: 27.4 MG/DL

## 2019-10-11 PROCEDURE — 99024 POSTOP FOLLOW-UP VISIT: CPT | Performed by: NURSE PRACTITIONER

## 2019-10-18 ENCOUNTER — OFFICE VISIT (OUTPATIENT)
Dept: SPORTS MEDICINE | Facility: CLINIC | Age: 59
End: 2019-10-18

## 2019-10-18 VITALS
BODY MASS INDEX: 36.73 KG/M2 | OXYGEN SATURATION: 98 % | WEIGHT: 234 LBS | HEIGHT: 67 IN | SYSTOLIC BLOOD PRESSURE: 124 MMHG | DIASTOLIC BLOOD PRESSURE: 70 MMHG | HEART RATE: 72 BPM

## 2019-10-18 DIAGNOSIS — R73.09 ELEVATED GLUCOSE LEVEL: Primary | ICD-10-CM

## 2019-10-18 DIAGNOSIS — N18.2 STAGE 2 CHRONIC KIDNEY DISEASE: Chronic | ICD-10-CM

## 2019-10-18 DIAGNOSIS — Z98.890 STATUS POST LUMBAR SPINE SURGERY FOR DECOMPRESSION OF SPINAL CORD: ICD-10-CM

## 2019-10-18 DIAGNOSIS — E78.2 MIXED HYPERLIPIDEMIA: ICD-10-CM

## 2019-10-18 PROCEDURE — 99214 OFFICE O/P EST MOD 30 MIN: CPT | Performed by: FAMILY MEDICINE

## 2019-10-18 RX ORDER — ROSUVASTATIN CALCIUM 40 MG/1
40 TABLET, COATED ORAL DAILY
Qty: 90 TABLET | Refills: 3 | Status: SHIPPED | OUTPATIENT
Start: 2019-10-18 | End: 2021-01-07

## 2019-10-18 RX ORDER — ESCITALOPRAM OXALATE 20 MG/1
20 TABLET ORAL DAILY
Qty: 90 TABLET | Refills: 3 | Status: SHIPPED | OUTPATIENT
Start: 2019-10-18 | End: 2021-01-07

## 2019-10-18 NOTE — PROGRESS NOTES
"Familia is a 58 y.o. year old male follow up on a problem familiar to this examiner.     Chief Complaint   Patient presents with   • Hyperlipidemia   • kidney function       History of Present Illness   HPI   1. F/u HLD - eating healthier, exercising (getting back now after back surgery) - reviewed improved triglycerides on labs last week  2. Diagnosed CKD2 while in hospital this summer - concerned about that.   3. Recovering well from back surgery with Dr. Velasquez - stopped gabapentin yesterday, feeling well.  4. Urinary retention while in hospital - flomax helped but notes some urethral stinging sensation - has f/u with uro Dr. Hodges, continuing flomax for now.    I have reviewed the patient's medical, family, and social history in detail and updated the computerized patient record.    Review of Systems   Constitutional: Negative.    Respiratory: Negative.    Cardiovascular: Negative.        /70   Pulse 72   Ht 170.2 cm (67.01\")   Wt 106 kg (234 lb)   SpO2 98%   BMI 36.64 kg/m²      Physical Exam   Constitutional: He is oriented to person, place, and time. He appears well-developed and well-nourished.   HENT:   Head: Normocephalic and atraumatic.   Eyes: Conjunctivae are normal. Pupils are equal, round, and reactive to light.   Pulmonary/Chest: Effort normal.   Neurological: He is alert and oriented to person, place, and time.   Psychiatric: He has a normal mood and affect. His behavior is normal.   Nursing note and vitals reviewed.        Current Outpatient Medications:   •  acetaminophen (TYLENOL) 325 MG tablet, Take 2 tablets by mouth Every 6 (Six) Hours As Needed for Mild Pain  or Fever., Disp: , Rfl:   •  Coenzyme Q10 (CO Q 10) 10 MG capsule, Take 1 capsule by mouth Daily., Disp: , Rfl:   •  escitalopram (LEXAPRO) 20 MG tablet, Take 1 tablet by mouth Daily., Disp: 90 tablet, Rfl: 3  •  gabapentin (NEURONTIN) 300 MG capsule, Take 1 capsule by mouth 3 (Three) Times a Day., Disp: 90 capsule, Rfl: " 2  •  omeprazole (priLOSEC) 20 MG capsule, Take 20 mg by mouth Daily., Disp: , Rfl:   •  rosuvastatin (CRESTOR) 40 MG tablet, Take 1 tablet by mouth Daily., Disp: 90 tablet, Rfl: 3  •  tamsulosin (FLOMAX) 0.4 MG capsule 24 hr capsule, Take 1 capsule by mouth Every Night., Disp: 30 capsule, Rfl:      Diagnoses and all orders for this visit:    Elevated glucose level  -     Hemoglobin A1c    Mixed hyperlipidemia    Stage 2 chronic kidney disease    Status post lumbar spine surgery for decompression of spinal cord    Other orders  -     rosuvastatin (CRESTOR) 40 MG tablet; Take 1 tablet by mouth Daily.  -     escitalopram (LEXAPRO) 20 MG tablet; Take 1 tablet by mouth Daily.    Recovering very well from surgery.  Reviewed his labs.  Cholesterol is excellent.  His kidney function technically is consistent with stage II kidney disease, but is actually better than it was a few years ago and has been very stable.  We discussed interpreting this as increased risk for future problems with his kidneys.  He will continue appropriate risk factor modification and judicious use of NSAIDs.  He rarely uses omeprazole.  We will follow-up on his elevated glucose for possible prediabetes.        Melboss Dragon/Transcription disclaimer:    Much of this encounter note is an electronic transcription/translation of spoken language to printed text.  The electronic translation of spoken language may permit erroneous, or at times, nonsensical words or phrases to be inadvertently transcribed.  Although I have reviewed the note for such errors some may still exist.

## 2019-10-19 LAB — HBA1C MFR BLD: 5.8 % (ref 4.8–5.6)

## 2019-10-21 ENCOUNTER — TELEPHONE (OUTPATIENT)
Dept: SPORTS MEDICINE | Facility: CLINIC | Age: 59
End: 2019-10-21
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
## 2019-11-22 ENCOUNTER — OFFICE VISIT (OUTPATIENT)
Dept: NEUROSURGERY | Facility: CLINIC | Age: 59
End: 2019-11-22

## 2019-11-22 VITALS
SYSTOLIC BLOOD PRESSURE: 139 MMHG | DIASTOLIC BLOOD PRESSURE: 83 MMHG | HEIGHT: 67 IN | BODY MASS INDEX: 36.73 KG/M2 | HEART RATE: 90 BPM | WEIGHT: 234 LBS

## 2019-11-22 DIAGNOSIS — Z09 SURGICAL FOLLOWUP VISIT: Primary | ICD-10-CM

## 2019-11-22 DIAGNOSIS — M62.838 MUSCLE SPASM: ICD-10-CM

## 2019-11-22 DIAGNOSIS — M54.2 PAIN OF CERVICAL SPINE: ICD-10-CM

## 2019-11-22 DIAGNOSIS — M54.6 PAIN IN THORACIC SPINE: ICD-10-CM

## 2019-11-22 PROCEDURE — 99024 POSTOP FOLLOW-UP VISIT: CPT | Performed by: NURSE PRACTITIONER

## 2019-11-22 RX ORDER — DEXAMETHASONE 1.5 MG/1
TABLET ORAL
Qty: 51 TABLET | Refills: 0 | Status: SHIPPED | OUTPATIENT
Start: 2019-11-22 | End: 2020-01-25

## 2019-11-22 RX ORDER — BACLOFEN 10 MG/1
10 TABLET ORAL 2 TIMES DAILY
Qty: 60 TABLET | Refills: 2 | Status: SHIPPED | OUTPATIENT
Start: 2019-11-22 | End: 2020-01-25

## 2019-11-26 ENCOUNTER — TELEPHONE (OUTPATIENT)
Dept: NEUROSURGERY | Facility: CLINIC | Age: 59
End: 2019-11-26

## 2019-11-26 NOTE — TELEPHONE ENCOUNTER
----- Message from Adelina Roger sent at 11/26/2019  9:11 AM EST -----  Regarding: rx  [place] [phone] dexamethasone michael is not available, pharmacist Dory, wants to know if Charlotte wants to use a different michael or give the dexamethasone in individual tablets and type out the instructions?

## 2019-12-16 NOTE — PROGRESS NOTES
"Subjective   History of Present Illness: Familia Duke is a 59 y.o. male is here today for follow-up. Mr. Duke is over 3 months out from having a L3-S1 open bilateral lumbar decompression by Dr. Velasquez 9/6/19. He was last in the office on 11/22/19 with complaints of thoracic back pain. Today, Mr. Duke reports lower cervical and upper back pain. He said it is getting to the point where its constant.     History of Present Illness     Mr. Duke returns to the office after a trial of steroids and muscle relaxers.  He reports some relief in his cervical thoracic pain with the steroid medication but once he completed the taper, his symptoms soon returned.  He did not notice any relief in symptoms with baclofen.   complains of worsening cervical thoracic pain with radiation across the right scapular and just below the right scapular region as well as pain in the left mid to lower thoracic region.  The pain in this region radiates to the left of the thoracic spine.  He describes severe episodes of pain and a constant need to want to crack his back. He cannot get comfortable or straighten up without severe pain.  He denies any radiating leg pain although he does have some intermittent episodes of pain in the right lower axilla region.  He has tried facet injections with no relief.      The following portions of the patient's history were reviewed and updated as appropriate: allergies, current medications, past family history, past medical history, past social history, past surgical history and problem list.    Review of Systems   Respiratory: Negative for chest tightness and shortness of breath.    All other systems reviewed and are negative.      Objective     Vitals:    12/30/19 0841   BP: 109/67   Pulse: 79   Weight: 106 kg (234 lb)   Height: 170.2 cm (67.01\")     Body mass index is 36.64 kg/m².      Physical Exam   Constitutional: He is oriented to person, place, and time. He appears well-developed " and well-nourished. He is cooperative. No distress.   HENT:   Head: Normocephalic and atraumatic.   Eyes: Conjunctivae are normal. Right eye exhibits no discharge. Left eye exhibits no discharge.   Neck: Normal range of motion. Neck supple. No tracheal deviation present.   Cardiovascular: Normal rate and intact distal pulses.   Pulmonary/Chest: Effort normal. No respiratory distress.   Abdominal: Soft. He exhibits no distension. There is no tenderness.   Musculoskeletal: Normal range of motion. He exhibits tenderness and deformity. He exhibits no edema.   There is evidence of developing thoracic kyphosis.  The patient is not tender to palpation within the cervical spine but is tender in the cervical thoracic as well as the upper and mid thoracic regions.   Neurological: He is alert and oriented to person, place, and time. He has normal reflexes. He displays normal reflexes. No sensory deficit. He exhibits normal muscle tone. Coordination normal. GCS eye subscore is 4. GCS verbal subscore is 5. GCS motor subscore is 6.   No motor or sensory deficits. DTR's normal. Negative Whipple's; negative clonus. SLR and Jovon's negative bilaterally.  Able to bear weight on heels and toes bilaterally.     Skin: Skin is warm and dry. He is not diaphoretic. No erythema.   Psychiatric: He has a normal mood and affect. Thought content normal.   Vitals reviewed.    Neurologic Exam     Mental Status   Oriented to person, place, and time.         Assessment/Plan   Independent Review of Radiographic Studies:      I personally reviewed the images from the cervical and thoracic MRI performed July 4, 2019.  There are multilevel degenerative changes in the cervical spine greatest at C7-T1 where there is a moderate sized disc protrusion contributing to some mild cord flattening.  There were old compression deformities at T9, T10, and T11.  Multilevel disc bulges noted in the upper to mid thoracic region as well as T9-10 and T10-11.  Was no  evidence of abnormal cord signal within the cervical or thoracic spine.      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 He will see Dr. Velasquez after myelogram is completed.    I have discussed the myelogram procedure at length with the patient. The risks of the procedure were explained.  I explained that there is an approximate 50% chance of developing a bad headache which is usually positional.  There may also be some increased back pain at the insertion site for a 1-2 days after the procedure. For this reason I recommended avoidance of any strenuous activity, lying down as much as possible, drinking plenty of fluids, even caffeinated beverages for a couple of days following the myelogram. There is also a small chance of infection, bleeding, or  seizures. Should the patient develop a positional headache, I recommended calling the office for further instructions. If symptoms do not improve with steroids and rest as stated above, the patient may require a blood patch. Should the patient develop any other untoward symptoms from the myelogram, a call to our office was recommended. The patient verbalized understanding of these risks and asked to proceed.       Return for after radiographic imaging, Dr. Velasquez.

## 2019-12-30 ENCOUNTER — OFFICE VISIT (OUTPATIENT)
Dept: NEUROSURGERY | Facility: CLINIC | Age: 59
End: 2019-12-30

## 2019-12-30 VITALS
BODY MASS INDEX: 36.73 KG/M2 | HEART RATE: 79 BPM | DIASTOLIC BLOOD PRESSURE: 67 MMHG | WEIGHT: 234 LBS | HEIGHT: 67 IN | SYSTOLIC BLOOD PRESSURE: 109 MMHG

## 2019-12-30 DIAGNOSIS — M54.13 RADICULOPATHY OF CERVICOTHORACIC REGION: Primary | ICD-10-CM

## 2019-12-30 DIAGNOSIS — M50.23: ICD-10-CM

## 2019-12-30 DIAGNOSIS — M54.14 THORACIC RADICULITIS: ICD-10-CM

## 2019-12-30 PROCEDURE — 99214 OFFICE O/P EST MOD 30 MIN: CPT | Performed by: NURSE PRACTITIONER

## 2020-01-25 RX ORDER — SULFAMETHOXAZOLE AND TRIMETHOPRIM 800; 160 MG/1; MG/1
1 TABLET ORAL 2 TIMES DAILY
COMMUNITY
End: 2020-02-21

## 2020-02-03 ENCOUNTER — HOSPITAL ENCOUNTER (OUTPATIENT)
Dept: CT IMAGING | Facility: HOSPITAL | Age: 60
Discharge: HOME | End: 2020-02-03
Admitting: NURSE PRACTITIONER

## 2020-02-03 ENCOUNTER — HOSPITAL ENCOUNTER (OUTPATIENT)
Dept: GENERAL RADIOLOGY | Facility: HOSPITAL | Age: 60
Discharge: HOME | End: 2020-02-03

## 2020-02-03 VITALS
DIASTOLIC BLOOD PRESSURE: 68 MMHG | OXYGEN SATURATION: 96 % | HEIGHT: 68 IN | TEMPERATURE: 97.1 F | SYSTOLIC BLOOD PRESSURE: 119 MMHG | BODY MASS INDEX: 34.86 KG/M2 | WEIGHT: 230 LBS | RESPIRATION RATE: 18 BRPM | HEART RATE: 66 BPM

## 2020-02-03 DIAGNOSIS — M54.14 THORACIC RADICULITIS: ICD-10-CM

## 2020-02-03 DIAGNOSIS — M54.13 RADICULOPATHY OF CERVICOTHORACIC REGION: ICD-10-CM

## 2020-02-03 DIAGNOSIS — M50.23: ICD-10-CM

## 2020-02-03 PROCEDURE — 72126 CT NECK SPINE W/DYE: CPT

## 2020-02-03 PROCEDURE — 62305 MYELOGRAPHY LUMBAR INJECTION: CPT

## 2020-02-03 PROCEDURE — 72129 CT CHEST SPINE W/DYE: CPT

## 2020-02-03 PROCEDURE — 25010000002 IOPAMIDOL 61 % SOLUTION: Performed by: RADIOLOGY

## 2020-02-03 PROCEDURE — 25010000003 LIDOCAINE 1 % SOLUTION: Performed by: RADIOLOGY

## 2020-02-03 PROCEDURE — 72240 MYELOGRAPHY NECK SPINE: CPT

## 2020-02-03 PROCEDURE — 72052 X-RAY EXAM NECK SPINE 6/>VWS: CPT

## 2020-02-03 PROCEDURE — 25010000002 KETOROLAC TROMETHAMINE PER 15 MG: Performed by: RADIOLOGY

## 2020-02-03 RX ORDER — KETOROLAC TROMETHAMINE 30 MG/ML
60 INJECTION, SOLUTION INTRAMUSCULAR; INTRAVENOUS ONCE
Status: COMPLETED | OUTPATIENT
Start: 2020-02-03 | End: 2020-02-03

## 2020-02-03 RX ORDER — HYDROCODONE BITARTRATE AND ACETAMINOPHEN 5; 325 MG/1; MG/1
1 TABLET ORAL ONCE
Status: COMPLETED | OUTPATIENT
Start: 2020-02-03 | End: 2020-02-03

## 2020-02-03 RX ORDER — HYDROCODONE BITARTRATE AND ACETAMINOPHEN 5; 325 MG/1; MG/1
1 TABLET ORAL ONCE AS NEEDED
Status: COMPLETED | OUTPATIENT
Start: 2020-02-03 | End: 2020-02-03

## 2020-02-03 RX ORDER — LIDOCAINE HYDROCHLORIDE 10 MG/ML
10 INJECTION, SOLUTION INFILTRATION; PERINEURAL ONCE
Status: COMPLETED | OUTPATIENT
Start: 2020-02-03 | End: 2020-02-03

## 2020-02-03 RX ADMIN — IOPAMIDOL 12 ML: 612 INJECTION, SOLUTION INTRATHECAL at 08:13

## 2020-02-03 RX ADMIN — HYDROCODONE BITARTRATE AND ACETAMINOPHEN 1 TABLET: 5; 325 TABLET ORAL at 07:06

## 2020-02-03 RX ADMIN — KETOROLAC TROMETHAMINE 60 MG: 30 INJECTION, SOLUTION INTRAMUSCULAR at 09:07

## 2020-02-03 RX ADMIN — LIDOCAINE HYDROCHLORIDE 5 ML: 10 INJECTION, SOLUTION INFILTRATION; PERINEURAL at 08:10

## 2020-02-03 RX ADMIN — HYDROCODONE BITARTRATE AND ACETAMINOPHEN 1 TABLET: 5; 325 TABLET ORAL at 07:43

## 2020-02-03 NOTE — H&P
Name: Familia Duke ADMIT: 2/3/2020   : 1960  PCP: Khurram Kunz MD    MRN: [id number] LOS: 0 days   AGE/SEX: 59 y.o. male  ROOM: Room/bed info not found       Chief complaint   Patient is a 59 y.o. male presents with neck pain.     Past Surgical History:  Past Surgical History:   Procedure Laterality Date   • BACK SURGERY N/A     Dr. Walter, L4-L5   • COLONOSCOPY N/A     normal colonoscopy   • COLONOSCOPY N/A 10/31/2018    Procedure: COLONOSCOPY  TO CECUM/TI;  Surgeon: Yomi Wiley MD;  Location: [facility] ENDOSCOPY;  Service: General   • FOOT OSTEOTOMY W/ PLANTAR FASCIA RELEASE Left 2014    PLANTAR PLATE REPAIR 2ND, 3RD, & 4TH DIGITS, EXTENSOR TENDON LENGTHING 2ND, 3RD & 4TH DIGITS, WEIL OSTEOTOMY 2ND, 3RD & 4TH DIGITS-Dr. Azael Schilling, DP    • FOOT SURGERY Left    • KNEE SURGERY Bilateral ,     scope   • LUMBAR LAMINECTOMY DISCECTOMY DECOMPRESSION Bilateral 2019    Procedure: LUMBAR DECOMPRESSION, open bilateral lumbar decompression L3 to S1;  Surgeon: Brandon Velasquez MD;  Location: [facility] MAIN OR;  Service: Neurosurgery   • SHOULDER ARTHROSCOPY W/ ACROMIAL REPAIR Right 2015    Shoulder arthroscopy with SLAP and rotator cuff debridement, subacromial decompression with acromioplasty, DCE and mini open biceps tenodesis, one Arthrex 6.25 mm Biocomposite Swivelock Tenodesis Screw-Dr. Ten Bryant    • SHOULDER ARTHROSCOPY W/ ACROMIAL REPAIR Left 2017    Shoulder arthroscopy with SLAP/labral/rotator cuff debridement, subacromial decompression with acromioplasty, DCE, chondroplasty and biceps tenodesis, one Arthrex 6.25 mm Biocomposite Swivelock Tenodesis Screw-Dr. Ten Bryant    • SHOULDER DEBRIDEMENT Left 2017    Scar revision of a 3.5 centimeter incision with irrigation and debridement, which was an excisional debridement of subcutaneous tissue and a small area of the muscle-Dr. Ten Bryant    • UPPER GASTROINTESTINAL ENDOSCOPY N/A        Past  Medical History:  Past Medical History:   Diagnosis Date   • Anesthesia complication     following block for shoulder surgery the numbing med affected lungs and he had to spend night for observation till he could breath again   • Anxiety    • Arthritis    • BPH (benign prostatic hyperplasia)    • Chronic back pain    • GERD (gastroesophageal reflux disease)    • Hemorrhoid    • History of compression fracture of spine     thoracic area   • Hyperlipidemia    • Injury of plantar plate of left foot 12/04/2014    Plantar plate rupture had surgery   • Migraine    • Sleep apnea     CPAP   • Spinal stenosis        Home Medications:    (Not in a hospital admission)    Allergies:  Patient has no known allergies.    Family History:  Family History   Problem Relation Age of Onset   • Pancreatic cancer Mother    • Cancer Father         Sarcoma   • Breast cancer Sister    • Lymphoma Brother    • Other Brother 53        accident   • No Known Problems Maternal Grandmother    • No Known Problems Maternal Grandfather    • No Known Problems Paternal Grandmother    • No Known Problems Paternal Grandfather    • Malig Hyperthermia Neg Hx        Social History:  Social History     Tobacco Use   • Smoking status: Never Smoker   • Smokeless tobacco: Never Used   Substance Use Topics   • Alcohol use: No   • Drug use: No        Objective     Physical Exam:   OK for Myelogram    Vital Signs  Temp:  [97.1 °F (36.2 °C)] 97.1 °F (36.2 °C)  Heart Rate:  [67] 67  Resp:  [18] 18  BP: (145)/(80) 145/80    Anticipated Surgical Procedure:  C&T Myelogram    The risks, benefits and alternatives of this procedure have been discussed with the patient or responsible party: Yes        Yomi Fernandes MD  02/03/20  7:44 AM

## 2020-02-03 NOTE — NURSING NOTE
Pt assisted to private vehicle via wheelchair to leave with his spouse. NAD noted. Pt reports his pain is much better.

## 2020-02-03 NOTE — DISCHARGE INSTRUCTIONS
EDUCATION /DISCHARGE INSTRUCTIONS:  A myelogram is a special radiology procedure of the spinal cord, spinal nerves and other related structures.  You will be awake during the examination.  An area of your lower back will be cleansed with an antiseptic solution.  The physician will inject a numbing medication in your lower back.  While your back is numb, a needle will be placed in the lower back area.  A small amount of spinal fluid may be withdrawn and sent to the lab if ordered by your physician. While the needle is in the back, an injection of a contrast material (xray dye) will be given through the needle.  The contrast material will allow the physician to see the spinal cord and spinal nerves.  Once injected, the needle will be removed and a band aid will be placed over the injection site.  The table will be tilted during the process to allow the contrast material to flow to particular areas in the spine.  Following the injection and xrays, you will be taken to the CT scan where more pictures will be taken. After the procedure is finished, the contrast material will be absorbed by your body and eliminated through your kidneys.  The radiologist will study and interpret your myelogram and send the results to your physician.  Procedure risks of a myelogram include, but are not limited to:  *  Bleeding   *  seizure  *  Infection   *  Headache, possibly severe requiring  *  Contrast reaction      a blood patch  *  Nerve or cord injury  *  Paralysis and death  Benefits of the procedure:  *  Best examination for delineating pathology related to spinal cord compression from a disc and/or nerve root compression  Alternatives to the procedure:  MRI - a non invasive procedure requiring intravenous contrast injection.  Cannot be done on patients with certain pacemakers or metal in the body.  MRI risks include possible reaction to the contrast material, movement of metal located in the body.   Benefit to MRI:  Non-invasive  and usually painless procedure.  THIS EDUCATION INFORMATION WAS REVIEWED PRIOR TO THE PROCEDURE AND CONSENT. Patient initials __________________Time_________________  Important information following your myelogram:  *  Lie down with your head elevated no more than 2 pillows for the next 24 hours.   *  Sit up in the car going home.  Sit up to eat and use the restroom only,  for 24 hours.  *  24 HOURS COMPLETE AT tomorrow, Feb 4th after 1000 am.   *  Tomorrow, after 24 hours complete, take it easy and rest.  *  Do not drive for 48 hours following a myelogram  *  You may remove the bandage and shower in the morning  *  Increase your fluids for the next 24 hours.  Caffeinated drinks are encouraged.   Resume taking your blood thinner or Aspirin on tomorrow; Feb 4th after 1000 am.    Resume taking antipsychotics/antidepressant on Wed, Feb 5th, 2020.  CALL YOUR PHYSICIAN FOR THE FOLLOWING:  * Pain at the injection site  * Reddness, swelling, bruising or drainage at the injection site  * A fever by mouth of 101.0  * Any new symptoms  If you have problems with a headache that is not relieved with rest and medication, please call the Radiology Triage Nurse desk  [phone]

## 2020-02-04 ENCOUNTER — TELEPHONE (OUTPATIENT)
Dept: INTERVENTIONAL RADIOLOGY/VASCULAR | Facility: HOSPITAL | Age: 60
End: 2020-02-04
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
## 2020-02-17 ENCOUNTER — OFFICE VISIT (OUTPATIENT)
Dept: NEUROSURGERY | Facility: CLINIC | Age: 60
End: 2020-02-17

## 2020-02-17 VITALS
HEIGHT: 68 IN | BODY MASS INDEX: 34.98 KG/M2 | DIASTOLIC BLOOD PRESSURE: 78 MMHG | HEART RATE: 74 BPM | SYSTOLIC BLOOD PRESSURE: 138 MMHG

## 2020-02-17 DIAGNOSIS — G89.29 CHRONIC NECK PAIN: Primary | ICD-10-CM

## 2020-02-17 DIAGNOSIS — M54.6 CHRONIC MIDLINE THORACIC BACK PAIN: ICD-10-CM

## 2020-02-17 DIAGNOSIS — M54.2 CHRONIC NECK PAIN: Primary | ICD-10-CM

## 2020-02-17 DIAGNOSIS — M50.13 CERVICAL DISC DISORDER WITH RADICULOPATHY OF CERVICOTHORACIC REGION: ICD-10-CM

## 2020-02-17 DIAGNOSIS — G89.29 CHRONIC MIDLINE THORACIC BACK PAIN: ICD-10-CM

## 2020-02-17 PROCEDURE — 99214 OFFICE O/P EST MOD 30 MIN: CPT | Performed by: NEUROLOGICAL SURGERY

## 2020-02-17 RX ORDER — NAPROXEN SODIUM 220 MG
220 TABLET ORAL 2 TIMES DAILY PRN
COMMUNITY
End: 2021-12-15 | Stop reason: HOSPADM

## 2020-02-21 ENCOUNTER — OFFICE VISIT (OUTPATIENT)
Dept: PAIN MEDICINE | Facility: CLINIC | Age: 60
End: 2020-02-21

## 2020-02-21 VITALS
BODY MASS INDEX: 36.68 KG/M2 | DIASTOLIC BLOOD PRESSURE: 82 MMHG | HEART RATE: 98 BPM | HEIGHT: 68 IN | OXYGEN SATURATION: 98 % | TEMPERATURE: 98 F | RESPIRATION RATE: 18 BRPM | SYSTOLIC BLOOD PRESSURE: 122 MMHG | WEIGHT: 242 LBS

## 2020-02-21 DIAGNOSIS — M54.2 CHRONIC NECK PAIN: Primary | ICD-10-CM

## 2020-02-21 DIAGNOSIS — G89.29 CHRONIC MIDLINE THORACIC BACK PAIN: ICD-10-CM

## 2020-02-21 DIAGNOSIS — M47.814 ARTHROPATHY OF THORACIC FACET JOINT: ICD-10-CM

## 2020-02-21 DIAGNOSIS — M54.6 CHRONIC MIDLINE THORACIC BACK PAIN: ICD-10-CM

## 2020-02-21 DIAGNOSIS — G89.29 CHRONIC NECK PAIN: Primary | ICD-10-CM

## 2020-02-21 LAB
POC AMPHETAMINES: NEGATIVE
POC BARBITURATES: NEGATIVE
POC BENZODIAZEPHINES: NEGATIVE
POC COCAINE: NEGATIVE
POC METHADONE: NEGATIVE
POC METHAMPHETAMINE SCREEN URINE: NEGATIVE
POC OPIATES: NEGATIVE
POC OXYCODONE: NEGATIVE
POC PHENCYCLIDINE: NEGATIVE
POC PROPOXYPHENE: NEGATIVE
POC THC: NEGATIVE
POC TRICYCLIC ANTIDEPRESSANTS: NEGATIVE

## 2020-02-21 PROCEDURE — 99214 OFFICE O/P EST MOD 30 MIN: CPT | Performed by: NURSE PRACTITIONER

## 2020-02-21 PROCEDURE — 80305 DRUG TEST PRSMV DIR OPT OBS: CPT | Performed by: NURSE PRACTITIONER

## 2020-02-21 RX ORDER — TIZANIDINE HYDROCHLORIDE 2 MG/1
2 CAPSULE, GELATIN COATED ORAL 3 TIMES DAILY PRN
Qty: 90 CAPSULE | Refills: 0 | Status: ON HOLD | OUTPATIENT
Start: 2020-02-21 | End: 2021-12-13

## 2020-02-21 NOTE — PROGRESS NOTES
CHIEF COMPLAINT  Mr. Duke has neck and upper back pain that started 15 years ago and has gotten worse in the last 1 year.    Initial evaluation by [facility] Domingo Leilani is a 59 y.o. male.   He presents to the office for evaluation of mid back and neck pain. He was referred here by Dr. Velasquez. Office note from 2/17/2020 by Dr. Velasquez reviewed.  Patient was being seen for review of cervical and thoracic spine myelogram.  He had previously been evaluated for neck and upper back pain on 12/30/2019.  He does have a history of L3-S1 open bilateral lumbar decompression on 9/6/2019 with Dr. Velasquez.  His pain is described as mostly mechanical in the mid back and neck without radicular symptoms.  Unfortunately there is no surgical option for his pain.  He has significant facet disease on both the cervical and thoracic myelogram.  He was referred to pain management for consideration of radiofrequency ablation of the neck and the thoracic spine.    Today his primary complaint is the pain in his mid-back.  His pain is 8/10VAS in severity.  He describes this pain as continuous pain aching, burning, and stabbing pain which does not radiate.     Mr. Duke is a  which is a relatively sedentary job.  He lives with  His wife.  He denies any tobacco use, etoh use, or any illicit drug use.  He has a family history of alcoholism (sister).  He has a family history of back and neck issues (grandmother, brothers, nieces, nephews).     Past pain medications: Previously prescribed opioids, he does not tolerate them well      Current pain medications: Tylenol and Aleve     Past therapies:  Physical Therapy: PT, most recently completed in September 2019  Chiropractor: No  Massage Therapy: Yes, with PT  TENS: Yes, with PT  Neck or back surgery: L3-S1 decompression  Past pain management: Dr. Simon  Dry Needling: Yes, with PT     Previous Injections:   LESI  Effect of Injection (%):  0%    Mid-back Facet Injections   55-60% relief  1.5 weeks    Back Pain   This is a chronic problem. The current episode started more than 1 year ago. The problem occurs constantly. The problem has been gradually worsening since onset. The pain is present in the thoracic spine. The quality of the pain is described as aching, burning and stabbing. The pain does not radiate. The pain is at a severity of 8/10. The symptoms are aggravated by bending, twisting, standing and position (prolonged walking). Associated symptoms include numbness and weakness. Risk factors include obesity, poor posture and lack of exercise (being worked up for prostate CA currently). He has tried analgesics, NSAIDs, heat and home exercises (tylenol, aleve) for the symptoms. The treatment provided mild relief.   Neck Pain    This is a chronic problem. The current episode started more than 1 year ago. The problem occurs constantly. The problem has been gradually worsening. The pain is present in the right side. The pain is at a severity of 3/10. The symptoms are aggravated by bending (position during sleep). Associated symptoms include numbness and weakness. He has tried acetaminophen, NSAIDs, home exercises and heat for the symptoms. The treatment provided mild relief.      PEG Assessment   What number best describes your pain on average in the past week?9  What number best describes how, during the past week, pain has interfered with your enjoyment of life?8  What number best describes how, during the past week, pain has interfered with your general activity?  7    Current Outpatient Medications:   •  acetaminophen (TYLENOL) 325 MG tablet, Take 2 tablets by mouth Every 6 (Six) Hours As Needed for Mild Pain  or Fever., Disp: , Rfl:   •  Coenzyme Q10 (CO Q 10) 10 MG capsule, Take 1 capsule by mouth Daily., Disp: , Rfl:   •  escitalopram (LEXAPRO) 20 MG tablet, Take 1 tablet by mouth Daily., Disp: 90 tablet, Rfl: 3  •  naproxen sodium (ALEVE) 220  MG tablet, Take 220 mg by mouth 2 (Two) Times a Day As Needed., Disp: , Rfl:   •  omeprazole (priLOSEC) 20 MG capsule, Take 20 mg by mouth Daily., Disp: , Rfl:   •  rosuvastatin (CRESTOR) 40 MG tablet, Take 1 tablet by mouth Daily., Disp: 90 tablet, Rfl: 3  •  tamsulosin (FLOMAX) 0.4 MG capsule 24 hr capsule, Take 1 capsule by mouth Every Night., Disp: 30 capsule, Rfl:   •  TiZANidine (ZANAFLEX) 2 MG capsule, Take 1 capsule by mouth 3 (Three) Times a Day As Needed for Muscle Spasms., Disp: 90 capsule, Rfl: 0    The following portions of the patient's history were reviewed and updated as appropriate: allergies, current medications, past family history, past medical history, past social history, past surgical history and problem list.    REVIEW OF PERTINENT MEDICAL DATA    POSTMYELOGRAM CT OF THE CERVICAL AND THORACIC SPINE 02/03/2020     CLINICAL HISTORY: Cervical and thoracic radiculitis and cervical and  thoracic pain.     CERVICAL THORACIC MYELOGRAM TECHNIQUE: Dr. Hernandez from radiology  performed lumbar puncture and administered 10 mL of Isovue-M 300 into  the lumbar thecal sac, and subsequently ran the contrast to the cervical  thecal sac, at which time AP bilateral obliques lateral and swimmer's  lateral views were obtained of the cervical spine, subsequently AP and  lateral views were obtained of the upper and lower thoracic spine.     FINDINGS ON MYELOGRAM: Unfortunately there is very little contrast that  extended into the cervical and thoracic thecal sac, and this is a  nondiagnostic myelogram. Cervical spinal cord and cervical nerve sleeves  are not assessed nor is the thoracic cord or thecal sac. 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     POSTMYELOGRAM CT OF THE CERVICAL SPINE TECHNIQUE: Following myelogram,  spiral CT images were obtained from the skull base down to the T2  thoracic level,  and images were reformatted and submitted in 2 mm thick  axial CT section with bone algorithm, and 2 mm thick axial, sagittal and  coronal CT sections with soft tissue algorithm.     COMPARISON: This is correlated to prior cervical spine MRI from [facility] on 07/04/2019.     FINDINGS: Craniocervical junction is normal in appearance. There are  very minimal arthritic changes at the atlantodental interval. Otherwise,  C1-C2 level is normal in appearance. Furthermore, at C2-C3, disc space,  facets and uncovertebral joints are normal with no canal or foraminal  narrowing.     At C3-C4, there is mild disc space narrowing, there are degenerative  endplate changes, diffuse posterior disc osteophyte complex, eccentric  right posterolateral and mildly narrows the right lateral aspect of the  canal, spurs abut the ventral aspect of the ventral root to the right C4  nerve root as it extends from the cord into the right neural foramen,  and also uncovertebral joint spurs result in moderate right bony  foraminal narrowing at C3-C4. There is no narrowing of the central and  left-sided canal or left foramen at C3-C4.     At C4-C5, there is very minimal posterior spurring, bulging disc  material results in minimal if any narrowing of the thecal sac. There is  mild right facet overgrowth, left facets and uncovertebral joints are  normal, and there is essentially no foraminal narrowing.     At C5-C6, there is mild disc space narrowing and degenerative endplate  changes, minimal posterior spurring, but no canal narrowing. The facets  and uncovertebral joints are within normal limits and there is no  foraminal narrowing.     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 The vertebrae are  hypoplastic and fused as are the posterior  elements. There is no canal or foraminal narrowing at C6-C7.     At C7-T1, there is a posterior central and right paracentral disc  herniation abuts the ventral aspect of the spinal cord minimally  narrowing the canal.  There is mild-to-moderate right facet overgrowth.  The left facets are normal. There is mild right, but no left foraminal  narrowing.     IMPRESSION:  1. No change since cervical spine MRI 07/04/2019.  2. 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  3. Mild cervical spondylosis as described at C3-C4, right posterior  lateral disc osteophyte complex mildly narrows right lateral aspect of  the canal, abuts the ventral aspect of the ventricle root right C4 nerve  root as it extends from the cord toward the right foramen, right  uncovertebral joint hypertrophy moderately narrows the right foramen,  abuts the right C4 nerve root as it exits the right foramen at C3-C4.  4. At C7-T1, there is a posterior central right paracentral disc  herniation that abuts the ventral aspect of the cervical spinal cord  mildly narrowing the central portion of the canal, and there is right  facet overgrowth with mild right bony foraminal narrowing C7-T1. The  remainder of the postmyelogram CT of the cervical spine is unremarkable.     POSTMYELOGRAM CT OF THORACIC SPINE TECHNIQUE: Following myelogram,  spiral CT images were obtained from the C7 cervical level down to the L3  lumbar level and images were reformatted and submitted in 2 mm thick  axial CT sections with bone algorithm and 2 mm thick axial, sagittal and  coronal CT sections with soft tissue algorithm.     FINDINGS: There is scoliotic curvature of the thoracic spine with a  dextroscoliotic curvature of the upper to mid thoracic spine apex at the  T6-T7 level, levoscoliotic curvature mid  thoracic spine apex at the  T9-T10 level and a dextroscoliotic curvature of the lower thoracic and  upper lumbar spine apex at the T12-L1 lumbar level. There are multiple  Schmorl's nodes indenting the endplates in the mid and lower thoracic  spine including the inferior endplate of T7-T8 endplates at T9-T10,  T11-T12. 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 There is also  some flowing ossification along the anterior endplates from T10 to L2,  likely serving to partially fuse the thoracic spine anteriorly from T10  to L2. There is a prominent posterior epidural fat and lateral epidural  fat in the thoracic spine from T5 down to T11 resulting in somewhat  small size to the thoracic thecal sac. Reidentified is a posterior  central right paracentral disc herniation C7-T1 abuts the ventral  surface cord mildly narrowing the canal. There is mild right bony  foraminal narrowing.     At T1-T2, T2-T3, T3-T4, posterior disc margin and facets are normal with  no canal or foraminal narrowing.     At T4-T5, there is right facet overgrowth, facet spurs mildly narrow the  right foramen. There is minimal posterior central disc bulge, but there  is no central canal or left foraminal narrowing.     At T5-T6, there is right facet overgrowth, facet spurs mildly narrowing  the right foramen. Posterior disc margin and facets are normal. There is  no canal or foraminal narrowing.     At T6-T7, there is left facet overgrowth, left facet spurs mild to  moderately narrows the left foramen. Posterior disc margin is normal  with no canal or right foraminal narrowing.     At T7-T8, left facet spurs mild to moderately narrows the left foramen.  Posterior disc margin and right facets are normal. There is no canal or  right foraminal narrowing.     At T8-T9, facet spurs extending into the  posterior superior foramen.  There is only mild-to-moderate bilateral foraminal narrowing. There is  prominent posterior epidural fat resulting in small-sized thecal sac.  There is essentially no canal narrowing.     At T9-T10, there is narrowed disc space, may be some bony bridging  across the endplates, some flowing ossification along the right  anterolateral aspect of the endplates, likely fusing the T9-T10  vertebrae. Posterior disc margin and facets are normal with no canal or  foraminal narrowing.     At T10-T11, there is some bony bridging across endplates, flowing  ossification in the anterior endplates serving to fuse the vertebrae  anteriorly. Posterior disc margin and facets are normal. There is no  canal or foraminal narrowing.     At T11-T12, there is flowing ossification fusing the vertebrae  anteriorly. Posterior disc margin and facets are normal.There is  essentially no canal or foraminal narrowing.     At T12-L1, posterior disc margin is normal. There is no canal narrowing.  There is facet spurring into the left foramen, mild-to-moderate left  foraminal narrowing. There is no right foraminal narrowing.      IMPRESSION:  1. There are multiple Schmorl's nodes indenting the endplates from T7  down to T12 with some anterior wedging from T9 to T12 suggesting a form  of Scheuermann's involving the lower thoracic spine and there are some  flowing ossification along the anterior vertebrae from T9 to L2 serving  to at least partially fuse the vertebrae anteriorly from T9 to L2.  2. There is prominent posterior and right and left lateral epidural fat  throughout the thoracic spine from T5 down to T11 resulting in somewhat  small size to the thoracic thecal sac, but no thoracic disc herniation  and no thoracic central canal narrowing is seen.  3. There is some facet spurring into the neural foramina resulting in  multilevel foraminal narrowing of the thoracic spine including mild  right foraminal narrowing at  "T4-T5 and T5-T6, moderate left foraminal  narrowing at T6-T7, and left foraminal narrowing at T7-T8, moderate  bilateral foraminal narrowing at T8-T9.        Radiation dose reduction techniques were utilized, including automated  exposure control and exposure modulation based on body size.     This report was finalized on 2/4/2020 11:40 AM by Dr. Kit Cordero M.D.          Review of Systems   Constitutional: Negative for fatigue.   HENT: Positive for hearing loss (left ear). Negative for congestion.    Eyes: Positive for visual disturbance.   Respiratory: Negative for cough, shortness of breath and wheezing.    Cardiovascular: Negative.    Gastrointestinal: Negative for constipation and diarrhea.   Genitourinary: Positive for difficulty urinating.   Musculoskeletal: Positive for back pain and neck pain.   Neurological: Positive for weakness and numbness.   Psychiatric/Behavioral: Positive for sleep disturbance. Negative for suicidal ideas. The patient is not nervous/anxious.      Vitals:    02/21/20 0900   BP: 122/82   Pulse: 98   Resp: 18   Temp: 98 °F (36.7 °C)   SpO2: 98%   Weight: 110 kg (242 lb)   Height: 172.7 cm (68\")   PainSc:   8   PainLoc: Back     Objective   Physical Exam   Constitutional: He is oriented to person, place, and time. Vital signs are normal. He appears well-developed and well-nourished.   HENT:   Head: Normocephalic and atraumatic.   Eyes: Conjunctivae and EOM are normal.   Neck: Muscular tenderness present. Decreased range of motion present.   Cardiovascular: Normal rate.   Pulmonary/Chest: Effort normal.   Musculoskeletal:        Cervical back: He exhibits decreased range of motion, tenderness, bony tenderness and pain.        Thoracic back: He exhibits decreased range of motion, tenderness, pain and spasm.   Neurological: He is alert and oriented to person, place, and time. Gait normal.   Skin: Skin is warm, dry and intact.   Psychiatric: He has a normal mood and affect. His speech is " "normal and behavior is normal. Judgment and thought content normal.   Nursing note and vitals reviewed.    Assessment/Plan   Familia was seen today for neck pain.    Diagnoses and all orders for this visit:    Chronic neck pain    Chronic midline thoracic back pain    Arthropathy of thoracic facet joint  -     Cancel: Case Request  -     Case Request    Other orders  -     TiZANidine (ZANAFLEX) 2 MG capsule; Take 1 capsule by mouth 3 (Three) Times a Day As Needed for Muscle Spasms.      --- Bilateral Medial Branch Block at approximately T4-T7 x 2, 2-4 weeks apart  -------  Education about Medial Branch Blockade and RF Therapy:    This medial branch blockade (MBB) suggested is intended for diagnostic purposes, with the intent of offering the patient Radiofrequency thermal rhizotomy (RF) if the MBB is diagnostically effective.  The diagnostic blockade is necessary to determine the likelihood that RF therapy could be efficacious in providing long term relief to the patient.    Medial branches are sensory nerve branches that connect to a facet joint and transmit sensations & pain signals from that joint.  Facet is a term for the type of joints found in the spine.  Medial branches are the nerves that go to a facet, and therefore are also sometimes called \"facet joint nerves\" (FJNs).      In a medial branch blockade procedure, xray fluoroscopy is used to verify the locations of the outside of the joint lines which are being targeted.  Under xray guidance, needles are placed to these areas.  Contrast dye is injected to confirm proper placement, with dye flowing over the joint area, and to ensure that the dye does not flow into unintended areas such as a vein.  When this is confirmed, local anesthetic is injected to block the medial branch at that joint level.      If MBBs are diagnostically successful in blocking pain, then the patient is most likely a great candidate for Radiofrequency of those facet joint nerves.  In the " RF procedure, needles are placed to the joint lines in the same fashion, and after testing, the needle tips are heated to thermally treat the nerves, blocking the nerves by in essence damaging the nerves with the heat treatment.       Medically, a successful RF procedure should provide a patient with 50% pain relief or more for at least 6 months.  Clinical experience suggests that successful patients receive relief more in the range of 12 months on average.  We also discussed that a fortunate minority of patients receive therapeutic success from the MBB, and may not require RF ablation.  If a patient receives more than 8 weeks of relief from MBB, then occasional repeat MBB for therapeutic purposes is a very reasonable alternative therapy.  This course of therapy is consistent with our LCDs according to our CMS  in the area, and therefore other insurance providers should follow accordingly.  We will monitor our patients to screen for these therapeutic responders and will offer RF therapy only when necessary.      We discussed that MBB & RF are not without risks.  Guidelines regarding anticoagulant use & neuraxial procedures will be respected.  Patients that are ill or otherwise may be at risk for sepsis will not have their spines accessed by neuraxial injections of any type.  This patient will not be offered these therapies if there is an increased risk.   We discussed that there is a risk of postprocedural pain and also a risk of worsening of clinical picture with these procedures as with any neuraxial procedure.    -------  --- Trial Tizanidine for muscle spasms. Discussed medication with the patient.  Included in this discussion was the potential for side effects and adverse events.  Patient verbalized understanding and wished to proceed.  Prescription will be sent to pharmacy.  --- Follow-up after procedure     MILO REPORT    As part of the patient's treatment plan, I am prescribing controlled  substances. The patient has been made aware of appropriate use of such medications, including potential risk of somnolence, limited ability to drive and/or work safely, and the potential for dependence or overdose. It has also bee made clear that these medications are for use by this patient only, without concomitant use of alcohol or other substances unless prescribed.     Patient has completed prescribing agreement detailing terms of continued prescribing of controlled substances, including monitoring MILO reports, urine drug screening, and pill counts if necessary. The patient is aware that inappropriate use will results in cessation of prescribing such medications.    MILO report has been reviewed and scanned into the patient's chart.    As the clinician, I personally reviewed the MILO from 2/20/2020 while the patient was in the office today.    History and physical exam exhibit continued safe and appropriate use of controlled substances.     EMR Dragon/Transcription disclaimer:   Much of this encounter note is an electronic transcription/translation of spoken language to printed text. The electronic translation of spoken language may permit erroneous, or at times, nonsensical words or phrases to be inadvertently transcribed; Although I have reviewed the note for such errors, some may still exist.

## 2020-02-24 ENCOUNTER — DOCUMENTATION (OUTPATIENT)
Dept: PHYSICAL THERAPY | Facility: HOSPITAL | Age: 60
End: 2020-02-24

## 2020-02-24 DIAGNOSIS — G89.29 CHRONIC MIDLINE THORACIC BACK PAIN: ICD-10-CM

## 2020-02-24 DIAGNOSIS — M54.6 CHRONIC MIDLINE THORACIC BACK PAIN: ICD-10-CM

## 2020-02-24 DIAGNOSIS — M48.062 SPINAL STENOSIS OF LUMBAR REGION WITH NEUROGENIC CLAUDICATION: Primary | ICD-10-CM

## 2020-02-24 NOTE — THERAPY DISCHARGE NOTE
Outpatient Physical Therapy Discharge Summary         Patient Name: Familia Duke  : 1960  MRN: [id number]    Today's Date: 2020    Visit Dx:    ICD-10-CM ICD-9-CM   1. Spinal stenosis of lumbar region with neurogenic claudication M48.062 724.03   2. Chronic midline thoracic back pain M54.6 724.1    G89.29 338.29       PT OP Goals     Row Name 20 0700          PT Short Term Goals    STG Date to Achieve  19  -GJ     STG 1  Pt will be independent in initial HEP without exacerbation of pain.  -GJ     STG 1 Progress  Met  -GJ     STG 2  Pt will improve postural awareness & awareness of proper body mechanics to decrease strain on spine during functional tasks & work activities.  -GJ     STG 2 Progress  Partially Met  -GJ     STG 3  Pt will demonstrate proper core stabilization with initial lumbar stabilization exercises.  -GJ     STG 3 Progress  Partially Met  -GJ     STG 4  Pt will demonstrate decreased pain to allow improved gait pattern, with increased arm swing & trunk rotation, for improved mobility  -GJ     STG 4 Progress  Not Met  -GJ        Long Term Goals    LTG Date to Achieve  19  -GJ     LTG 1  Pt will be independent in comprehensive HEP & symptom management to allow continued self-care independently.  -GJ     LTG 1 Progress  Partially Met  -GJ     LTG 2  Pt will report improvement in function as indicated by improved Oswestry Back score from 62% to 50% or less.  -GJ     LTG 2 Progress  Not Met  -GJ     LTG 3  Pt will demonstrate improvement in LE flexibility to minimal restrictions to improve mechanics of spine with functional mobility.  -GJ     LTG 3 Progress  Not Met  -GJ     LTG 4  Pt will demonstrate lumbar AROM WFL without increased pain to allow improved functional mobility and tolerance to ADLs/recreational activities.  -GJ     LTG 4 Progress  Not Met  -GJ     LTG 5  Pt will be independent in postural awareness & proper body mechanics using core stabilization to  reduce risk of reinjury to spine during ADLs, work activities and recreational activities (woodworking).  -JIM     LTG 5 Progress  Partially Met  -GJ       User Key  (r) = Recorded By, (t) = Taken By, (c) = Cosigned By    Initials Name Provider Type    Reed Hawley, PT Physical Therapist          OP PT Discharge Summary  Date of Discharge: 02/24/20  Reason for Discharge: Independent  Outcomes Achieved: Patient able to partially acheive established goals  Discharge Destination: Home with home program  Discharge Instructions/Additional Comments: Mr. Duke attended 9 sessions of physical therapy from 7/19//19 for his LBP.  He was independent with his HEP, verbalized a good understanding of his condition.  He contiued to report increased pain. Mr. Duke is discharged from outpatient physical therapy to independent management.       Time Calculation:                    Reed Florentino, PT  2/24/2020

## 2020-02-26 ENCOUNTER — RESULTS ENCOUNTER (OUTPATIENT)
Dept: PAIN MEDICINE | Facility: CLINIC | Age: 60
End: 2020-02-26

## 2020-02-26 DIAGNOSIS — M47.814 ARTHROPATHY OF THORACIC FACET JOINT: ICD-10-CM

## 2020-02-26 DIAGNOSIS — G89.29 CHRONIC MIDLINE THORACIC BACK PAIN: ICD-10-CM

## 2020-02-26 DIAGNOSIS — M54.6 CHRONIC MIDLINE THORACIC BACK PAIN: ICD-10-CM

## 2020-02-26 DIAGNOSIS — M54.2 CHRONIC NECK PAIN: ICD-10-CM

## 2020-02-26 DIAGNOSIS — G89.29 CHRONIC NECK PAIN: ICD-10-CM

## 2020-03-18 ENCOUNTER — DOCUMENTATION (OUTPATIENT)
Dept: PAIN MEDICINE | Facility: CLINIC | Age: 60
End: 2020-03-18

## 2020-03-18 ENCOUNTER — OUTSIDE FACILITY SERVICE (OUTPATIENT)
Dept: PAIN MEDICINE | Facility: CLINIC | Age: 60
End: 2020-03-18

## 2020-03-18 PROCEDURE — 64491 INJ PARAVERT F JNT C/T 2 LEV: CPT | Performed by: ANESTHESIOLOGY

## 2020-03-18 PROCEDURE — 64492 INJ PARAVERT F JNT C/T 3 LEV: CPT | Performed by: ANESTHESIOLOGY

## 2020-03-18 PROCEDURE — 64490 INJ PARAVERT F JNT C/T 1 LEV: CPT | Performed by: ANESTHESIOLOGY

## 2020-03-18 NOTE — PROGRESS NOTES
Thoracic Medial Branch Blockades:  [facility]      PREOPERATIVE DIAGNOSIS:  Thoracic spondylosis without myelopathy    POSTOPERATIVE DIAGNOSIS:  Same as above.       PROCEDURE:   Diagnostic Thoracic Medial Branch Nerve Blockades, with fluoroscopy:   - LEVELS:  left  T7, T8, T9 and T10    PRE-PROCEDURE DISCUSSION WITH PATIENT:    Risks and complications were discussed with the patient prior to starting the procedure and informed consent was obtained.   We discussed various topics including but not limited to bleeding, infection, injury, nerve injury, paralysis, coma, death, pneumothorax, worsening of clinical picture, postprocedural soreness, and lack of pain relief.  We discussed the diagnostic nature of this procedure as well as the potential for possible long term therapeutic relief, and the odds of such.    SURGEON:  Javon Springer MD    REASON FOR PROCEDURE:    Painful area identified on careful exam under fluoroscopy    SEDATION:  Versed 4mg IV  ANESTHETIC:  Marcaine 0.5%  STEROID:  Dexamethasone 4mg  TOTAL VOLUME OF SOLUTION:  4 ml    DESCRIPTON OF PROCEDURE:  After obtaining informed consent, IV access was  obtained in the preoperative area.   The patient was taken to the operating room.  The patient was placed in the prone position with a pillow under the abdomen. All pressure points were well padded.  EKG, blood pressure, and pulse oximeter were monitored.  The patient was monitored and sedated by the RN under my direction. The thoracic area was prepped with Chloraprep and draped in a sterile fashion.     Under fluoroscopic guidance at each of the levels to address, as above, the transverse processes those vertebrae at the junctions of the superior articular processes were identified.  Skin and subcutaneous tissue were anesthetized with 1% lidocaine above each of these points.     A spinal needle was introduced under fluoroscopic guidance toward each of the above junctions, advancing  under serial fluoroscopic images to ensure that the needle tip was overlying the transverse process throughout the course of each needle.  Then a lateral image was taken for each needle position to ensure proper depth and that the needles were near the joint.  Aspiration was negative for blood and CSF.  After confirming the position of the needle with fluoroscope in all views, 0.25 mL of Omnipaque was injected, and after seeing the proper spread a total of 1 mL of the anesthetic solution noted above was injected at each of these points.  Needles were removed intact from each of the areas.  Onset of analgesia was noted.  Vital signs remained stable throughout.        ESTIMATED BLOOD LOSS:  <5 mL  SPECIMENS:  none    COMPLICATIONS:   No complications were noted., There was no indication of vascular uptake on live injection of contrast dye., There was no indication of intrathecal uptake on live injection of contrast dye. and The patient did not have any signs of postprocedure numbness nor weakness.    TOLERANCE & DISCHARGE CONDITION:    The patient tolerated the procedure well.  The patient was transported to the recovery area without difficulties.  The patient was discharged to home under the care of family in stable and satisfactory condition.    PLAN OF CARE:  1. The patient was given our standard instruction sheet.  2. We discussed that Medial Branch Blockade is a diagnostic procedure in consideration for radiofrequency ablation if two diagnostic procedures prove to be positive for significant benefit.  If sustained relief of 6 to eight weeks is obtained, then an alternative plan could be therapeutic lumbar branch blockades.  3. The patient is asked to keep a pain log each hour for 8 hours after the procedure today.  4. The patient will  Repeat injection 2-4 wks  5. The patient will resume all medications as per the medication reconciliation sheet.

## 2020-03-19 NOTE — PROGRESS NOTES
It is important to address the need for an urgent procedure at this time.  In the [facility], and in response to the Covid-19 public health emergency, our Governor issued Executive Order 2020-215 which directs cancellation of procedures when delay will not be harmful to the patient.  [facility] has decided to proceed with the provision of procedures when there is urgency in accordance to this directive.  As an employed physician of [facility], I have been instructed to proceed because there is both a public health need as well as a specific need to help this patient urgently.  I have been advised that the goal is to relieve severe pain in order to avoid an emergency room visit as well as to avoid risks of poly-pharmacy and pain medication escalation in the context of heightened risks of pulmonary complications related to Covid-19.  [facility] posits that this is in the best interest of the patient, our city, and our [facility].

## 2020-06-08 DIAGNOSIS — Z12.5 SCREENING PSA (PROSTATE SPECIFIC ANTIGEN): ICD-10-CM

## 2020-06-08 DIAGNOSIS — Z00.00 PREVENTATIVE HEALTH CARE: Primary | ICD-10-CM

## 2020-06-09 NOTE — PROGRESS NOTES
Subjective   History of Present Illness: Familia Duke is a 59 y.o. male for televisit follow up after thoracic MBB with Dr Springer 3.18.20 and 6.12.20.     Patient had surgery 9.6.19, L3-S1 open bilateral lumbar decompression    Patient was last seen 2.17.20 for neck and upper back pain, numbness bilateral fingers, occipital headaches and problems with his balance and gait.  Pt is still having back/neck pain and some numbness in his pinky fingers.  He has been falling a lot.    You have chosen to receive care through a telephone visit. Do you consent to use a telephone visit for your medical care today? Yes    This was a TeleVisit from my office.  The patient was at home.  The visit lasted 7 minutes.  He has mainly thoracic pain.  We had sent him to Dr. Springer and he is in the process of getting his medial branch blocks.  The 1st one 3 weeks ago helped somewhat and he is going to get another one in a few weeks.  I encouraged him to strongly consider the ablation, which could be helpful for his back pain.  I told him there are not any surgical treatments.  He does have a C7-T1 small disk herniation on the right and he has been having some bilateral hand numbness and tingling.  The bilaterality of it suggests possibly ulnar compression, but it is not overly bothersome right now and I told him to keep us posted about that.  Otherwise, he is to come back in 4 months and hopefully by that time he will have had his thoracic ablation.  Dr. pSringer is working with him.        Back Pain   This is a chronic problem. The current episode started more than 1 year ago. The problem occurs constantly. The problem has been gradually worsening since onset. The pain is present in the thoracic spine. The quality of the pain is described as burning and stabbing. The pain does not radiate. The pain is at a severity of 9/10. The pain is moderate. The pain is the same all the time. The symptoms are aggravated by sitting and lying  down. Associated symptoms include headaches, numbness (pinky fingers when sleeping) and weakness (bilateral arms).   Neck Pain    This is a chronic problem. The current episode started more than 1 year ago. The problem occurs intermittently. The problem has been unchanged. The pain is associated with nothing. The pain is present in the right side. The quality of the pain is described as cramping. The pain is at a severity of 5/10. The pain is moderate. The symptoms are aggravated by twisting and bending. Associated symptoms include headaches, numbness (pinky fingers when sleeping) and weakness (bilateral arms).       The following portions of the patient's history were reviewed and updated as appropriate: allergies, current medications, past family history, past medical history, past social history, past surgical history and problem list.    Review of Systems   Musculoskeletal: Positive for back pain (thoracic pain), gait problem (falls alot) and neck pain.   Neurological: Positive for weakness (bilateral arms), numbness (pinky fingers when sleeping) and headaches.   Psychiatric/Behavioral: Positive for sleep disturbance (thoracic spine).       Objective             Physical Exam   Deferred  Neurologic Exam   Deferred        Assessment/Plan   Independent Review of Radiographic Studies:      I personally reviewed the images from the following studies.     reviewed the recently completed cervical and thoracic myelogram.  There is quite a bit of facet disease a bit of kyphosis and disc degeneration.  There may be an element of Scheurmans disease in the thoracic spine.  He does have a right C7-T1 small disc herniation and quite a bit of facet disease and disc space collapse in the neck.  Agree with the report.    Medical Decision Making:      I urged him to continue working with Dr. Springer and get get the next set of blocks and possibly radiofrequency ablation.  He will keep me posted on what is happening with his  hands and if he gets worse particular on the right he needs to come earlier than the 4 months that we will see him from now.      Familia was seen today for neck pain and back pain.    Diagnoses and all orders for this visit:    Chronic midline thoracic back pain    Cervical disc disorder with radiculopathy of cervicothoracic region      Return in about 4 months (around 10/15/2020).

## 2020-06-10 ENCOUNTER — LAB REQUISITION (OUTPATIENT)
Dept: LAB | Facility: HOSPITAL | Age: 60
End: 2020-06-10

## 2020-06-10 DIAGNOSIS — Z00.00 ENCOUNTER FOR GENERAL ADULT MEDICAL EXAMINATION WITHOUT ABNORMAL FINDINGS: ICD-10-CM

## 2020-06-10 PROCEDURE — U0004 COV-19 TEST NON-CDC HGH THRU: HCPCS | Performed by: ANESTHESIOLOGY

## 2020-06-11 LAB
ALBUMIN SERPL-MCNC: 4.1 G/DL (ref 3.5–5.2)
ALBUMIN/GLOB SERPL: 1.5 G/DL
ALP SERPL-CCNC: 79 U/L (ref 39–117)
ALT SERPL-CCNC: 31 U/L (ref 1–41)
APPEARANCE UR: CLEAR
AST SERPL-CCNC: 34 U/L (ref 1–40)
BACTERIA #/AREA URNS HPF: NORMAL /HPF
BASOPHILS # BLD AUTO: 0.01 10*3/MM3 (ref 0–0.2)
BASOPHILS NFR BLD AUTO: 0.2 % (ref 0–1.5)
BILIRUB SERPL-MCNC: 0.4 MG/DL (ref 0.2–1.2)
BILIRUB UR QL STRIP: NEGATIVE
BUN SERPL-MCNC: 11 MG/DL (ref 8–23)
BUN/CREAT SERPL: 9.6 (ref 7–25)
CALCIUM SERPL-MCNC: 8.9 MG/DL (ref 8.6–10.5)
CHLORIDE SERPL-SCNC: 108 MMOL/L (ref 98–107)
CHOLEST SERPL-MCNC: 108 MG/DL (ref 0–200)
CHOLEST/HDLC SERPL: 5.14 {RATIO}
CO2 SERPL-SCNC: 22.6 MMOL/L (ref 22–29)
COLOR UR: YELLOW
CREAT SERPL-MCNC: 1.15 MG/DL (ref 0.76–1.27)
EOSINOPHIL # BLD AUTO: 0.11 10*3/MM3 (ref 0–0.4)
EOSINOPHIL NFR BLD AUTO: 2.2 % (ref 0.3–6.2)
EPI CELLS #/AREA URNS HPF: NORMAL /HPF (ref 0–10)
ERYTHROCYTE [DISTWIDTH] IN BLOOD BY AUTOMATED COUNT: 13.8 % (ref 12.3–15.4)
GLOBULIN SER CALC-MCNC: 2.7 GM/DL
GLUCOSE SERPL-MCNC: 123 MG/DL (ref 65–99)
GLUCOSE UR QL: NEGATIVE
HBA1C MFR BLD: 6.03 % (ref 4.8–5.6)
HCT VFR BLD AUTO: 39.9 % (ref 37.5–51)
HDLC SERPL-MCNC: 21 MG/DL (ref 40–60)
HGB BLD-MCNC: 13.3 G/DL (ref 13–17.7)
HGB UR QL STRIP: NEGATIVE
IMM GRANULOCYTES # BLD AUTO: 0.01 10*3/MM3 (ref 0–0.05)
IMM GRANULOCYTES NFR BLD AUTO: 0.2 % (ref 0–0.5)
KETONES UR QL STRIP: NEGATIVE
LDLC SERPL CALC-MCNC: ABNORMAL MG/DL
LEUKOCYTE ESTERASE UR QL STRIP: NEGATIVE
LYMPHOCYTES # BLD AUTO: 1.3 10*3/MM3 (ref 0.7–3.1)
LYMPHOCYTES NFR BLD AUTO: 26.5 % (ref 19.6–45.3)
MCH RBC QN AUTO: 30.5 PG (ref 26.6–33)
MCHC RBC AUTO-ENTMCNC: 33.3 G/DL (ref 31.5–35.7)
MCV RBC AUTO: 91.5 FL (ref 79–97)
MICRO URNS: NORMAL
MICRO URNS: NORMAL
MONOCYTES # BLD AUTO: 0.32 10*3/MM3 (ref 0.1–0.9)
MONOCYTES NFR BLD AUTO: 6.5 % (ref 5–12)
MUCOUS THREADS URNS QL MICRO: PRESENT /HPF
NEUTROPHILS # BLD AUTO: 3.15 10*3/MM3 (ref 1.7–7)
NEUTROPHILS NFR BLD AUTO: 64.4 % (ref 42.7–76)
NITRITE UR QL STRIP: NEGATIVE
NRBC BLD AUTO-RTO: 0.2 /100 WBC (ref 0–0.2)
PH UR STRIP: 5.5 [PH] (ref 5–7.5)
PLATELET # BLD AUTO: 133 10*3/MM3 (ref 140–450)
POTASSIUM SERPL-SCNC: 4 MMOL/L (ref 3.5–5.2)
PROT SERPL-MCNC: 6.8 G/DL (ref 6–8.5)
PROT UR QL STRIP: NEGATIVE
PSA SERPL-MCNC: 7.56 NG/ML (ref 0–4)
RBC # BLD AUTO: 4.36 10*6/MM3 (ref 4.14–5.8)
RBC #/AREA URNS HPF: NORMAL /HPF (ref 0–2)
REF LAB TEST METHOD: NORMAL
SARS-COV-2 RNA RESP QL NAA+PROBE: NOT DETECTED
SODIUM SERPL-SCNC: 141 MMOL/L (ref 136–145)
SP GR UR: 1.02 (ref 1–1.03)
TRIGL SERPL-MCNC: 411 MG/DL (ref 0–150)
TSH SERPL DL<=0.005 MIU/L-ACNC: 3.08 UIU/ML (ref 0.45–4.5)
URINALYSIS REFLEX: NORMAL
UROBILINOGEN UR STRIP-MCNC: 0.2 MG/DL (ref 0.2–1)
VLDLC SERPL CALC-MCNC: ABNORMAL MG/DL
WBC # BLD AUTO: 4.9 10*3/MM3 (ref 3.4–10.8)
WBC #/AREA URNS HPF: NORMAL /HPF (ref 0–5)

## 2020-06-12 ENCOUNTER — OUTSIDE FACILITY SERVICE (OUTPATIENT)
Dept: PAIN MEDICINE | Facility: CLINIC | Age: 60
End: 2020-06-12

## 2020-06-12 ENCOUNTER — DOCUMENTATION (OUTPATIENT)
Dept: PAIN MEDICINE | Facility: CLINIC | Age: 60
End: 2020-06-12

## 2020-06-12 PROCEDURE — 64491 INJ PARAVERT F JNT C/T 2 LEV: CPT | Performed by: ANESTHESIOLOGY

## 2020-06-12 PROCEDURE — 64490 INJ PARAVERT F JNT C/T 1 LEV: CPT | Performed by: ANESTHESIOLOGY

## 2020-06-12 NOTE — PROGRESS NOTES
Thoracic Medial Branch Blockades:  [facility]      PREOPERATIVE DIAGNOSIS:  Thoracic spondylosis without myelopathy    POSTOPERATIVE DIAGNOSIS:  Same as above.       PROCEDURE:   Diagnostic Thoracic Medial Branch Nerve Blockades, with fluoroscopy:   - LEVELS:  left  T9, T10 and T11    PRE-PROCEDURE DISCUSSION WITH PATIENT:    Risks and complications were discussed with the patient prior to starting the procedure and informed consent was obtained.   We discussed various topics including but not limited to bleeding, infection, injury, nerve injury, paralysis, coma, death, pneumothorax, worsening of clinical picture, postprocedural soreness, and lack of pain relief.  We discussed the diagnostic nature of this procedure as well as the potential for possible long term therapeutic relief, and the odds of such.    SURGEON:  Javon Springer MD    REASON FOR PROCEDURE:    Previous dx+, but after another careful exam under fluoroscopy his pain was centered over the T11 & T12 vertebral levels, so therefore the change in MBB levels    SEDATION:  Versed 4mg IV  ANESTHETIC:  Marcaine 0.5%  STEROID:  Dexamethasone 6mg  TOTAL VOLUME OF SOLUTION:  6ml    DESCRIPTON OF PROCEDURE:  After obtaining informed consent, IV access was  obtained in the preoperative area.   The patient was taken to the operating room.  The patient was placed in the prone position with a pillow under the abdomen. All pressure points were well padded.  EKG, blood pressure, and pulse oximeter were monitored.  The patient was monitored and sedated by the RN under my direction. The thoracic area was prepped with Chloraprep and draped in a sterile fashion.     Under fluoroscopic guidance at each of the levels to address, as above, the transverse processes those vertebrae at the junctions of the superior articular processes were identified.  Skin and subcutaneous tissue were anesthetized with 1% lidocaine above each of these points.     A spinal  needle was introduced under fluoroscopic guidance toward each of the above junctions, advancing under serial fluoroscopic images to ensure that the needle tip was overlying the transverse process throughout the course of each needle.  Then a lateral image was taken for each needle position to ensure proper depth and that the needles were near the joint.  Aspiration was negative for blood and CSF.  After confirming the position of the needle with fluoroscope in all views, 0.25 mL of Omnipaque was injected, and after seeing the proper spread a total of 1 mL of the anesthetic solution noted above was injected at each of these points.  Needles were removed intact from each of the areas.  Onset of analgesia was noted.  Vital signs remained stable throughout.        ESTIMATED BLOOD LOSS:  <5 mL  SPECIMENS:  none    COMPLICATIONS:   No complications were noted., There was no indication of vascular uptake on live injection of contrast dye., There was no indication of intrathecal uptake on live injection of contrast dye., There was not any evidence of dural puncture.   and The patient did not have any signs of postprocedure numbness nor weakness.    TOLERANCE & DISCHARGE CONDITION:    The patient tolerated the procedure well.  The patient was transported to the recovery area without difficulties.  The patient was discharged to home under the care of family in stable and satisfactory condition.    PLAN OF CARE:  1. The patient was given our standard instruction sheet.  2. We discussed that Medial Branch Blockade is a diagnostic procedure in consideration for radiofrequency ablation if two diagnostic procedures prove to be positive for significant benefit.  If sustained relief of 6 to eight weeks is obtained, then an alternative plan could be therapeutic lumbar branch blockades.  3. The patient is asked to keep a pain log each hour for 8 hours after the procedure today.  4. The patient will  Return to clinic 2 wks  5. The  patient will resume all medications as per the medication reconciliation sheet.

## 2020-06-15 ENCOUNTER — OFFICE VISIT (OUTPATIENT)
Dept: NEUROSURGERY | Facility: CLINIC | Age: 60
End: 2020-06-15

## 2020-06-15 DIAGNOSIS — M50.13 CERVICAL DISC DISORDER WITH RADICULOPATHY OF CERVICOTHORACIC REGION: ICD-10-CM

## 2020-06-15 DIAGNOSIS — M54.6 CHRONIC MIDLINE THORACIC BACK PAIN: Primary | ICD-10-CM

## 2020-06-15 DIAGNOSIS — G89.29 CHRONIC MIDLINE THORACIC BACK PAIN: Primary | ICD-10-CM

## 2020-06-15 PROCEDURE — 99441 PR PHYS/QHP TELEPHONE EVALUATION 5-10 MIN: CPT | Performed by: NEUROLOGICAL SURGERY

## 2020-06-17 ENCOUNTER — OFFICE VISIT (OUTPATIENT)
Dept: SPORTS MEDICINE | Facility: CLINIC | Age: 60
End: 2020-06-17

## 2020-06-17 VITALS
BODY MASS INDEX: 36.07 KG/M2 | DIASTOLIC BLOOD PRESSURE: 70 MMHG | SYSTOLIC BLOOD PRESSURE: 110 MMHG | OXYGEN SATURATION: 97 % | HEIGHT: 68 IN | HEART RATE: 58 BPM | WEIGHT: 238 LBS

## 2020-06-17 DIAGNOSIS — Z00.00 ANNUAL PHYSICAL EXAM: Primary | ICD-10-CM

## 2020-06-17 DIAGNOSIS — S29.9XXA RIB INJURY: ICD-10-CM

## 2020-06-17 DIAGNOSIS — M13.0 POLYARTHRITIS: ICD-10-CM

## 2020-06-17 PROCEDURE — 71101 X-RAY EXAM UNILAT RIBS/CHEST: CPT | Performed by: FAMILY MEDICINE

## 2020-06-17 PROCEDURE — 99396 PREV VISIT EST AGE 40-64: CPT | Performed by: FAMILY MEDICINE

## 2020-06-17 NOTE — PROGRESS NOTES
"Familia Duke is here today for an annual physical exam.     Overall stable but concerned about 3 falls in the past 3 weeks. All 3 related to taking a step; once because dropping an object and tripping. Acute R rib pain.   Eating healthier in the past few weeks (down 10 pound in 3 weeks) after struggling initially during pandemic. Working on increasing exercise.   Had repeat biopsy in fall with Dr. JEROME Hodges    PHQ-2 Depression Screening  Little interest or pleasure in doing things? 0   Feeling down, depressed, or hopeless? 0   PHQ-2 Total Score 0        Health Maintenance   Topic Date Due   • TDAP/TD VACCINES (1 - Tdap) 10/22/1969   • ZOSTER VACCINE (1 of 2) 10/22/2008   • PT PLAN OF CARE  10/17/2019   • INFLUENZA VACCINE  08/01/2020   • LIPID PANEL  06/10/2021   • ANNUAL PHYSICAL  06/18/2021   • COLONOSCOPY  10/31/2028   • HEPATITIS C SCREENING  Completed       Review of Systems   Constitutional: Negative.    Respiratory: Negative.    Cardiovascular: Negative.        /70   Pulse 58   Ht 172.7 cm (67.99\")   Wt 108 kg (238 lb)   SpO2 97%   BMI 36.20 kg/m²      Physical Exam    Vital signs reviewed.  General appearance: No acute distress  Eyes: conjunctiva clear without erythema; pupils equally round and reactive  ENT: external ears and nose normal; hearing normal, oropharynx clear  Neck: supple; no thyromegaly  CV: normal rate and rhythm; no peripheral edema  Respiratory: normal respiratory effort; lungs clear to auscultation bilaterally  *There is tenderness to palpation on the left costochondral ridge inferiorly  MSK: normal gait and station; no focal joint deformity or swelling  Skin: no rash or wounds; normal turgor  Neuro: cranial nerves 2-12 grossly intact; normal sensation to light touch  Psych: mood and affect normal; recent and remote memory intact    Lab Requisition on 06/10/2020   Component Date Value Ref Range Status   • COVID19 06/10/2020 Not Detected  Not Detected - Ref. Range Final "   Orders Only on 06/08/2020   Component Date Value Ref Range Status   • Hemoglobin A1C 06/10/2020 6.03* 4.80 - 5.60 % Final    Comment: Hemoglobin A1C Ranges:  Increased Risk for Diabetes  5.7% to 6.4%  Diabetes                     >= 6.5%  Diabetic Goal                < 7.0%     • Glucose 06/10/2020 123* 65 - 99 mg/dL Final   • BUN 06/10/2020 11  8 - 23 mg/dL Final   • Creatinine 06/10/2020 1.15  0.76 - 1.27 mg/dL Final   • eGFR Non  Am 06/10/2020 65  >60 mL/min/1.73 Final   • eGFR African Am 06/10/2020 78  >60 mL/min/1.73 Final   • BUN/Creatinine Ratio 06/10/2020 9.6  7.0 - 25.0 Final   • Sodium 06/10/2020 141  136 - 145 mmol/L Final   • Potassium 06/10/2020 4.0  3.5 - 5.2 mmol/L Final   • Chloride 06/10/2020 108* 98 - 107 mmol/L Final   • Total CO2 06/10/2020 22.6  22.0 - 29.0 mmol/L Final   • Calcium 06/10/2020 8.9  8.6 - 10.5 mg/dL Final   • Total Protein 06/10/2020 6.8  6.0 - 8.5 g/dL Final   • Albumin 06/10/2020 4.10  3.50 - 5.20 g/dL Final   • Globulin 06/10/2020 2.7  gm/dL Final   • A/G Ratio 06/10/2020 1.5  g/dL Final   • Total Bilirubin 06/10/2020 0.4  0.2 - 1.2 mg/dL Final   • Alkaline Phosphatase 06/10/2020 79  39 - 117 U/L Final   • AST (SGOT) 06/10/2020 34  1 - 40 U/L Final   • ALT (SGPT) 06/10/2020 31  1 - 41 U/L Final   • WBC 06/10/2020 4.90  3.40 - 10.80 10*3/mm3 Final   • RBC 06/10/2020 4.36  4.14 - 5.80 10*6/mm3 Final   • Hemoglobin 06/10/2020 13.3  13.0 - 17.7 g/dL Final   • Hematocrit 06/10/2020 39.9  37.5 - 51.0 % Final   • MCV 06/10/2020 91.5  79.0 - 97.0 fL Final   • MCH 06/10/2020 30.5  26.6 - 33.0 pg Final   • MCHC 06/10/2020 33.3  31.5 - 35.7 g/dL Final   • RDW 06/10/2020 13.8  12.3 - 15.4 % Final   • Platelets 06/10/2020 133* 140 - 450 10*3/mm3 Final   • Neutrophil Rel % 06/10/2020 64.4  42.7 - 76.0 % Final   • Lymphocyte Rel % 06/10/2020 26.5  19.6 - 45.3 % Final   • Monocyte Rel % 06/10/2020 6.5  5.0 - 12.0 % Final   • Eosinophil Rel % 06/10/2020 2.2  0.3 - 6.2 % Final   •  Basophil Rel % 06/10/2020 0.2  0.0 - 1.5 % Final   • Neutrophils Absolute 06/10/2020 3.15  1.70 - 7.00 10*3/mm3 Final   • Lymphocytes Absolute 06/10/2020 1.30  0.70 - 3.10 10*3/mm3 Final   • Monocytes Absolute 06/10/2020 0.32  0.10 - 0.90 10*3/mm3 Final   • Eosinophils Absolute 06/10/2020 0.11  0.00 - 0.40 10*3/mm3 Final   • Basophils Absolute 06/10/2020 0.01  0.00 - 0.20 10*3/mm3 Final   • Immature Granulocyte Rel % 06/10/2020 0.2  0.0 - 0.5 % Final   • Immature Grans Absolute 06/10/2020 0.01  0.00 - 0.05 10*3/mm3 Final   • nRBC 06/10/2020 0.2  0.0 - 0.2 /100 WBC Final   • Total Cholesterol 06/10/2020 108  0 - 200 mg/dL Final   • Triglycerides 06/10/2020 411* 0 - 150 mg/dL Final   • HDL Cholesterol 06/10/2020 21* 40 - 60 mg/dL Final   • VLDL Cholesterol 06/10/2020 CANCELED  mg/dL Final-Edited    Comment: Test not performed  Unable to calculate    Result canceled by the ancillary.     • LDL Cholesterol  06/10/2020 CANCELED  mg/dL Final-Edited    Comment: Test not performed  Unable to calculate    Result canceled by the ancillary.     • Chol/HDL Ratio 06/10/2020 5.14   Final   • TSH 06/10/2020 3.080  0.450 - 4.500 uIU/mL Final    Comment: No apparent thyroid disorder. Additional testing not indicated. In  rare instances, Secondary Hypothyroidism as well as Subclinical  Hypothyroidism have been reported in some patients with normal TSH  values.     • Specific Gravity, UA 06/10/2020 1.021  1.005 - 1.030 Final   • pH, UA 06/10/2020 5.5  5.0 - 7.5 Final   • Color, UA 06/10/2020 Yellow  Yellow Final   • Appearance, UA 06/10/2020 Clear  Clear Final   • Leukocytes, UA 06/10/2020 Negative  Negative Final   • Protein 06/10/2020 Negative  Negative/Trace Final   • Glucose, UA 06/10/2020 Negative  Negative Final   • Ketones 06/10/2020 Negative  Negative Final   • Blood, UA 06/10/2020 Negative  Negative Final   • Bilirubin, UA 06/10/2020 Negative  Negative Final   • Urobilinogen, UA 06/10/2020 0.2  0.2 - 1.0 mg/dL Final   •  Nitrite, UA 06/10/2020 Negative  Negative Final   • Microscopic Examination 06/10/2020 Comment   Final    Microscopic follows if indicated.   • Microscopic Examination 06/10/2020 See below:   Final    Microscopic was indicated and was performed.   • Urinalysis Reflex 06/10/2020 Comment   Final    This specimen will not reflex to a Urine Culture.   • PSA 06/10/2020 7.560* 0.000 - 4.000 ng/mL Final    Results may be falsely decreased if patient taking Biotin.   • WBC, UA 06/10/2020 0-5  0 - 5 /hpf Final   • RBC, UA 06/10/2020 None seen  0 - 2 /hpf Final   • Epithelial Cells (non renal) 06/10/2020 0-10  0 - 10 /hpf Final   • Mucus, UA 06/10/2020 Present  Not Estab. /hpf Final   • Bacteria, UA 06/10/2020 None seen  None seen/Few /hpf Final      Chest X-Ray  Indication: Rib pain  Views: Right side ribs and PA chest    Findings:  Normal lung markings.  No pleural effusion.  Heart size and shape are normal.  Mediastinum is normal.  No visible rib fractures.   Overall, normal chest.    There were no previous studies available for comparison.       Current Outpatient Medications:   •  Coenzyme Q10 (CO Q 10) 10 MG capsule, Take 1 capsule by mouth Daily., Disp: , Rfl:   •  escitalopram (LEXAPRO) 20 MG tablet, Take 1 tablet by mouth Daily., Disp: 90 tablet, Rfl: 3  •  naproxen sodium (ALEVE) 220 MG tablet, Take 220 mg by mouth 2 (Two) Times a Day As Needed., Disp: , Rfl:   •  omeprazole (priLOSEC) 20 MG capsule, Take 20 mg by mouth Daily., Disp: , Rfl:   •  rosuvastatin (CRESTOR) 40 MG tablet, Take 1 tablet by mouth Daily., Disp: 90 tablet, Rfl: 3  •  tamsulosin (FLOMAX) 0.4 MG capsule 24 hr capsule, Take 1 capsule by mouth Every Night., Disp: 30 capsule, Rfl:   •  TiZANidine (ZANAFLEX) 2 MG capsule, Take 1 capsule by mouth 3 (Three) Times a Day As Needed for Muscle Spasms., Disp: 90 capsule, Rfl: 0    Familia was seen today for annual exam.    Diagnoses and all orders for this visit:    Annual physical exam    Rib injury  -      XR Ribs Right With PA Chest    Polyarthritis  -     Ambulatory Referral to Rheumatology        Encourage healthy diet and exercise.  Encourage patient to stay up to date on screening examinations as indicated based on age and risk factors.  Recheck lipids and a1c 4-6 months - note how good he was in October  Aleve/ER tylenol - if not working try mobic or celebrex  No evidence of rib fracture, discussed treatment of likely costochondral sprain  Based on his concerns of chronic progressive polyarthritis we will arrange consultation with rheumatology for possible seronegative arthritis.    EMR Dragon/Transcription disclaimer:    Much of this encounter note is an electronic transcription/translation of spoken language to printed text.  The electronic translation of spoken language may permit erroneous, or at times, nonsensical words or phrases to be inadvertently transcribed.  Although I have reviewed the note for such errors some may still exist.

## 2020-06-23 ENCOUNTER — OFFICE VISIT (OUTPATIENT)
Dept: SPORTS MEDICINE | Facility: CLINIC | Age: 60
End: 2020-06-23

## 2020-06-23 VITALS
BODY MASS INDEX: 36.07 KG/M2 | DIASTOLIC BLOOD PRESSURE: 78 MMHG | WEIGHT: 238 LBS | HEIGHT: 68 IN | SYSTOLIC BLOOD PRESSURE: 120 MMHG | OXYGEN SATURATION: 97 % | HEART RATE: 64 BPM

## 2020-06-23 DIAGNOSIS — T16.2XXA FOREIGN BODY OF LEFT EAR, INITIAL ENCOUNTER: Primary | ICD-10-CM

## 2020-06-23 PROCEDURE — 69200 CLEAR OUTER EAR CANAL: CPT | Performed by: FAMILY MEDICINE

## 2020-06-23 NOTE — PROGRESS NOTES
Here today for concern about a left ear.  He thinks the removable tip of his hearing aid detached and is lodged in the canal.    Physical exam confirms tip of the hearing aid lodged in the ear canal.  This was removed using alligator forceps.  Tolerated well.  After removal of the ear was examined, the external canal does appear to have some mild erythema consistent with superficial abrasion.  Otherwise normal.    Diagnoses and all orders for this visit:    Foreign body of left ear, initial encounter

## 2020-06-30 ENCOUNTER — OFFICE VISIT (OUTPATIENT)
Dept: PAIN MEDICINE | Facility: CLINIC | Age: 60
End: 2020-06-30

## 2020-06-30 VITALS
HEART RATE: 68 BPM | DIASTOLIC BLOOD PRESSURE: 63 MMHG | TEMPERATURE: 96.9 F | OXYGEN SATURATION: 97 % | BODY MASS INDEX: 36.4 KG/M2 | RESPIRATION RATE: 16 BRPM | SYSTOLIC BLOOD PRESSURE: 110 MMHG | HEIGHT: 68 IN | WEIGHT: 240.2 LBS

## 2020-06-30 DIAGNOSIS — M47.814 ARTHROPATHY OF THORACIC FACET JOINT: ICD-10-CM

## 2020-06-30 DIAGNOSIS — M54.6 CHRONIC MIDLINE THORACIC BACK PAIN: Primary | ICD-10-CM

## 2020-06-30 DIAGNOSIS — G89.29 CHRONIC MIDLINE THORACIC BACK PAIN: Primary | ICD-10-CM

## 2020-06-30 DIAGNOSIS — M47.814 THORACIC SPONDYLOSIS WITHOUT MYELOPATHY: ICD-10-CM

## 2020-06-30 PROCEDURE — 99213 OFFICE O/P EST LOW 20 MIN: CPT | Performed by: NURSE PRACTITIONER

## 2020-06-30 NOTE — PROGRESS NOTES
CHIEF COMPLAINT  F/U back and neck pain- Thoracic Medial Branch Blockades    Subjective   Familia Duke is a 59 y.o. male  who presents to the office for follow-up of procedure.  He completed a Bilateral T7-T10 Thoracic MBB   on  3/18/2020 performed by Dr. Springer for management of mid-back pain. Patient reports 80% relief from the procedure x 1 day and then waning benefit over the next 12 hours until his pain returned to baseline. He then completed a Bilateral T9-T11 thoracic MBB on 6/12/2020 performed by Dr. Springer for management of mid-back pain.  Patient reports 80% relief from the procedure x 1 day and then waning benefit over the 2nd day with his pain returning to baseline.  Today his pain is 5/10VAS in severity.     Back Pain   This is a chronic problem. The current episode started more than 1 year ago. The problem occurs constantly. The problem has been gradually worsening since onset. The pain is present in the thoracic spine. The quality of the pain is described as aching, burning and stabbing. The pain does not radiate. The pain is at a severity of 5/10. The symptoms are aggravated by bending, twisting, standing and position (prolonged walking). Pertinent negatives include no abdominal pain or dysuria. Risk factors include obesity, poor posture and lack of exercise. He has tried analgesics, NSAIDs, heat and home exercises (tylenol, aleve) for the symptoms. The treatment provided mild relief.      PEG Assessment   What number best describes your pain on average in the past week?6  What number best describes how, during the past week, pain has interfered with your enjoyment of life?8  What number best describes how, during the past week, pain has interfered with your general activity?  8    The following portions of the patient's history were reviewed and updated as appropriate: allergies, current medications, past family history, past medical history, past social history, past surgical history and  "problem list.    Review of Systems   Constitutional: Positive for activity change (decreased) and fatigue. Negative for chills.   HENT: Positive for hearing loss (left ear). Negative for congestion.    Eyes: Positive for visual disturbance.   Respiratory: Negative for cough, shortness of breath and wheezing.    Cardiovascular: Negative.    Gastrointestinal: Negative for abdominal pain, constipation and diarrhea.   Genitourinary: Positive for difficulty urinating. Negative for dysuria.   Musculoskeletal: Positive for back pain.   Neurological: Negative for dizziness and light-headedness.   Psychiatric/Behavioral: Positive for agitation and sleep disturbance. Negative for suicidal ideas. The patient is not nervous/anxious.      Vitals:    06/30/20 1335   BP: 110/63   Pulse: 68   Resp: 16   Temp: 96.9 °F (36.1 °C)   SpO2: 97%   Weight: 109 kg (240 lb 3.2 oz)   Height: 172.7 cm (68\")   PainSc:   5   PainLoc: Back     Objective   Physical Exam   Constitutional: He is oriented to person, place, and time. Vital signs are normal. He appears well-developed and well-nourished.   HENT:   Head: Normocephalic and atraumatic.   Eyes: Conjunctivae, EOM and lids are normal.   Neck: Trachea normal and normal range of motion. Neck supple.   Cardiovascular: Normal rate.   Pulmonary/Chest: Effort normal.   Abdominal: Normal appearance.   Musculoskeletal:        Thoracic back: He exhibits decreased range of motion, tenderness and pain.   Neurological: He is alert and oriented to person, place, and time.   Skin: Skin is warm, dry and intact.   Psychiatric: He has a normal mood and affect. His speech is normal and behavior is normal. Judgment normal.   Nursing note and vitals reviewed.    Assessment/Plan   Familia was seen today for back pain.    Diagnoses and all orders for this visit:    Chronic midline thoracic back pain  -     Case Request    Arthropathy of thoracic facet joint  -     Case Request    Thoracic spondylosis without " myelopathy      --- Proceed with Bilateral T9-T11 RFL  Reviewed the procedure at length with the patient.  Included in the review was expectations, complications, risk and benefits.The procedure was described in detail and the risks, benefits and alternatives were discussed with the patient (including but not limited to: bleeding, infection, nerve damage, worsening of pain, inability to perform injection, paralysis, seizures, and death) who agreed to proceed.  Discussed the potential for sedation if warranted/wanted.  The procedure will plan to be performed at [facility] with fluoroscopic guidance(unless ultrasound is indicated) and could potentially have steroids and contrast dye used. Questions were answered and in a way the patient could understand.  Patient verbalized understanding and wishes to proceed.  This intervention will be ordered.  Discussed with patient that all procedures are part of a multimodal plan of care and include either formal PT or a home exercise program.  Patient has no evidence of coagulopathy or current infection.  --------  Education about Radiofrequency Lesioning of Medial Branches:    The medial branch blockade was intended for diagnostic purposes, with the intent of offering the patient Radiofrequency thermal rhizotomy if the MBB is diagnostically effective.  The diagnostic blockade is necessary to determine the likelihood that RF therapy could be efficacious in providing long term relief to the patient.  As indicated above, diagnostic efficacy was established.      In the RF procedure, needles are placed to the joint lines in the same fashion, and after testing, the needle tips are heated to thermally treat the nerves, blocking the nerves by in essence damaging the nerves with the heat treatment.      Medically, a successful RF procedure should provide a patient with 50% pain relief or more for at least 6 months.  We estimate a likelihood of about an 80% chance  that medical success will be realized.  We discussed & educated once again that not all patients have a medically successful result, and the patient voices understanding.  However, our clinical experience suggests that successful patients receive relief more in the range of 12 months on average.  (We also discussed that a fortunate minority of patients receive therapeutic success from the MBB, and may not require RF ablation.  If a patient receives more than 8 weeks of relief from MBB, then occasional repeat MBB for therapeutic purposes is a very reasonable alternative therapy.  This course of therapy is consistent with our LCDs according to our CMS  in the area, and therefore other insurance providers should follow accordingly.  We will monitor our patients to screen for these therapeutic responders and will offer RF therapy only when necessary.  However, in this clinical scenario, this therapeutic result was not realized, and therefore Radiofrequency Lesioning is medically necessary.)      We discussed that MBB & RF are not without risks.  Guidelines regarding anticoagulant use & neuraxial procedures will be respected.  Patients that are ill or otherwise may be at risk for sepsis will not have their spines accessed by neuraxial injections of any type.  This patient will not be offered these therapies if there is an increased risk.   We discussed that there is a risk of postprocedural pain and also a risk of worsening of clinical picture with these procedures as with any neuraxial procedure.  All invasive procedures have risks including but not limited to bleeding, infection, injury, nerve injury, paralysis, coma, death, lack of pain relief, and worsening of clinical picture.      In this education session, all of these topics were covered and the patient voiced understanding.    ---------  --- Follow-up after procedure     MILO REPORT    MILO report has been reviewed and scanned into the patient's  chart.    As the clinician, I personally reviewed the MILO from 6/29/2020 while the patient was in the office today.    EMR Dragon/Transcription disclaimer:   Much of this encounter note is an electronic transcription/translation of spoken language to printed text. The electronic translation of spoken language may permit erroneous, or at times, nonsensical words or phrases to be inadvertently transcribed; Although I have reviewed the note for such errors, some may still exist.

## 2020-07-27 ENCOUNTER — LAB REQUISITION (OUTPATIENT)
Dept: LAB | Facility: HOSPITAL | Age: 60
End: 2020-07-27

## 2020-07-27 DIAGNOSIS — Z00.00 ENCOUNTER FOR GENERAL ADULT MEDICAL EXAMINATION WITHOUT ABNORMAL FINDINGS: ICD-10-CM

## 2020-07-28 PROCEDURE — U0004 COV-19 TEST NON-CDC HGH THRU: HCPCS | Performed by: ANESTHESIOLOGY

## 2020-07-29 LAB
REF LAB TEST METHOD: NORMAL
SARS-COV-2 RNA RESP QL NAA+PROBE: NOT DETECTED

## 2020-07-30 ENCOUNTER — DOCUMENTATION (OUTPATIENT)
Dept: PAIN MEDICINE | Facility: CLINIC | Age: 60
End: 2020-07-30

## 2020-07-30 ENCOUNTER — OUTSIDE FACILITY SERVICE (OUTPATIENT)
Dept: PAIN MEDICINE | Facility: CLINIC | Age: 60
End: 2020-07-30

## 2020-07-30 PROCEDURE — 64634 DESTROY C/TH FACET JNT ADDL: CPT | Performed by: ANESTHESIOLOGY

## 2020-07-30 PROCEDURE — 99152 MOD SED SAME PHYS/QHP 5/>YRS: CPT | Performed by: ANESTHESIOLOGY

## 2020-07-30 PROCEDURE — 64633 DESTROY CERV/THOR FACET JNT: CPT | Performed by: ANESTHESIOLOGY

## 2020-07-30 NOTE — PROGRESS NOTES
Thoracic Medial Branch Radiofrequency Lesioning:  [facility]        PREOPERATIVE DIAGNOSIS:  Thoracic spondylosis without myelopathy    POSTOPERATIVE DIAGNOSIS:  Same as above.  Thoracic spine pain and thoracic facet arthropathy.     PROCEDURE:   Diagnostic Thoracic Medial Branch RF (thermal), with fluoroscopy:     - LEVELS:  left  T9, T10 and T11    PRE-PROCEDURE DISCUSSION WITH PATIENT:    Risks and complications were discussed with the patient prior to starting the procedure and informed consent was obtained.   We discussed various topics including but not limited to bleeding, infection, injury, nerve injury, paralysis, coma, death, pneumothorax, worsening of clinical picture, postprocedural soreness, and lack of pain relief.  We discussed the diagnostic nature of this procedure as well as the potential for possible long term therapeutic relief, and the odds of such.    SURGEON:  Javon Springer MD    REASON FOR PROCEDURE:    Previous dx+ TMBB, recurring left thoracic spine pain    SEDATION:  Versed 4mg & Fentanyl 50 mcg IV  TIME OF PROCEDURE:  Intraoperative procedure time, after administration of the IV sedation, was 20 minutes.    ANESTHETIC:  Marcaine 0.5%  STEROID:  NONE  TOTAL VOLUME OF SOLUTION:  3ml    DESCRIPTON OF PROCEDURE:  After obtaining informed consent, IV access was  obtained in the preoperative area.   The patient was taken to the operating room.  The patient was placed in the prone position with a pillow under the abdomen. All pressure points were well padded.  EKG, blood pressure, and pulse oximeter were monitored.  The patient was monitored and sedated by the RN under my direction. The thoracic area was prepped with Chloraprep and draped in a sterile fashion.     Under fluoroscopic guidance at each of the levels above the approach to address the transverse processes of those vertebrae at the junctions of the superior articular processes was identified.  Skin and  subcutaneous tissues were anesthetized with 1% lidocaine above each of these points.     Then, radiofrequency probe needles were advanced in this fluoro view to the above junctions, advancing under serial fluoroscopic images to ensure that the needle tip was overlying the transverse process throughout the course of each needle.  Then a lateral image was taken for each needle position to ensure proper depth and that the needles were near the joint.  Aspiration was negative for blood and CSF.  After confirming the position of the needle with fluoroscope in all views,testing was initiated.  First, sensory testing was started on each needle a 1V and 50Hz and slowly decreased until painful pressure stimulation diminished at 0.5V.  Next, motor testing was confirmed to be negative at 3V and 2Hz for any radicular stimulation.  Then 1mL of the local anesthetic was instilled in each needle.  Two minutes elapsed, and during this time a lateral fluoroscopic view was confirmed again to ensure the needles had not advanced nor retracted.  Then, Radiofrequency Lesioning was initiated for 2.5 minutes at 85 degrees Celsius.  Needles were removed intact from each of the areas.      Onset of analgesia was noted.  Vital signs remained stable throughout.        ESTIMATED BLOOD LOSS:  <5 mL  SPECIMENS:  none    COMPLICATIONS:   No complications were noted., There was no indication of vascular uptake on live injection of contrast dye., There was no indication of intrathecal uptake on live injection of contrast dye., There was not any evidence of dural puncture.   and The patient did not have any signs of postprocedure numbness nor weakness.    TOLERANCE & DISCHARGE CONDITION:    The patient tolerated the procedure well.  The patient was transported to the recovery area without difficulties.  The patient was discharged to home under the care of family in stable and satisfactory condition.    PLAN OF CARE:  1. The patient was given our  standard instruction sheet.  2. We discussed that there may be some soreness and painful flare after RF procedure, and that optimum pain relief may not be immediately realized.    3. The patient will  Return to clinic 6 wks  4. The patient will resume all medications as per the medication reconciliation sheet.

## 2020-09-11 ENCOUNTER — OFFICE VISIT (OUTPATIENT)
Dept: PAIN MEDICINE | Facility: CLINIC | Age: 60
End: 2020-09-11

## 2020-09-11 VITALS
HEIGHT: 68 IN | BODY MASS INDEX: 35.16 KG/M2 | OXYGEN SATURATION: 98 % | WEIGHT: 232 LBS | DIASTOLIC BLOOD PRESSURE: 73 MMHG | TEMPERATURE: 97.5 F | RESPIRATION RATE: 20 BRPM | SYSTOLIC BLOOD PRESSURE: 124 MMHG | HEART RATE: 69 BPM

## 2020-09-11 DIAGNOSIS — M54.12 CERVICAL RADICULOPATHY: ICD-10-CM

## 2020-09-11 DIAGNOSIS — M54.6 CHRONIC MIDLINE THORACIC BACK PAIN: ICD-10-CM

## 2020-09-11 DIAGNOSIS — G89.29 CHRONIC NECK PAIN: ICD-10-CM

## 2020-09-11 DIAGNOSIS — M54.2 CHRONIC NECK PAIN: ICD-10-CM

## 2020-09-11 DIAGNOSIS — M54.6 THORACIC SPINE PAIN: Primary | ICD-10-CM

## 2020-09-11 DIAGNOSIS — G89.29 CHRONIC MIDLINE THORACIC BACK PAIN: ICD-10-CM

## 2020-09-11 DIAGNOSIS — M47.814 FACET ARTHROPATHY, THORACIC: ICD-10-CM

## 2020-09-11 PROCEDURE — 99212 OFFICE O/P EST SF 10 MIN: CPT | Performed by: NURSE PRACTITIONER

## 2020-09-11 RX ORDER — ESCITALOPRAM OXALATE 20 MG/1
1 TABLET ORAL
COMMUNITY
End: 2021-01-08 | Stop reason: SDUPTHER

## 2020-09-11 RX ORDER — SENNOSIDES 8.6 MG
2 CAPSULE ORAL EVERY 8 HOURS
COMMUNITY

## 2020-09-11 RX ORDER — ROSUVASTATIN CALCIUM 40 MG/1
1 TABLET, COATED ORAL
COMMUNITY
End: 2021-01-08 | Stop reason: SDUPTHER

## 2020-09-11 RX ORDER — TAMSULOSIN HYDROCHLORIDE 0.4 MG/1
1 CAPSULE ORAL 2 TIMES DAILY
Status: ON HOLD | COMMUNITY
End: 2021-12-13

## 2020-09-11 NOTE — PROGRESS NOTES
CHIEF COMPLAINT  F/u back and neck pain. Pt had Thoracic Medial Branch Radiofrequency Lesioning     Subjective   Familia Duke is a 59 y.o. male  who presents for follow-up.  He has a history of mid-back pain.  He completed a Left T9-T11 Radiofrequency ablation on 7/30/2020 performed by Dr. Springer.  He reports 95% ONGOING relief from this procedure.     Today his primary complaint is neck pain.  His neck pain today is 6/10VAS in severity. He describes his neck pain as continuous aching, throbbing pain with intermittent sharp, shooting pain that is focused on the right side of his neck. He states that he also experiences numbness and burning pain in his bilateral hands, especially when using his riding . His neck pain is aggravated by prolonged standing, his pain is improved with rest. Telehealth note reviewed from 6/15/2020 with Dr. Velasquez.  Discussed C7-T1 having a small disc herniation on the right as well as the patient's bilateral hand numbness and tingling.  At that time the patient was instructed to follow-up in 4 months, but to be seen sooner if his hand numbness and tingling worsened.  Today Mr. Suggs states that his symptoms in his bilateral hands have worsened since his visit with Dr. Velasquez.  We discussed that he needs to follow-up with Dr. Velasquez regarding this per instructions from his last visit.  Patient states understanding    Back Pain   This is a chronic problem. The current episode started more than 1 year ago. The problem occurs constantly. The problem has been gradually worsening since onset. The pain is present in the thoracic spine. The quality of the pain is described as aching, burning and stabbing. The pain does not radiate. The pain is at a severity of 1/10. The symptoms are aggravated by bending, twisting, standing and position (prolonged walking). Pertinent negatives include no abdominal pain, chest pain, dysuria, fever, headaches, numbness or weakness. Risk  "factors include obesity, poor posture and lack of exercise. He has tried analgesics, NSAIDs, heat and home exercises (tylenol, aleve) for the symptoms. The treatment provided significant (RFL) relief.      PEG Assessment   What number best describes your pain on average in the past week?1  What number best describes how, during the past week, pain has interfered with your enjoyment of life?1  What number best describes how, during the past week, pain has interfered with your general activity?  1    The following portions of the patient's history were reviewed and updated as appropriate: allergies, current medications, past family history, past medical history, past social history, past surgical history and problem list.    Review of Systems   Constitutional: Positive for activity change (improved). Negative for fatigue and fever.   HENT: Negative for congestion.    Eyes: Negative for visual disturbance.   Respiratory: Negative for cough and shortness of breath.    Cardiovascular: Negative for chest pain.   Gastrointestinal: Negative for abdominal pain, constipation and diarrhea.   Genitourinary: Negative for difficulty urinating and dysuria.   Musculoskeletal: Positive for back pain. Negative for arthralgias.   Allergic/Immunologic: Negative for immunocompromised state.   Neurological: Negative for dizziness, weakness, light-headedness, numbness and headaches.   Psychiatric/Behavioral: Positive for sleep disturbance. Negative for agitation and suicidal ideas. The patient is not nervous/anxious.      --  The aforementioned information the Chief Complaint section and above subjective data including any HPI data, and also the Review of Systems data, has been personally reviewed and affirmed.  --    Vitals:    09/11/20 0808   BP: 124/73   Pulse: 69   Resp: 20   Temp: 97.5 °F (36.4 °C)   SpO2: 98%   Weight: 105 kg (232 lb)   Height: 172.7 cm (68\")   PainSc:   1   PainLoc: Back  Comment: and neck     Objective   Physical " Exam   Constitutional: He is oriented to person, place, and time. Vital signs are normal. He appears well-developed and well-nourished.   HENT:   Head: Normocephalic and atraumatic.   Eyes: Conjunctivae, EOM and lids are normal.   Neck: Trachea normal and normal range of motion.   Cardiovascular: Normal rate.   Pulmonary/Chest: Effort normal.   Abdominal: Normal appearance.   Musculoskeletal:        Cervical back: He exhibits decreased range of motion.        Lumbar back: He exhibits decreased range of motion.   Neurological: He is alert and oriented to person, place, and time.   Skin: Skin is warm, dry and intact.   Psychiatric: He has a normal mood and affect. His speech is normal and behavior is normal. Judgment normal.   Nursing note and vitals reviewed.    Assessment/Plan   Familia was seen today for back pain and neck pain.    Diagnoses and all orders for this visit:    Thoracic spine pain    Facet arthropathy, thoracic    Cervical radiculopathy    Chronic neck pain    Chronic midline thoracic back pain      --- F/u with Dr. Velasquez regarding increasing hand numbness   --- Discussed that the RFL can be completed every 6 months as needed for pain.   --- Consider MARITO vs. Cervical MBB and possible RFL after f/u with neurosurgery.   --- Follow-up as pain returns or worsens     MILO REPORT    MILO report has been reviewed and scanned into the patient's chart.    As the clinician, I personally reviewed the MILO from 9/10/2020 while the patient was in the office today.    EMR Dragon/Transcription disclaimer:   Much of this encounter note is an electronic transcription/translation of spoken language to printed text. The electronic translation of spoken language may permit erroneous, or at times, nonsensical words or phrases to be inadvertently transcribed; Although I have reviewed the note for such errors, some may still exist.

## 2020-10-08 NOTE — PROGRESS NOTES
Subjective   History of Present Illness: Familia Duke is a 59 y.o. male is here today for follow-up on neck and back pain.    He was seen last 6/15/20 and was advised to continue working with Dr. Springer with injection therapies.     He has MBB/RFA in July which did help with the pain in midback.      Today, he returns to the office with concerns about numbness in both hands. He takes Tylenol 650 mg and Aleve 220 mg prn for pain.     I had done a lumbar decompression on him approximately a year ago and he is done well with that.  He said chronic thoracic back pain and the ablation by Dr. Springer seems to have helped him.  But over the last few months he has developed some burning and numbness and tingling in his hands.  He has a positive Tinel's sign on the right over the median nerve and over the ulnar nerve.  But the way he describes his symptoms is suggestive of ulnar compression as well as a median nerve compression.  I do not think this is a neck problem.  He is still working and uses his hands quite a bit.  We will send him to see Dr. Guido and also get an EMG and nerve conduction study.  He has some triggering of his fingers on the right as well.  We will do a televisit with him in 4 months to see how he is doing.        Hand Pain   Quality: numbness only  The pain radiates to the left arm and right arm. The pain has been intermittent since the incident. Associated symptoms include numbness.   Arm Pain   Associated symptoms include numbness.       The following portions of the patient's history were reviewed and updated as appropriate: allergies, current medications, past family history, past medical history, past social history, past surgical history and problem list.    Review of Systems   Constitutional: Negative for fever.   Neurological: Positive for numbness.   All other systems reviewed and are negative.          Objective     Vitals:    10/21/20 1333   BP: 118/68   Pulse: 65   Temp: 98.4 °F  "(36.9 °C)   Weight: 105 kg (232 lb)   Height: 172.7 cm (68\")     Body mass index is 35.28 kg/m².      Physical Exam  Constitutional:       Appearance: He is well-developed.   HENT:      Head: Normocephalic and atraumatic.   Eyes:      Extraocular Movements: EOM normal.      Conjunctiva/sclera: Conjunctivae normal.      Pupils: Pupils are equal, round, and reactive to light.      Funduscopic exam:     Right eye: No papilledema.         Left eye: No papilledema.   Neck:      Vascular: No carotid bruit.   Neurological:      Mental Status: He is oriented to person, place, and time.      Coordination: Finger-Nose-Finger Test and Heel to Shin Test normal.      Gait: Gait is intact.      Deep Tendon Reflexes:      Reflex Scores:       Tricep reflexes are 2+ on the right side and 2+ on the left side.       Bicep reflexes are 2+ on the right side and 2+ on the left side.       Brachioradialis reflexes are 2+ on the right side and 2+ on the left side.       Patellar reflexes are 2+ on the right side and 2+ on the left side.       Achilles reflexes are 2+ on the right side and 2+ on the left side.  Psychiatric:         Speech: Speech normal.       Neurologic Exam     Mental Status   Oriented to person, place, and time.   Registration of memory: Good recent and remote memory.   Attention: normal. Concentration: normal.   Speech: speech is normal   Level of consciousness: alert  Knowledge: consistent with education.     Cranial Nerves     CN II   Visual fields full to confrontation.   Visual acuity: normal    CN III, IV, VI   Pupils are equal, round, and reactive to light.  Extraocular motions are normal.     CN V   Facial sensation intact.   Right corneal reflex: normal  Left corneal reflex: normal    CN VII   Facial expression full, symmetric.   Right facial weakness: none  Left facial weakness: none    CN VIII   Hearing: intact    CN IX, X   Palate: symmetric    CN XI   Right sternocleidomastoid strength: normal  Left " sternocleidomastoid strength: normal    CN XII   Tongue: not atrophic  Tongue deviation: none    Motor Exam   Muscle bulk: normal  Right arm tone: normal  Left arm tone: normal  Right leg tone: normal  Left leg tone: normal    Strength   Strength 5/5 except as noted.     Sensory Exam   Light touch normal.     Gait, Coordination, and Reflexes     Gait  Gait: normal    Coordination   Finger to nose coordination: normal  Heel to shin coordination: normal    Reflexes   Right brachioradialis: 2+  Left brachioradialis: 2+  Right biceps: 2+  Left biceps: 2+  Right triceps: 2+  Left triceps: 2+  Right patellar: 2+  Left patellar: 2+  Right achilles: 2+  Left achilles: 2+  Right : 2+  Left : 2+          Assessment/Plan   Independent Review of Radiographic Studies:      I personally reviewed the images from the following studies.  I reviewed the recently completed cervical and thoracic myelogram.  There is quite a bit of facet disease a bit of kyphosis and disc degeneration.  There may be an element of Scheurmans disease in the thoracic spine.  He does have a right C7-T1 small disc herniation and quite a bit of facet disease and disc space collapse in the neck.  Agree with the report.      Medical Decision Making:      We will get an EMG and nerve conduction study of both upper extremities and send her to Dr. Guido evaluation of the carpal tunnel syndrome, trigger finger, and cubital tunnel syndrome.  We will do a televisit with him in 4 months.      Diagnoses and all orders for this visit:    1. Bilateral carpal tunnel syndrome (Primary)  -     EMG both arms; Future  -     Nerve Conduction Test both arms; Future  -     Ambulatory Referral to Hand Surgery    2. Cubital tunnel syndrome, bilateral  -     EMG both arms; Future  -     Nerve Conduction Test both arms; Future  -     Ambulatory Referral to Hand Surgery    3. Pain in thoracic spine      Return in about 4 months (around 2/21/2021) for As a televisit  specifically with me.

## 2020-10-21 ENCOUNTER — OFFICE VISIT (OUTPATIENT)
Dept: NEUROSURGERY | Facility: CLINIC | Age: 60
End: 2020-10-21

## 2020-10-21 VITALS
HEIGHT: 68 IN | TEMPERATURE: 98.4 F | HEART RATE: 65 BPM | BODY MASS INDEX: 35.16 KG/M2 | WEIGHT: 232 LBS | DIASTOLIC BLOOD PRESSURE: 68 MMHG | SYSTOLIC BLOOD PRESSURE: 118 MMHG

## 2020-10-21 DIAGNOSIS — M54.6 PAIN IN THORACIC SPINE: ICD-10-CM

## 2020-10-21 DIAGNOSIS — G56.03 BILATERAL CARPAL TUNNEL SYNDROME: Primary | ICD-10-CM

## 2020-10-21 DIAGNOSIS — G56.23 CUBITAL TUNNEL SYNDROME, BILATERAL: ICD-10-CM

## 2020-10-21 PROCEDURE — 99214 OFFICE O/P EST MOD 30 MIN: CPT | Performed by: NEUROLOGICAL SURGERY

## 2020-12-03 ENCOUNTER — OFFICE VISIT (OUTPATIENT)
Dept: SPORTS MEDICINE | Facility: CLINIC | Age: 60
End: 2020-12-03

## 2020-12-03 ENCOUNTER — LAB (OUTPATIENT)
Dept: LAB | Facility: HOSPITAL | Age: 60
End: 2020-12-03

## 2020-12-03 VITALS
DIASTOLIC BLOOD PRESSURE: 76 MMHG | HEART RATE: 68 BPM | OXYGEN SATURATION: 98 % | WEIGHT: 232 LBS | HEIGHT: 68 IN | SYSTOLIC BLOOD PRESSURE: 120 MMHG | BODY MASS INDEX: 35.16 KG/M2

## 2020-12-03 DIAGNOSIS — R73.09 ELEVATED GLUCOSE LEVEL: ICD-10-CM

## 2020-12-03 DIAGNOSIS — E78.2 MIXED HYPERLIPIDEMIA: Primary | ICD-10-CM

## 2020-12-03 DIAGNOSIS — Z51.81 THERAPEUTIC DRUG MONITORING: Primary | ICD-10-CM

## 2020-12-03 DIAGNOSIS — N18.2 STAGE 2 CHRONIC KIDNEY DISEASE: ICD-10-CM

## 2020-12-03 LAB
ALBUMIN SERPL-MCNC: 4.9 G/DL (ref 3.5–5.2)
ALBUMIN/GLOB SERPL: 1.9 G/DL
ALP SERPL-CCNC: 81 U/L (ref 39–117)
ALT SERPL W P-5'-P-CCNC: 25 U/L (ref 1–41)
ANION GAP SERPL CALCULATED.3IONS-SCNC: 5.4 MMOL/L (ref 5–15)
AST SERPL-CCNC: 22 U/L (ref 1–40)
BILIRUB SERPL-MCNC: 0.6 MG/DL (ref 0–1.2)
BUN SERPL-MCNC: 17 MG/DL (ref 8–23)
BUN/CREAT SERPL: 15.7 (ref 7–25)
CALCIUM SPEC-SCNC: 9.7 MG/DL (ref 8.6–10.5)
CHLORIDE SERPL-SCNC: 105 MMOL/L (ref 98–107)
CHOLEST SERPL-MCNC: 118 MG/DL (ref 0–200)
CO2 SERPL-SCNC: 29.6 MMOL/L (ref 22–29)
CREAT SERPL-MCNC: 1.08 MG/DL (ref 0.76–1.27)
GFR SERPL CREATININE-BSD FRML MDRD: 69 ML/MIN/1.73
GLOBULIN UR ELPH-MCNC: 2.6 GM/DL
GLUCOSE SERPL-MCNC: 110 MG/DL (ref 65–99)
HBA1C MFR BLD: 5.89 % (ref 4.8–5.6)
HDLC SERPL-MCNC: 23 MG/DL (ref 40–60)
LDLC SERPL CALC-MCNC: 36 MG/DL (ref 0–100)
LDLC/HDLC SERPL: 0.61 {RATIO}
POTASSIUM SERPL-SCNC: 4.3 MMOL/L (ref 3.5–5.2)
PROT SERPL-MCNC: 7.5 G/DL (ref 6–8.5)
SODIUM SERPL-SCNC: 140 MMOL/L (ref 136–145)
TRIGL SERPL-MCNC: 405 MG/DL (ref 0–150)
VLDLC SERPL-MCNC: 59 MG/DL (ref 5–40)

## 2020-12-03 PROCEDURE — 93000 ELECTROCARDIOGRAM COMPLETE: CPT | Performed by: FAMILY MEDICINE

## 2020-12-03 PROCEDURE — 80061 LIPID PANEL: CPT | Performed by: FAMILY MEDICINE

## 2020-12-03 PROCEDURE — 99213 OFFICE O/P EST LOW 20 MIN: CPT | Performed by: FAMILY MEDICINE

## 2020-12-03 PROCEDURE — 36415 COLL VENOUS BLD VENIPUNCTURE: CPT | Performed by: FAMILY MEDICINE

## 2020-12-03 PROCEDURE — 80053 COMPREHEN METABOLIC PANEL: CPT | Performed by: FAMILY MEDICINE

## 2020-12-03 PROCEDURE — 83036 HEMOGLOBIN GLYCOSYLATED A1C: CPT | Performed by: FAMILY MEDICINE

## 2020-12-03 RX ORDER — HYDROXYCHLOROQUINE SULFATE 200 MG/1
200 TABLET, FILM COATED ORAL DAILY
COMMUNITY

## 2020-12-03 NOTE — PROGRESS NOTES
"Familia is a 60 y.o. year old male evaluation of a problem that is new to this examiner.    Chief Complaint   Patient presents with   • Abnormal ECG     would like to get an EKG done for a medication refill that he needs       History of Present Illness   HPI   Here today for evaluation with ECG regarding current use of Lexapro and potential addition of hydroxychloroquine by rheumatology.  He continues to have severe joint pains of questionable etiology.  His chronic depression and anxiety symptoms have been well controlled with Lexapro for many years.  Denies any cardiac symptoms.    I have reviewed the patient's medical, family, and social history in detail and updated the computerized patient record.    Review of Systems   Constitutional: Negative.    Respiratory: Negative.    Cardiovascular: Negative.        /76 (BP Location: Left arm, Patient Position: Sitting, Cuff Size: Adult)   Pulse 68   Ht 172.7 cm (67.99\")   Wt 105 kg (232 lb)   SpO2 98%   BMI 35.28 kg/m²    Pain Score    12/03/20 1140   PainSc:   4   PainLoc: Back  Comment: upper back           Physical Exam  Vitals signs reviewed.   Constitutional:       Appearance: Normal appearance.   Neurological:      Mental Status: He is alert.   Psychiatric:         Mood and Affect: Mood normal.           Current Outpatient Medications:   •  acetaminophen (TYLENOL) 650 MG 8 hr tablet, Take 2 tablets by mouth Every 8 (Eight) Hours., Disp: , Rfl:   •  Coenzyme Q10 (CO Q 10) 10 MG capsule, Take 1 capsule by mouth Daily., Disp: , Rfl:   •  escitalopram (LEXAPRO) 20 MG tablet, Take 1 tablet by mouth Daily., Disp: 90 tablet, Rfl: 3  •  escitalopram (LEXAPRO) 20 MG tablet, Take 1 tablet by mouth., Disp: , Rfl:   •  hydroxychloroquine (PLAQUENIL) 200 MG tablet, Take 200 mg by mouth Daily., Disp: , Rfl:   •  naproxen sodium (ALEVE) 220 MG tablet, Take 220 mg by mouth 2 (Two) Times a Day As Needed., Disp: , Rfl:   •  omeprazole (priLOSEC) 20 MG capsule, Take 20 " mg by mouth Daily., Disp: , Rfl:   •  rosuvastatin (CRESTOR) 40 MG tablet, Take 1 tablet by mouth Daily., Disp: 90 tablet, Rfl: 3  •  rosuvastatin (CRESTOR) 40 MG tablet, Take 1 tablet by mouth., Disp: , Rfl:   •  tamsulosin (FLOMAX) 0.4 MG capsule 24 hr capsule, Take 1 capsule by mouth Every Night., Disp: 30 capsule, Rfl:   •  tamsulosin (FLOMAX) 0.4 MG capsule 24 hr capsule, Take 1 capsule by mouth., Disp: , Rfl:   •  TiZANidine (ZANAFLEX) 2 MG capsule, Take 1 capsule by mouth 3 (Three) Times a Day As Needed for Muscle Spasms., Disp: 90 capsule, Rfl: 0       ECG 12 Lead    Date/Time: 12/3/2020 12:05 PM  Performed by: Khurram Kunz MD  Authorized by: Khurram Kunz MD   Previous ECG: no previous ECG available  Rhythm: sinus rhythm  Rate: normal  Conduction: conduction normal  QRS axis: normal  Other findings comments: QTc 444, borderline normal    Clinical impression: normal ECG  Clinical impression comment: with long normal QT              Diagnoses and all orders for this visit:    Therapeutic drug monitoring  -     ECG 12 Lead    Other orders  -     hydroxychloroquine (PLAQUENIL) 200 MG tablet; Take 200 mg by mouth Daily.    Reviewed his EKG and discussed considerations.  We will send her to rheumatology as well.  If they choose to start hydroxychloroquine recommend repeat EKG within a few weeks and careful monitoring.  Alternatively we could consider changing his Lexapro to another agent.    EMR Dragon/Transcription disclaimer:    Much of this encounter note is an electronic transcription/translation of spoken language to printed text.  The electronic translation of spoken language may permit erroneous, or at times, nonsensical words or phrases to be inadvertently transcribed.  Although I have reviewed the note for such errors some may still exist.

## 2020-12-30 ENCOUNTER — OFFICE VISIT (OUTPATIENT)
Dept: SPORTS MEDICINE | Facility: CLINIC | Age: 60
End: 2020-12-30

## 2020-12-30 VITALS
HEIGHT: 68 IN | WEIGHT: 230 LBS | BODY MASS INDEX: 34.86 KG/M2 | RESPIRATION RATE: 16 BRPM | SYSTOLIC BLOOD PRESSURE: 118 MMHG | OXYGEN SATURATION: 98 % | TEMPERATURE: 96.8 F | HEART RATE: 76 BPM | DIASTOLIC BLOOD PRESSURE: 70 MMHG

## 2020-12-30 DIAGNOSIS — R73.03 PREDIABETES: ICD-10-CM

## 2020-12-30 DIAGNOSIS — E78.2 MIXED HYPERLIPIDEMIA: Primary | ICD-10-CM

## 2020-12-30 DIAGNOSIS — N18.2 STAGE 2 CHRONIC KIDNEY DISEASE: ICD-10-CM

## 2020-12-30 PROCEDURE — 99214 OFFICE O/P EST MOD 30 MIN: CPT | Performed by: FAMILY MEDICINE

## 2020-12-30 NOTE — PROGRESS NOTES
"Familia is a 60 y.o. year old male follow up on a problem familiar to this examiner.     Chief Complaint   Patient presents with   • Follow-up     multiple issues       History of Present Illness   HPI   Here to follow-up on hyperlipidemia, prediabetes, and stage II kidney disease.  He is doing well overall.  He is happy with his recovery from his recent carpal tunnel surgery.  Continues current medications.  Notes that his diet has not been as well controlled recently.      I have reviewed the patient's medical, family, and social history in detail and updated the computerized patient record.    Review of Systems   Constitutional: Negative.    Respiratory: Negative.        /70 (BP Location: Left arm, Patient Position: Sitting, Cuff Size: Adult)   Pulse 76   Temp 96.8 °F (36 °C)   Resp 16   Ht 172.7 cm (68\")   Wt 104 kg (230 lb)   SpO2 98%   BMI 34.97 kg/m²    There were no vitals filed for this visit.       Physical Exam  Vitals signs and nursing note reviewed.   Constitutional:       Appearance: He is well-developed.   HENT:      Head: Normocephalic and atraumatic.   Eyes:      Conjunctiva/sclera: Conjunctivae normal.      Pupils: Pupils are equal, round, and reactive to light.   Neck:      Musculoskeletal: Normal range of motion.   Cardiovascular:      Rate and Rhythm: Normal rate and regular rhythm.      Heart sounds: Normal heart sounds.   Pulmonary:      Effort: Pulmonary effort is normal.      Breath sounds: Normal breath sounds.   Skin:     General: Skin is warm and dry.   Neurological:      Mental Status: He is alert and oriented to person, place, and time.   Psychiatric:         Judgment: Judgment normal.           Current Outpatient Medications:   •  acetaminophen (TYLENOL) 650 MG 8 hr tablet, Take 2 tablets by mouth Every 8 (Eight) Hours., Disp: , Rfl:   •  Coenzyme Q10 (CO Q 10) 10 MG capsule, Take 1 capsule by mouth Daily., Disp: , Rfl:   •  escitalopram (LEXAPRO) 20 MG tablet, Take 1 " tablet by mouth Daily., Disp: 90 tablet, Rfl: 3  •  escitalopram (LEXAPRO) 20 MG tablet, Take 1 tablet by mouth., Disp: , Rfl:   •  hydroxychloroquine (PLAQUENIL) 200 MG tablet, Take 200 mg by mouth Daily., Disp: , Rfl:   •  naproxen sodium (ALEVE) 220 MG tablet, Take 220 mg by mouth 2 (Two) Times a Day As Needed., Disp: , Rfl:   •  omeprazole (priLOSEC) 20 MG capsule, Take 20 mg by mouth Daily., Disp: , Rfl:   •  rosuvastatin (CRESTOR) 40 MG tablet, Take 1 tablet by mouth Daily., Disp: 90 tablet, Rfl: 3  •  rosuvastatin (CRESTOR) 40 MG tablet, Take 1 tablet by mouth., Disp: , Rfl:   •  tamsulosin (FLOMAX) 0.4 MG capsule 24 hr capsule, Take 1 capsule by mouth Every Night., Disp: 30 capsule, Rfl:   •  tamsulosin (FLOMAX) 0.4 MG capsule 24 hr capsule, Take 1 capsule by mouth., Disp: , Rfl:   •  TiZANidine (ZANAFLEX) 2 MG capsule, Take 1 capsule by mouth 3 (Three) Times a Day As Needed for Muscle Spasms., Disp: 90 capsule, Rfl: 0     Diagnoses and all orders for this visit:    Mixed hyperlipidemia    Stage 2 chronic kidney disease    Prediabetes    We reviewed his labs.  His triglycerides are still high, but everything else looks good.  Prediabetes is well controlled.  He will add some fish oil, get back on track with his diet, and will recheck in 6 months at his physical.  We discussed possible addition of Vascepa.        EMR Dragon/Transcription disclaimer:    Much of this encounter note is an electronic transcription/translation of spoken language to printed text.  The electronic translation of spoken language may permit erroneous, or at times, nonsensical words or phrases to be inadvertently transcribed.  Although I have reviewed the note for such errors some may still exist.

## 2021-01-07 DIAGNOSIS — E78.2 MIXED HYPERLIPIDEMIA: ICD-10-CM

## 2021-01-07 DIAGNOSIS — F41.9 ANXIETY: Primary | ICD-10-CM

## 2021-01-08 RX ORDER — ROSUVASTATIN CALCIUM 40 MG/1
40 TABLET, COATED ORAL DAILY
Qty: 90 TABLET | Refills: 3 | Status: SHIPPED | OUTPATIENT
Start: 2021-01-08 | End: 2022-01-13 | Stop reason: SDUPTHER

## 2021-01-08 RX ORDER — ESCITALOPRAM OXALATE 20 MG/1
20 TABLET ORAL DAILY
Qty: 90 TABLET | Refills: 3 | Status: SHIPPED | OUTPATIENT
Start: 2021-01-08 | End: 2022-01-13 | Stop reason: SDUPTHER

## 2021-01-13 ENCOUNTER — APPOINTMENT (OUTPATIENT)
Dept: INFUSION THERAPY | Facility: HOSPITAL | Age: 61
End: 2021-01-13

## 2021-02-22 ENCOUNTER — TELEPHONE (OUTPATIENT)
Dept: NEUROSURGERY | Facility: CLINIC | Age: 61
End: 2021-02-22

## 2021-02-22 NOTE — TELEPHONE ENCOUNTER
Attempted to reach the patient for his telephone visit with Dr. Velasquez, He did not answer and there was no option to leave a voicemail.

## 2021-03-08 ENCOUNTER — OFFICE VISIT (OUTPATIENT)
Dept: SPORTS MEDICINE | Facility: CLINIC | Age: 61
End: 2021-03-08

## 2021-03-08 ENCOUNTER — TELEPHONE (OUTPATIENT)
Dept: PAIN MEDICINE | Facility: CLINIC | Age: 61
End: 2021-03-08

## 2021-03-08 VITALS
HEART RATE: 77 BPM | TEMPERATURE: 98.7 F | BODY MASS INDEX: 34.86 KG/M2 | RESPIRATION RATE: 16 BRPM | WEIGHT: 230 LBS | DIASTOLIC BLOOD PRESSURE: 69 MMHG | OXYGEN SATURATION: 96 % | SYSTOLIC BLOOD PRESSURE: 111 MMHG | HEIGHT: 68 IN

## 2021-03-08 DIAGNOSIS — M25.511 PAIN OF RIGHT STERNOCLAVICULAR JOINT: Primary | ICD-10-CM

## 2021-03-08 DIAGNOSIS — R22.1 MASS OF RIGHT SIDE OF NECK: ICD-10-CM

## 2021-03-08 PROCEDURE — 99214 OFFICE O/P EST MOD 30 MIN: CPT | Performed by: FAMILY MEDICINE

## 2021-03-08 PROCEDURE — 71130 X-RAY STRENOCLAVIC JT 3/>VWS: CPT | Performed by: FAMILY MEDICINE

## 2021-03-08 NOTE — TELEPHONE ENCOUNTER
"Patient left a message stating that his ablation is wearing off and is ready for another one. He was last seen by you in September and your notes state, \"...Discussed that the RFL can be completed every 6 months as needed for pain...\"  "

## 2021-03-08 NOTE — PROGRESS NOTES
"Familia is a 60 y.o. year old male     Chief Complaint   Patient presents with   • Mass     right clavicle - feels it when turning the head - near the neck - 2 months       History of Present Illness  HPI   Here today for pain and swelling in the right upper chest/lower neck for about 1 month.  Worse with twisting the head to the left.    Review of Systems    /69 (BP Location: Right arm, Patient Position: Sitting, Cuff Size: Adult)   Pulse 77   Temp 98.7 °F (37.1 °C) (Oral)   Resp 16   Ht 172.7 cm (68\")   Wt 104 kg (230 lb)   SpO2 96%   BMI 34.97 kg/m²    There were no vitals filed for this visit.     Physical Exam    Vital signs reviewed.   General: No acute distress.      [facility] Exam:  Ortho Exam  Tender nodularity at the superior aspect of the right sternoclavicular joint.  There is not a specific palpable mass, feels more like fullness of the joint.  Pain is reproduced with leftward head rotation.    X-ray: Sternoclavicular joints  There is some questionable prominence of the proximal clavicle, otherwise normal    Diagnoses and all orders for this visit:    Pain of right sternoclavicular joint  -     XR Sternoclavicular Joints  -     CBC & Differential  -     C-reactive Protein  -     Sedimentation Rate  -     CT soft tissue neck w contrast; Future    Mass of right side of neck  -     CBC & Differential  -     C-reactive Protein  -     Sedimentation Rate  -     CT soft tissue neck w contrast; Future      Given unclear etiology we will work this up with labs and check a CT scan.  I clinically suspect this is sternoclavicular arthritis/arthrosis more than anything else.    EMR Dragon/Transcription disclaimer:    Much of this encounter note is an electronic transcription/translation of spoken language to printed text.  The electronic translation of spoken language may permit erroneous, or at times, nonsensical words or phrases to be inadvertently transcribed.  Although I have reviewed the note for such " errors some may still exist.

## 2021-03-09 LAB
BASOPHILS # BLD AUTO: 0 X10E3/UL (ref 0–0.2)
BASOPHILS NFR BLD AUTO: 0 %
CRP SERPL-MCNC: 1 MG/L (ref 0–10)
EOSINOPHIL # BLD AUTO: 0.1 X10E3/UL (ref 0–0.4)
EOSINOPHIL NFR BLD AUTO: 1 %
ERYTHROCYTE [DISTWIDTH] IN BLOOD BY AUTOMATED COUNT: 13.6 % (ref 11.6–15.4)
ERYTHROCYTE [SEDIMENTATION RATE] IN BLOOD BY WESTERGREN METHOD: 13 MM/HR (ref 0–30)
HCT VFR BLD AUTO: 43.8 % (ref 37.5–51)
HGB BLD-MCNC: 15.3 G/DL (ref 13–17.7)
IMM GRANULOCYTES # BLD AUTO: 0 X10E3/UL (ref 0–0.1)
IMM GRANULOCYTES NFR BLD AUTO: 0 %
LYMPHOCYTES # BLD AUTO: 1.9 X10E3/UL (ref 0.7–3.1)
LYMPHOCYTES NFR BLD AUTO: 26 %
MCH RBC QN AUTO: 31.3 PG (ref 26.6–33)
MCHC RBC AUTO-ENTMCNC: 34.9 G/DL (ref 31.5–35.7)
MCV RBC AUTO: 90 FL (ref 79–97)
MONOCYTES # BLD AUTO: 0.5 X10E3/UL (ref 0.1–0.9)
MONOCYTES NFR BLD AUTO: 7 %
NEUTROPHILS # BLD AUTO: 4.7 X10E3/UL (ref 1.4–7)
NEUTROPHILS NFR BLD AUTO: 66 %
PLATELET # BLD AUTO: 155 X10E3/UL (ref 150–450)
RBC # BLD AUTO: 4.89 X10E6/UL (ref 4.14–5.8)
WBC # BLD AUTO: 7.2 X10E3/UL (ref 3.4–10.8)

## 2021-03-11 NOTE — PROGRESS NOTES
Called and spoke to patient, voiced labs came back normal, patient verbally understood information relayed to him.   -Erinn

## 2021-03-12 ENCOUNTER — OFFICE VISIT (OUTPATIENT)
Dept: PAIN MEDICINE | Facility: CLINIC | Age: 61
End: 2021-03-12

## 2021-03-12 ENCOUNTER — PREP FOR SURGERY (OUTPATIENT)
Dept: SURGERY | Facility: SURGERY CENTER | Age: 61
End: 2021-03-12

## 2021-03-12 VITALS
DIASTOLIC BLOOD PRESSURE: 72 MMHG | HEART RATE: 76 BPM | OXYGEN SATURATION: 94 % | WEIGHT: 240 LBS | HEIGHT: 68 IN | BODY MASS INDEX: 36.37 KG/M2 | SYSTOLIC BLOOD PRESSURE: 129 MMHG | RESPIRATION RATE: 18 BRPM | TEMPERATURE: 97.3 F

## 2021-03-12 DIAGNOSIS — M47.814 THORACIC SPONDYLOSIS WITHOUT MYELOPATHY: Primary | ICD-10-CM

## 2021-03-12 DIAGNOSIS — M47.814 FACET ARTHROPATHY, THORACIC: ICD-10-CM

## 2021-03-12 DIAGNOSIS — M54.6 PAIN IN THORACIC SPINE: ICD-10-CM

## 2021-03-12 PROCEDURE — 99214 OFFICE O/P EST MOD 30 MIN: CPT | Performed by: NURSE PRACTITIONER

## 2021-03-12 RX ORDER — SODIUM CHLORIDE 0.9 % (FLUSH) 0.9 %
10 SYRINGE (ML) INJECTION EVERY 12 HOURS SCHEDULED
Status: CANCELLED | OUTPATIENT
Start: 2021-03-12

## 2021-03-12 RX ORDER — SODIUM CHLORIDE 0.9 % (FLUSH) 0.9 %
10 SYRINGE (ML) INJECTION AS NEEDED
Status: CANCELLED | OUTPATIENT
Start: 2021-03-12

## 2021-03-12 NOTE — PROGRESS NOTES
CHIEF COMPLAINT  Back and neck pain has increased since last visit    Subjective   Familia Duke is a 60 y.o. male  who presents for follow-up.  He has a history of back and neck pain. He completed a Left T9-T11 radiofrequency lesioning on 7/30/2020 with 95% relief lasting    Today his pain is 7/10VAS in severity.  His pain returned 2-3 weeks ago and he describes this pain axial mid-back pain that is continuous aching, burning, sharp, shooting pain.  His pain is worsened by prolonged standing, sitting without support; his pain is improved with lying down and position change.  He is utilizing OTC tylenol and aleve for his pain.  He was also started on Plaquenil recently for his arthritis--He is working with Dr. Hodges (rheum) regarding this.     He completed carpal tunnel surgery on 12/12/20 on the left hand and 1/12/21 on the right hand with Dr. Guido. He notes improved in his bilateral hand pain and weakness.     Patient remained masked during entire encounter. No cough present. I donned a mask and eye protection throughout entire visit. Prior to donning mask and eye protection, hand hygiene was performed, as well as when it was doffed.  I was closer than 6 feet, but not for an extended period of time. No obvious exposure to any bodily fluids.    Back Pain  This is a chronic problem. The current episode started more than 1 year ago. The problem occurs constantly. The problem has been gradually worsening since onset. The pain is present in the thoracic spine. The quality of the pain is described as aching, burning and stabbing. The pain does not radiate. The pain is at a severity of 7/10. The symptoms are aggravated by bending, twisting, standing and position (prolonged walking). Pertinent negatives include no abdominal pain, chest pain, dysuria, fever, headaches, numbness or weakness. Risk factors include obesity, poor posture and lack of exercise. He has tried analgesics, NSAIDs, heat and home exercises  "(tylenol, aleve) for the symptoms. The treatment provided significant (RFL) relief.      PEG Assessment   What number best describes your pain on average in the past week?4  What number best describes how, during the past week, pain has interfered with your enjoyment of life?5  What number best describes how, during the past week, pain has interfered with your general activity?  4    The following portions of the patient's history were reviewed and updated as appropriate: allergies, current medications, past family history, past medical history, past social history, past surgical history and problem list.    Review of Systems   Constitutional: Negative for chills and fever.   Respiratory: Negative for shortness of breath.    Cardiovascular: Negative for chest pain.   Gastrointestinal: Negative for abdominal pain, constipation, diarrhea, nausea and vomiting.   Genitourinary: Negative for difficulty urinating, dysuria and enuresis.   Musculoskeletal: Positive for back pain and neck pain.   Neurological: Negative for dizziness, weakness, light-headedness, numbness and headaches.   Psychiatric/Behavioral: Positive for sleep disturbance. Negative for confusion, hallucinations, self-injury and suicidal ideas.   All other systems reviewed and are negative.    --  The aforementioned information the Chief Complaint section and above subjective data including any HPI data, and also the Review of Systems data, has been personally reviewed and affirmed.  --    Vitals:    03/12/21 0847   BP: 129/72   Pulse: 76   Resp: 18   Temp: 97.3 °F (36.3 °C)   SpO2: 94%   Weight: 109 kg (240 lb)   Height: 172.7 cm (68\")   PainSc:   7   PainLoc: Back     Objective   Physical Exam  Vitals and nursing note reviewed.   Constitutional:       Appearance: Normal appearance. He is well-developed.   Eyes:      General: Lids are normal.   Cardiovascular:      Rate and Rhythm: Normal rate.   Pulmonary:      Effort: Pulmonary effort is normal. "   Musculoskeletal:      Cervical back: Normal range of motion.      Thoracic back: Tenderness and bony tenderness (both left and right thoracic spine) present. Scoliosis present.   Neurological:      Mental Status: He is alert and oriented to person, place, and time.      Gait: Gait normal.   Psychiatric:         Attention and Perception: Attention normal.         Mood and Affect: Mood normal.         Speech: Speech normal.         Behavior: Behavior normal.         Judgment: Judgment normal.       Assessment/Plan   Diagnoses and all orders for this visit:    1. Thoracic spondylosis without myelopathy (Primary)    2. Pain in thoracic spine    3. Facet arthropathy, thoracic      --- Bilateral T9-T11 medial branch blockade with goal of performing RFL  Reviewed the procedure at length with the patient.  Included in the review was expectations, complications, risk and benefits.The procedure was described in detail and the risks, benefits and alternatives were discussed with the patient (including but not limited to: bleeding, infection, nerve damage, worsening of pain, inability to perform injection, paralysis, seizures, and death) who agreed to proceed.  Discussed the potential for sedation if warranted/wanted.  The procedure will plan to be performed at [facility] with fluoroscopic guidance(unless ultrasound is indicated) and could potentially have steroids and contrast dye used. Questions were answered and in a way the patient could understand.  Patient verbalized understanding and wishes to proceed.  This intervention will be ordered.  Discussed with patient that all procedures are part of a multimodal plan of care and include either formal PT or a home exercise program.  Patient has no evidence of coagulopathy or current infection.    -------  Education about Medial Branch Blockade and RF Therapy:    This medial branch blockade (MBB) suggested is intended for diagnostic purposes, with the  "intent of offering the patient Radiofrequency thermal rhizotomy (RF) if the MBB is diagnostically effective.  The diagnostic blockade is necessary to determine the likelihood that RF therapy could be efficacious in providing long term relief to the patient.    Medial branches are sensory nerve branches that connect to a facet joint and transmit sensations & pain signals from that joint.  Facet is a term for the type of joints found in the spine.  Medial branches are the nerves that go to a facet, and therefore are also sometimes called \"facet joint nerves\" (FJNs).      In a medial branch blockade procedure, xray fluoroscopy is used to verify the locations of the outside of the joint lines which are being targeted.  Under xray guidance, needles are placed to these areas.  Contrast dye is injected to confirm proper placement, with dye flowing over the joint area, and to ensure that the dye does not flow into unintended areas such as a vein.  When this is confirmed, local anesthetic is injected to block the medial branch at that joint level.      If MBBs are diagnostically successful in blocking pain, then the patient is most likely a great candidate for Radiofrequency of those facet joint nerves.  In the RF procedure, needles are placed to the joint lines in the same fashion, and after testing, the needle tips are heated to thermally treat the nerves, blocking the nerves by in essence damaging the nerves with the heat treatment.       Medically, a successful RF procedure should provide a patient with 50% pain relief or more for at least 6 months.  Clinical experience suggests that successful patients receive relief more in the range of 12 months on average.  We also discussed that a fortunate minority of patients receive therapeutic success from the MBB, and may not require RF ablation.  If a patient receives more than 8 weeks of relief from MBB, then occasional repeat MBB for therapeutic purposes is a very reasonable " alternative therapy.  This course of therapy is consistent with our LCDs according to our CMS  in the area, and therefore other insurance providers should follow accordingly.  We will monitor our patients to screen for these therapeutic responders and will offer RF therapy only when necessary.        We discussed that MBB & RF are not without risks.  Guidelines regarding anticoagulant use & neuraxial procedures will be respected.  Patients that are ill or otherwise may be at risk for sepsis will not have their spines accessed by neuraxial injections of any type.  This patient will not be offered these therapies if there is an increased risk.   We discussed that there is a risk of postprocedural pain and also a risk of worsening of clinical picture with these procedures as with any neuraxial procedure.    -------  --- Follow-up after procedure     MILO REPORT  As part of the patient's treatment plan, I am prescribing controlled substances. The patient has been made aware of appropriate use of such medications, including potential risk of somnolence, limited ability to drive and/or work safely, and the potential for dependence or overdose. It has also bee made clear that these medications are for use by this patient only, without concomitant use of alcohol or other substances unless prescribed.     Patient has completed prescribing agreement detailing terms of continued prescribing of controlled substances, including monitoring MILO reports, urine drug screening, and pill counts if necessary. The patient is aware that inappropriate use will results in cessation of prescribing such medications.    MILO report has been reviewed and scanned into the patient's chart.    As the clinician, I personally reviewed the MILO from 3/12/2021 while the patient was in the office today.    History and physical exam exhibit continued safe and appropriate use of controlled substances.    EMR Dragon/Transcription  disclaimer:   Much of this encounter note is an electronic transcription/translation of spoken language to printed text. The electronic translation of spoken language may permit erroneous, or at times, nonsensical words or phrases to be inadvertently transcribed; Although I have reviewed the note for such errors, some may still exist.

## 2021-03-12 NOTE — H&P (VIEW-ONLY)
CHIEF COMPLAINT  Back and neck pain has increased since last visit    Subjective   Familia Duke is a 60 y.o. male  who presents for follow-up.  He has a history of back and neck pain. He completed a Left T9-T11 radiofrequency lesioning on 7/30/2020 with 95% relief lasting    Today his pain is 7/10VAS in severity.  His pain returned 2-3 weeks ago and he describes this pain axial mid-back pain that is continuous aching, burning, sharp, shooting pain.  His pain is worsened by prolonged standing, sitting without support; his pain is improved with lying down and position change.  He is utilizing OTC tylenol and aleve for his pain.  He was also started on Plaquenil recently for his arthritis--He is working with Dr. Hodges (rheum) regarding this.     He completed carpal tunnel surgery on 12/12/20 on the left hand and 1/12/21 on the right hand with Dr. Guido. He notes improved in his bilateral hand pain and weakness.     Patient remained masked during entire encounter. No cough present. I donned a mask and eye protection throughout entire visit. Prior to donning mask and eye protection, hand hygiene was performed, as well as when it was doffed.  I was closer than 6 feet, but not for an extended period of time. No obvious exposure to any bodily fluids.    Back Pain  This is a chronic problem. The current episode started more than 1 year ago. The problem occurs constantly. The problem has been gradually worsening since onset. The pain is present in the thoracic spine. The quality of the pain is described as aching, burning and stabbing. The pain does not radiate. The pain is at a severity of 7/10. The symptoms are aggravated by bending, twisting, standing and position (prolonged walking). Pertinent negatives include no abdominal pain, chest pain, dysuria, fever, headaches, numbness or weakness. Risk factors include obesity, poor posture and lack of exercise. He has tried analgesics, NSAIDs, heat and home exercises  "(tylenol, aleve) for the symptoms. The treatment provided significant (RFL) relief.      PEG Assessment   What number best describes your pain on average in the past week?4  What number best describes how, during the past week, pain has interfered with your enjoyment of life?5  What number best describes how, during the past week, pain has interfered with your general activity?  4    The following portions of the patient's history were reviewed and updated as appropriate: allergies, current medications, past family history, past medical history, past social history, past surgical history and problem list.    Review of Systems   Constitutional: Negative for chills and fever.   Respiratory: Negative for shortness of breath.    Cardiovascular: Negative for chest pain.   Gastrointestinal: Negative for abdominal pain, constipation, diarrhea, nausea and vomiting.   Genitourinary: Negative for difficulty urinating, dysuria and enuresis.   Musculoskeletal: Positive for back pain and neck pain.   Neurological: Negative for dizziness, weakness, light-headedness, numbness and headaches.   Psychiatric/Behavioral: Positive for sleep disturbance. Negative for confusion, hallucinations, self-injury and suicidal ideas.   All other systems reviewed and are negative.    --  The aforementioned information the Chief Complaint section and above subjective data including any HPI data, and also the Review of Systems data, has been personally reviewed and affirmed.  --    Vitals:    03/12/21 0847   BP: 129/72   Pulse: 76   Resp: 18   Temp: 97.3 °F (36.3 °C)   SpO2: 94%   Weight: 109 kg (240 lb)   Height: 172.7 cm (68\")   PainSc:   7   PainLoc: Back     Objective   Physical Exam  Vitals and nursing note reviewed.   Constitutional:       Appearance: Normal appearance. He is well-developed.   Eyes:      General: Lids are normal.   Cardiovascular:      Rate and Rhythm: Normal rate.   Pulmonary:      Effort: Pulmonary effort is normal. "   Musculoskeletal:      Cervical back: Normal range of motion.      Thoracic back: Tenderness and bony tenderness (both left and right thoracic spine) present. Scoliosis present.   Neurological:      Mental Status: He is alert and oriented to person, place, and time.      Gait: Gait normal.   Psychiatric:         Attention and Perception: Attention normal.         Mood and Affect: Mood normal.         Speech: Speech normal.         Behavior: Behavior normal.         Judgment: Judgment normal.       Assessment/Plan   Diagnoses and all orders for this visit:    1. Thoracic spondylosis without myelopathy (Primary)    2. Pain in thoracic spine    3. Facet arthropathy, thoracic      --- Bilateral T9-T11 medial branch blockade with goal of performing RFL  Reviewed the procedure at length with the patient.  Included in the review was expectations, complications, risk and benefits.The procedure was described in detail and the risks, benefits and alternatives were discussed with the patient (including but not limited to: bleeding, infection, nerve damage, worsening of pain, inability to perform injection, paralysis, seizures, and death) who agreed to proceed.  Discussed the potential for sedation if warranted/wanted.  The procedure will plan to be performed at [facility] with fluoroscopic guidance(unless ultrasound is indicated) and could potentially have steroids and contrast dye used. Questions were answered and in a way the patient could understand.  Patient verbalized understanding and wishes to proceed.  This intervention will be ordered.  Discussed with patient that all procedures are part of a multimodal plan of care and include either formal PT or a home exercise program.  Patient has no evidence of coagulopathy or current infection.    -------  Education about Medial Branch Blockade and RF Therapy:    This medial branch blockade (MBB) suggested is intended for diagnostic purposes, with the  "intent of offering the patient Radiofrequency thermal rhizotomy (RF) if the MBB is diagnostically effective.  The diagnostic blockade is necessary to determine the likelihood that RF therapy could be efficacious in providing long term relief to the patient.    Medial branches are sensory nerve branches that connect to a facet joint and transmit sensations & pain signals from that joint.  Facet is a term for the type of joints found in the spine.  Medial branches are the nerves that go to a facet, and therefore are also sometimes called \"facet joint nerves\" (FJNs).      In a medial branch blockade procedure, xray fluoroscopy is used to verify the locations of the outside of the joint lines which are being targeted.  Under xray guidance, needles are placed to these areas.  Contrast dye is injected to confirm proper placement, with dye flowing over the joint area, and to ensure that the dye does not flow into unintended areas such as a vein.  When this is confirmed, local anesthetic is injected to block the medial branch at that joint level.      If MBBs are diagnostically successful in blocking pain, then the patient is most likely a great candidate for Radiofrequency of those facet joint nerves.  In the RF procedure, needles are placed to the joint lines in the same fashion, and after testing, the needle tips are heated to thermally treat the nerves, blocking the nerves by in essence damaging the nerves with the heat treatment.       Medically, a successful RF procedure should provide a patient with 50% pain relief or more for at least 6 months.  Clinical experience suggests that successful patients receive relief more in the range of 12 months on average.  We also discussed that a fortunate minority of patients receive therapeutic success from the MBB, and may not require RF ablation.  If a patient receives more than 8 weeks of relief from MBB, then occasional repeat MBB for therapeutic purposes is a very reasonable " alternative therapy.  This course of therapy is consistent with our LCDs according to our CMS  in the area, and therefore other insurance providers should follow accordingly.  We will monitor our patients to screen for these therapeutic responders and will offer RF therapy only when necessary.        We discussed that MBB & RF are not without risks.  Guidelines regarding anticoagulant use & neuraxial procedures will be respected.  Patients that are ill or otherwise may be at risk for sepsis will not have their spines accessed by neuraxial injections of any type.  This patient will not be offered these therapies if there is an increased risk.   We discussed that there is a risk of postprocedural pain and also a risk of worsening of clinical picture with these procedures as with any neuraxial procedure.    -------  --- Follow-up after procedure     MILO REPORT  As part of the patient's treatment plan, I am prescribing controlled substances. The patient has been made aware of appropriate use of such medications, including potential risk of somnolence, limited ability to drive and/or work safely, and the potential for dependence or overdose. It has also bee made clear that these medications are for use by this patient only, without concomitant use of alcohol or other substances unless prescribed.     Patient has completed prescribing agreement detailing terms of continued prescribing of controlled substances, including monitoring MILO reports, urine drug screening, and pill counts if necessary. The patient is aware that inappropriate use will results in cessation of prescribing such medications.    MILO report has been reviewed and scanned into the patient's chart.    As the clinician, I personally reviewed the MILO from 3/12/2021 while the patient was in the office today.    History and physical exam exhibit continued safe and appropriate use of controlled substances.    EMR Dragon/Transcription  disclaimer:   Much of this encounter note is an electronic transcription/translation of spoken language to printed text. The electronic translation of spoken language may permit erroneous, or at times, nonsensical words or phrases to be inadvertently transcribed; Although I have reviewed the note for such errors, some may still exist.

## 2021-03-16 ENCOUNTER — TRANSCRIBE ORDERS (OUTPATIENT)
Dept: SURGERY | Facility: SURGERY CENTER | Age: 61
End: 2021-03-16

## 2021-03-16 DIAGNOSIS — M47.814 THORACIC SPONDYLOSIS WITHOUT MYELOPATHY: Primary | ICD-10-CM

## 2021-03-19 ENCOUNTER — HOSPITAL ENCOUNTER (OUTPATIENT)
Dept: CT IMAGING | Facility: HOSPITAL | Age: 61
Discharge: HOME | End: 2021-03-19
Admitting: FAMILY MEDICINE

## 2021-03-19 ENCOUNTER — BULK ORDERING (OUTPATIENT)
Dept: CASE MANAGEMENT | Facility: OTHER | Age: 61
End: 2021-03-19

## 2021-03-19 DIAGNOSIS — Z23 IMMUNIZATION DUE: ICD-10-CM

## 2021-03-19 DIAGNOSIS — R22.1 MASS OF RIGHT SIDE OF NECK: ICD-10-CM

## 2021-03-19 DIAGNOSIS — M25.511 PAIN OF RIGHT STERNOCLAVICULAR JOINT: ICD-10-CM

## 2021-03-19 PROCEDURE — 82565 ASSAY OF CREATININE: CPT

## 2021-03-19 PROCEDURE — 25010000002 IOPAMIDOL 61 % SOLUTION: Performed by: FAMILY MEDICINE

## 2021-03-19 PROCEDURE — 70491 CT SOFT TISSUE NECK W/DYE: CPT

## 2021-03-19 RX ADMIN — IOPAMIDOL 85 ML: 612 INJECTION, SOLUTION INTRAVENOUS at 08:27

## 2021-03-21 LAB — CREAT BLDA-MCNC: 1.1 MG/DL (ref 0.6–1.3)

## 2021-03-22 ENCOUNTER — TRANSCRIBE ORDERS (OUTPATIENT)
Dept: LAB | Facility: SURGERY CENTER | Age: 61
End: 2021-03-22

## 2021-03-22 ENCOUNTER — LAB (OUTPATIENT)
Dept: LAB | Facility: SURGERY CENTER | Age: 61
End: 2021-03-22

## 2021-03-22 DIAGNOSIS — Z01.818 OTHER SPECIFIED PRE-OPERATIVE EXAMINATION: ICD-10-CM

## 2021-03-22 DIAGNOSIS — Z01.818 OTHER SPECIFIED PRE-OPERATIVE EXAMINATION: Primary | ICD-10-CM

## 2021-03-22 LAB — SARS-COV-2 ORF1AB RESP QL NAA+PROBE: NOT DETECTED

## 2021-03-22 PROCEDURE — U0004 COV-19 TEST NON-CDC HGH THRU: HCPCS | Performed by: SURGERY

## 2021-03-22 PROCEDURE — C9803 HOPD COVID-19 SPEC COLLECT: HCPCS

## 2021-03-22 NOTE — SIGNIFICANT NOTE
Patient educated on the following :    - If you are receiving Sedation for your procedure Nothing to Eat after     and only clear liquids until       -You will need to have someone drive you home after your PROCEDURE and remain with you for 24 hours after the PROCEDURE  - The date of your PROCEDURE, your ride is welcome to either remain in our parking lot or within 10-15 minutes of [employer]  -You will need to arrive at    1015       on  3/24         for your PROCEDURE  -Please contact [employer] [facility] at: [phone] with any questions and/or concerns

## 2021-03-23 NOTE — PROGRESS NOTES
Called patient in regards to CT soft tissue neck results. Informed him (CT scan of the neck is unremarkable.  There is no mass in the underlying joint is normal without arthritis.  I suspect that your pain is secondary to muscle stress at the attachment to the bone there.  There are no overall concerns here.) per Dr. Kunz.

## 2021-03-24 ENCOUNTER — HOSPITAL ENCOUNTER (OUTPATIENT)
Facility: SURGERY CENTER | Age: 61
Setting detail: HOSPITAL OUTPATIENT SURGERY
Discharge: HOME | End: 2021-03-24
Attending: ANESTHESIOLOGY | Admitting: ANESTHESIOLOGY

## 2021-03-24 ENCOUNTER — HOSPITAL ENCOUNTER (OUTPATIENT)
Dept: GENERAL RADIOLOGY | Facility: SURGERY CENTER | Age: 61
Setting detail: HOSPITAL OUTPATIENT SURGERY
End: 2021-03-24

## 2021-03-24 VITALS
HEART RATE: 61 BPM | SYSTOLIC BLOOD PRESSURE: 109 MMHG | HEIGHT: 68 IN | WEIGHT: 230 LBS | BODY MASS INDEX: 34.86 KG/M2 | OXYGEN SATURATION: 94 % | RESPIRATION RATE: 16 BRPM | TEMPERATURE: 97.4 F | DIASTOLIC BLOOD PRESSURE: 73 MMHG

## 2021-03-24 DIAGNOSIS — M47.814 THORACIC SPONDYLOSIS WITHOUT MYELOPATHY: ICD-10-CM

## 2021-03-24 PROCEDURE — 64490 INJ PARAVERT F JNT C/T 1 LEV: CPT | Performed by: ANESTHESIOLOGY

## 2021-03-24 PROCEDURE — 3E0T33Z INTRODUCTION OF ANTI-INFLAMMATORY INTO PERIPHERAL NERVES AND PLEXI, PERCUTANEOUS APPROACH: ICD-10-PCS | Performed by: ANESTHESIOLOGY

## 2021-03-24 PROCEDURE — 0 IOHEXOL 300 MG/ML SOLUTION 10 ML VIAL: Performed by: ANESTHESIOLOGY

## 2021-03-24 PROCEDURE — 64491 INJ PARAVERT F JNT C/T 2 LEV: CPT | Performed by: ANESTHESIOLOGY

## 2021-03-24 PROCEDURE — 64492 INJ PARAVERT F JNT C/T 3 LEV: CPT | Performed by: ANESTHESIOLOGY

## 2021-03-24 PROCEDURE — 25010000002 MIDAZOLAM PER 1 MG: Performed by: ANESTHESIOLOGY

## 2021-03-24 RX ORDER — MIDAZOLAM HYDROCHLORIDE 1 MG/ML
INJECTION INTRAMUSCULAR; INTRAVENOUS AS NEEDED
Status: DISCONTINUED | OUTPATIENT
Start: 2021-03-24 | End: 2021-03-24 | Stop reason: HOSPADM

## 2021-03-24 RX ORDER — SODIUM CHLORIDE 0.9 % (FLUSH) 0.9 %
10 SYRINGE (ML) INJECTION AS NEEDED
Status: DISCONTINUED | OUTPATIENT
Start: 2021-03-24 | End: 2021-03-24 | Stop reason: HOSPADM

## 2021-03-24 RX ORDER — SODIUM CHLORIDE 0.9 % (FLUSH) 0.9 %
10 SYRINGE (ML) INJECTION EVERY 12 HOURS SCHEDULED
Status: DISCONTINUED | OUTPATIENT
Start: 2021-03-24 | End: 2021-03-24 | Stop reason: HOSPADM

## 2021-03-24 NOTE — OP NOTE
Thoracic Medial Branch Blockades:  [facility]      PREOPERATIVE DIAGNOSIS:  Thoracic spondylosis without myelopathy    POSTOPERATIVE DIAGNOSIS:  Same as above.       PROCEDURE:   Diagnostic Thoracic Medial Branch Nerve Blockades, with fluoroscopy:   - LEVELS:  bilateral  T9, T10 and T11    PRE-PROCEDURE DISCUSSION WITH PATIENT:    Risks and complications were discussed with the patient prior to starting the procedure and informed consent was obtained.   We discussed various topics including but not limited to bleeding, infection, injury, nerve injury, paralysis, coma, death, pneumothorax, worsening of clinical picture, postprocedural soreness, and lack of pain relief.  We discussed the diagnostic nature of this procedure as well as the potential for possible long term therapeutic relief, and the odds of such.    SURGEON:  Javon Springer MD    REASON FOR PROCEDURE:    Previous RF+    SEDATION:  Versed 6mg IV  ANESTHETIC:  Marcaine 0.5%  STEROID:  NONE  TOTAL VOLUME OF SOLUTION:  3 ml    DESCRIPTON OF PROCEDURE:  After obtaining informed consent, IV access was  obtained in the preoperative area.   The patient was taken to the operating room.  The patient was placed in the prone position with a pillow under the abdomen. All pressure points were well padded.  EKG, blood pressure, and pulse oximeter were monitored.  The patient was monitored and sedated by the RN under my direction. The thoracic area was prepped with Chloraprep and draped in a sterile fashion.     Under fluoroscopic guidance at each of the levels to address, as above, the transverse processes those vertebrae at the junctions of the superior articular processes were identified.  Skin and subcutaneous tissue were anesthetized with 1% lidocaine above each of these points.     A spinal needle was introduced under fluoroscopic guidance toward each of the above junctions, advancing under serial fluoroscopic images to ensure that the  needle tip was overlying the transverse process throughout the course of each needle.  Then a lateral image was taken for each needle position to ensure proper depth and that the needles were near the joint.  Aspiration was negative for blood and CSF.  After confirming the position of the needle with fluoroscope in all views, 0.25 mL of Omnipaque was injected, and after seeing the proper spread a total of 0.5 mL of the anesthetic solution noted above was injected at each of these points.  Needles were removed intact from each of the areas.  Onset of analgesia was noted.  Vital signs remained stable throughout.        ESTIMATED BLOOD LOSS:  <5 mL  SPECIMENS:  none    COMPLICATIONS:   No complications were noted., There was no indication of vascular uptake on live injection of contrast dye., There was no indication of intrathecal uptake on live injection of contrast dye., There was not any evidence of dural puncture.   and The patient did not have any signs of postprocedure numbness nor weakness.    TOLERANCE & DISCHARGE CONDITION:    The patient tolerated the procedure well.  The patient was transported to the recovery area without difficulties.  The patient was discharged to home under the care of family in stable and satisfactory condition.    PLAN OF CARE:  1. The patient was given our standard instruction sheet.  2. We discussed that Medial Branch Blockade is a diagnostic procedure in consideration for radiofrequency ablation if two diagnostic procedures prove to be positive for significant benefit.  If sustained relief of 6 to eight weeks is obtained, then an alternative plan could be therapeutic lumbar branch blockades.  3. The patient is asked to keep a pain log each hour for 8 hours after the procedure today.  4. The patient will  Return to clinic 1 wk  5. The patient will resume all medications as per the medication reconciliation sheet.

## 2021-03-24 NOTE — DISCHARGE INSTRUCTIONS
[facility] - Post-procedure Instructions          --  While there are no absolute restrictions, it is recommended that you do not perform strenuous activity today. In the morning, you may resume your level of activity as before your block.    --  If you have a band-aid at your injection site, please remove it later today. Observe the area for any redness, swelling, pus-like drainage, or a temperature over 101°. If any of these symptoms occur, please call your doctor at [phone]. If after office hours, leave a message and the on-call provider will return your call.    --  Ice may be applied to your injection site. It is recommended you avoid direct heat (heating pad; hot tub) for 1-2 days.    --  Call [facility] at [phone] if you experience persistent headache, persistent bleeding from the injection site, or severe pain not relieved by heat or oral medication.    --  Do not make important decisions today.    --  Due to the effects of the block and/or the I.V. Sedation, DO NOT drive or operate hazardous machinery for 12 hours.    --  Do not drink alcohol for 12 hours.    -- You may return to work tomorrow, or as directed by your referring doctor.    --  Occasionally you may notice a slight increase in your pain after the procedure. This should start to improve within the next 24-48 hours. Radiofrequency ablation procedure pain may last 3-4 weeks.    --  It may take as long as 3-4 days before you notice a gradual improvement in your pain and/or other symptoms.    -- You may continue to take your prescribed pain medication as needed.    --  Some normal possible side effects of steroid use could include fluid retention, increased blood sugar, dull headache, increased sweating, increased appetite, mood swings and flushing.    --  Diabetics are recommended to watch their blood glucose level closely for 24-48 hours after the injection.    --  Must stay in PACU for 20 min upon arrival and  prove no leg weakness before being discharged.    --  IN THE EVENT OF A LIFE THREATENING EMERGENCY, (CHEST PAIN, BREATHING DIFFICULTIES, PARALYSIS…) YOU SHOULD GO TO YOUR NEAREST EMERGENCY ROOM.    --  You should be contacted by our office within 2-3 days to schedule follow up or next appointment date.  If not contacted within 7 days, please call the office at [phone]       [facility] - Post-procedure Instructions          --  While there are no absolute restrictions, it is recommended that you do not perform strenuous activity today. In the morning, you may resume your level of activity as before your block.    --  If you have a band-aid at your injection site, please remove it later today. Observe the area for any redness, swelling, pus-like drainage, or a temperature over 101°. If any of these symptoms occur, please call your doctor at [phone]. If after office hours, leave a message and the on-call provider will return your call.    --  Ice may be applied to your injection site. It is recommended you avoid direct heat (heating pad; hot tub) for 1-2 days.    --  Call [facility] at [phone] if you experience persistent headache, persistent bleeding from the injection site, or severe pain not relieved by heat or oral medication.    --  Do not make important decisions today.    --  Due to the effects of the block and/or the I.V. Sedation, DO NOT drive or operate hazardous machinery for 12 hours.    --  Do not drink alcohol for 12 hours.    -- You may return to work tomorrow, or as directed by your referring doctor.    --  Occasionally you may notice a slight increase in your pain after the procedure. This should start to improve within the next 24-48 hours. Radiofrequency ablation procedure pain may last 3-4 weeks.    --  It may take as long as 3-4 days before you notice a gradual improvement in your pain and/or other symptoms.    -- You may continue to take your prescribed pain  "medication as needed.    --  Some normal possible side effects of steroid use could include fluid retention, increased blood sugar, dull headache, increased sweating, increased appetite, mood swings and flushing.    --  Diabetics are recommended to watch their blood glucose level closely for 24-48 hours after the injection.    --  Must stay in PACU for 20 min upon arrival and prove no leg weakness before being discharged.    --  IN THE EVENT OF A LIFE THREATENING EMERGENCY, (CHEST PAIN, BREATHING DIFFICULTIES, PARALYSIS…) YOU SHOULD GO TO YOUR NEAREST EMERGENCY ROOM.    --  You should be contacted by our office within 2-3 days to schedule follow up or next appointment date.  If not contacted within 7 days, please call the office at [phone]      -----    -------  Education about Medial Branch Blockade and RF Therapy:    This medial branch blockade (MBB) suggested is intended for diagnostic purposes, with the intent of offering the patient Radiofrequency thermal rhizotomy (RF) if the MBB is diagnostically effective.  The diagnostic blockade is necessary to determine the likelihood that RF therapy could be efficacious in providing long term relief to the patient.    Medial branches are sensory nerve branches that connect to a facet joint and transmit sensations & pain signals from that joint.  Facet is a term for the type of joints found in the spine.  Medial branches are the nerves that go to a facet, and therefore are also sometimes called \"facet joint nerves\" (FJNs).      In a medial branch blockade procedure, xray fluoroscopy is used to verify the locations of the outside of the joint lines which are being targeted.  Under xray guidance, needles are placed to these areas.  Contrast dye is injected to confirm proper placement, with dye flowing over the joint area, and to ensure that the dye does not flow into unintended areas such as a vein.  When this is confirmed, local anesthetic is injected to block the " medial branch at that joint level.      If MBBs are diagnostically successful in blocking pain, then the patient is most likely a great candidate for Radiofrequency of those facet joint nerves.  In the RF procedure, needles are placed to the joint lines in the same fashion, and after testing, the needle tips are heated to thermally treat the nerves, blocking the nerves by in essence damaging the nerves with the heat treatment.       Medically, a successful RF procedure should provide a patient with 50% pain relief or more for at least 6 months.  Clinical experience suggests that successful patients receive relief more in the range of 12 months on average.  We also discussed that a fortunate minority of patients receive therapeutic success from the MBB, and may not require RF ablation.  If a patient receives more than 8 weeks of relief from MBB, then occasional repeat MBB for therapeutic purposes is a very reasonable alternative therapy.  This course of therapy is consistent with our LCDs according to our CMS  in the area, and therefore other insurance providers should follow accordingly.  We will monitor our patients to screen for these therapeutic responders and will offer RF therapy only when necessary.        We discussed that MBB & RF are not without risks.  Guidelines regarding anticoagulant use & neuraxial procedures will be respected.  Patients that are ill or otherwise may be at risk for sepsis will not have their spines accessed by neuraxial injections of any type.  This patient will not be offered these therapies if there is an increased risk.   We discussed that there is a risk of postprocedural pain and also a risk of worsening of clinical picture with these procedures as with any neuraxial procedure.    -------

## 2021-04-02 ENCOUNTER — PREP FOR SURGERY (OUTPATIENT)
Dept: SURGERY | Facility: SURGERY CENTER | Age: 61
End: 2021-04-02

## 2021-04-02 ENCOUNTER — OFFICE VISIT (OUTPATIENT)
Dept: PAIN MEDICINE | Facility: CLINIC | Age: 61
End: 2021-04-02

## 2021-04-02 VITALS
OXYGEN SATURATION: 97 % | DIASTOLIC BLOOD PRESSURE: 66 MMHG | SYSTOLIC BLOOD PRESSURE: 118 MMHG | WEIGHT: 244 LBS | TEMPERATURE: 96.8 F | HEART RATE: 73 BPM | BODY MASS INDEX: 36.98 KG/M2 | HEIGHT: 68 IN | RESPIRATION RATE: 18 BRPM

## 2021-04-02 DIAGNOSIS — G89.29 CHRONIC MIDLINE THORACIC BACK PAIN: ICD-10-CM

## 2021-04-02 DIAGNOSIS — M47.814 FACET ARTHROPATHY, THORACIC: ICD-10-CM

## 2021-04-02 DIAGNOSIS — M50.13 CERVICAL DISC DISORDER WITH RADICULOPATHY OF CERVICOTHORACIC REGION: ICD-10-CM

## 2021-04-02 DIAGNOSIS — M47.814 THORACIC SPONDYLOSIS WITHOUT MYELOPATHY: Primary | ICD-10-CM

## 2021-04-02 DIAGNOSIS — M54.2 CHRONIC NECK PAIN: ICD-10-CM

## 2021-04-02 DIAGNOSIS — M50.23: ICD-10-CM

## 2021-04-02 DIAGNOSIS — M54.6 CHRONIC MIDLINE THORACIC BACK PAIN: ICD-10-CM

## 2021-04-02 DIAGNOSIS — G89.29 CHRONIC NECK PAIN: ICD-10-CM

## 2021-04-02 DIAGNOSIS — M54.6 PAIN IN THORACIC SPINE: ICD-10-CM

## 2021-04-02 PROCEDURE — 99214 OFFICE O/P EST MOD 30 MIN: CPT | Performed by: NURSE PRACTITIONER

## 2021-04-02 RX ORDER — SODIUM CHLORIDE 0.9 % (FLUSH) 0.9 %
10 SYRINGE (ML) INJECTION EVERY 12 HOURS SCHEDULED
Status: CANCELLED | OUTPATIENT
Start: 2021-04-02

## 2021-04-02 RX ORDER — SODIUM CHLORIDE 0.9 % (FLUSH) 0.9 %
10 SYRINGE (ML) INJECTION AS NEEDED
Status: CANCELLED | OUTPATIENT
Start: 2021-04-02

## 2021-04-02 NOTE — PROGRESS NOTES
CHIEF COMPLAINT:  Mid-Back pain     Subjective   Familia Duke is a 60 y.o. male  who presents to the office for follow-up of procedure.  He completed a Bilateral T9-T11 Thoracic Medial Branch Blockades:   on  3/24/21 performed by Dr. Springer for management of back pain. Patient reports 80% relief from the procedure x 1 day and then his pain began to return.     Today his pain is 4/10VAS in severity. His pain is in his mid back and he describes this as aching and stabbing pain.  He previously completed a Left T9-T11 RFL with 95% relief lasting approximately 7 months.     He completed carpal tunnel surgery on 12/12/20 on the left hand and 1/12/21 on the right hand with Dr. Guido. He notes improved in his bilateral hand pain and weakness.    Patient remained masked during entire encounter. No cough present. I donned a mask and eye protection throughout entire visit. Prior to donning mask and eye protection, hand hygiene was performed, as well as when it was doffed.  I was closer than 6 feet, but not for an extended period of time. No obvious exposure to any bodily fluids.    Back Pain  This is a chronic problem. The current episode started more than 1 year ago. The problem occurs constantly. The problem has been gradually worsening since onset. The pain is present in the thoracic spine. The quality of the pain is described as aching and stabbing. The pain does not radiate. The pain is at a severity of 4/10. The symptoms are aggravated by bending, twisting, standing and position (prolonged walking). Pertinent negatives include no abdominal pain, chest pain, dysuria, fever, headaches, numbness or weakness. Risk factors include obesity, poor posture and lack of exercise. He has tried analgesics, NSAIDs, heat and home exercises (tylenol, aleve) for the symptoms. The treatment provided significant (RFL) relief.      PEG Assessment   What number best describes your pain on average in the past week?3  What number best  "describes how, during the past week, pain has interfered with your enjoyment of life?3  What number best describes how, during the past week, pain has interfered with your general activity?  4    The following portions of the patient's history were reviewed and updated as appropriate: allergies, current medications, past family history, past medical history, past social history, past surgical history and problem list.    Review of Systems   Constitutional: Negative for chills and fever.   Respiratory: Negative for shortness of breath.    Cardiovascular: Negative for chest pain.   Gastrointestinal: Negative for abdominal pain, constipation, diarrhea, nausea and vomiting.   Genitourinary: Negative for difficulty urinating, dysuria and enuresis.   Musculoskeletal: Positive for back pain and neck pain.   Neurological: Negative for dizziness, weakness, light-headedness, numbness and headaches.   Psychiatric/Behavioral: Positive for sleep disturbance. Negative for confusion, hallucinations, self-injury and suicidal ideas. The patient is not nervous/anxious.    All other systems reviewed and are negative.    --  The aforementioned information the Chief Complaint section and above subjective data including any HPI data, and also the Review of Systems data, has been personally reviewed and affirmed.  --     Vitals:    04/02/21 1123   BP: 118/66   Pulse: 73   Resp: 18   Temp: 96.8 °F (36 °C)   SpO2: 97%   Weight: 111 kg (244 lb)   Height: 172.7 cm (68\")   PainSc:   4   PainLoc: Back     Objective   Physical Exam  Vitals and nursing note reviewed.   Constitutional:       Appearance: Normal appearance. He is well-developed.   Eyes:      General: Lids are normal.   Cardiovascular:      Rate and Rhythm: Normal rate.   Pulmonary:      Effort: Pulmonary effort is normal.   Musculoskeletal:      Cervical back: Normal range of motion.      Thoracic back: Tenderness and bony tenderness present.   Neurological:      Mental Status: He " is alert and oriented to person, place, and time.   Psychiatric:         Attention and Perception: Attention normal.         Mood and Affect: Mood normal.         Speech: Speech normal.         Behavior: Behavior normal.         Judgment: Judgment normal.       Assessment/Plan   Diagnoses and all orders for this visit:    1. Thoracic spondylosis without myelopathy (Primary)    2. Pain in thoracic spine    3. Cervical disc disorder with radiculopathy of cervicothoracic region    4. Chronic neck pain    5. Disc displacement, cervicothoracic    6. Facet arthropathy, thoracic      --- Proceed with Bilateral T9-T11 RFL  --------  Education about Radiofrequency Lesioning of Medial Branches:    The medial branch blockade was intended for diagnostic purposes, with the intent of offering the patient Radiofrequency thermal rhizotomy if the MBB is diagnostically effective.  The diagnostic blockade is necessary to determine the likelihood that RF therapy could be efficacious in providing long term relief to the patient.  As indicated above, diagnostic efficacy was established.      In the RF procedure, needles are placed to the joint lines in the same fashion, and after testing, the needle tips are heated to thermally treat the nerves, blocking the nerves by in essence damaging the nerves with the heat treatment.      Medically, a successful RF procedure should provide a patient with 50% pain relief or more for at least 6 months.  We estimate a likelihood of about an 80% chance that medical success will be realized.  We discussed & educated once again that not all patients have a medically successful result, and the patient voices understanding.  However, our clinical experience suggests that successful patients receive relief more in the range of 12 months on average.  (We also discussed that a fortunate minority of patients receive therapeutic success from the MBB, and may not require RF ablation.  If a patient receives more  than 8 weeks of relief from MBB, then occasional repeat MBB for therapeutic purposes is a very reasonable alternative therapy.  This course of therapy is consistent with our LCDs according to our CMS  in the area, and therefore other insurance providers should follow accordingly.  We will monitor our patients to screen for these therapeutic responders and will offer RF therapy only when necessary.  However, in this clinical scenario, this therapeutic result was not realized, and therefore Radiofrequency Lesioning is medically necessary.)      We discussed that MBB & RF are not without risks.  Guidelines regarding anticoagulant use & neuraxial procedures will be respected.  Patients that are ill or otherwise may be at risk for sepsis will not have their spines accessed by neuraxial injections of any type.  This patient will not be offered these therapies if there is an increased risk.   We discussed that there is a risk of postprocedural pain and also a risk of worsening of clinical picture with these procedures as with any neuraxial procedure.  All invasive procedures have risks including but not limited to bleeding, infection, injury, nerve injury, paralysis, coma, death, lack of pain relief, and worsening of clinical picture.      In this education session, all of these topics were covered and the patient voiced understanding.    ---------  --- Follow-up after procedure     MILO REPORT    MILO report has been reviewed and scanned into the patient's chart.    As the clinician, I personally reviewed the MILO from 4/2/2021 while the patient was in the office today.    EMR Dragon/Transcription disclaimer:   Much of this encounter note is an electronic transcription/translation of spoken language to printed text. The electronic translation of spoken language may permit erroneous, or at times, nonsensical words or phrases to be inadvertently transcribed; Although I have reviewed the note for such  errors, some may still exist.

## 2021-05-24 ENCOUNTER — TELEPHONE (OUTPATIENT)
Dept: PAIN MEDICINE | Facility: CLINIC | Age: 61
End: 2021-05-24

## 2021-05-24 NOTE — TELEPHONE ENCOUNTER
Provider: DR STANLEY    Caller: CHRISSY BANKS    Relationship to Patient: SELF    Phone Number: [phone]    Reason for Call: PT WAS SUPPOSED TO HAVE THORACIC BACK ABLATION W/ DR STANLEY BUT INSURANCE DENIED IS, WAS TOLD THAT OFFICE IS GOING TO APPEAL. PT WOULD LIKE TO KNOW WHERE THINGS ARE AT WITH THE APPEAL AND IF HE SHOULD CALL HIS INSURANCE AND DISCUSS WITH THEM AS WELL. PLEASE CALL HIM BACK AT THE NUMBER ABOVE.

## 2021-05-24 NOTE — TELEPHONE ENCOUNTER
PER [facility] PROVIDER SERVICES, APPEAL WAS RECEIVED ON 5/7 AND THERE IS NO UPDATE AT THIS TIME, CALL REF#[id number]    TRIED CALLING PATIENT BACK, NO ANSWER, UNABLE TO LEAVE [facility].

## 2021-06-02 NOTE — PATIENT INSTRUCTIONS
"-------  Education about Medial Branch Blockade and RF Therapy:    This medial branch blockade (MBB) suggested is intended for diagnostic purposes, with the intent of offering the patient Radiofrequency thermal rhizotomy (RF) if the MBB is diagnostically effective.  The diagnostic blockade is necessary to determine the likelihood that RF therapy could be efficacious in providing long term relief to the patient.    Medial branches are sensory nerve branches that connect to a facet joint and transmit sensations & pain signals from that joint.  Facet is a term for the type of joints found in the spine.  Medial branches are the nerves that go to a facet, and therefore are also sometimes called \"facet joint nerves\" (FJNs).      In a medial branch blockade procedure, xray fluoroscopy is used to verify the locations of the outside of the joint lines which are being targeted.  Under xray guidance, needles are placed to these areas.  Contrast dye is injected to confirm proper placement, with dye flowing over the joint area, and to ensure that the dye does not flow into unintended areas such as a vein.  When this is confirmed, local anesthetic is injected to block the medial branch at that joint level.      If MBBs are diagnostically successful in blocking pain, then the patient is most likely a great candidate for Radiofrequency of those facet joint nerves.  In the RF procedure, needles are placed to the joint lines in the same fashion, and after testing, the needle tips are heated to thermally treat the nerves, blocking the nerves by in essence damaging the nerves with the heat treatment.       Medically, a successful RF procedure should provide a patient with 50% pain relief or more for at least 6 months.  Clinical experience suggests that successful patients receive relief more in the range of 12 months on average.  We also discussed that a fortunate minority of patients receive therapeutic success from the MBB, and may " Pre-Op H&P  Jessica Winston  [id number]  1963    Chief complaint: Post-op right hip wound infection    HPI:    Patient is a 57 y.o.female who is s/p conversion to a right total hip arthroplasty on 4/15/21 that subsequently developed a post-operative infection. Surgical intervention is recommended and she is agreeable. She is here today for a right hip superficial irrigation and debridedment.    Review of Systems:  General ROS: negative for chills, fever or skin lesions;  No changes since last office visit.  Neg for recent sick exposure  Cardiovascular ROS: no chest pain; +baseline dyspnea on exertion, +HTN  Respiratory ROS: no cough, shortness of breath, or wheezing; former cigarette smoker (1/2 ppd x 30 years)- quit 4/15/21    Allergies:   Allergies   Allergen Reactions   • Clindamycin/Lincomycin Anaphylaxis and Swelling     took whole dose and then throat started to swell -    • Penicillins Anaphylaxis and Swelling     Tolerated cefazolin 4/15/21   • Codeine Swelling       Home Meds:    No current facility-administered medications on file prior to encounter.     Current Outpatient Medications on File Prior to Encounter   Medication Sig Dispense Refill   • acetaminophen (TYLENOL) 500 MG tablet Take 1,000 mg by mouth Every 6 (Six) Hours As Needed.     • buPROPion SR (WELLBUTRIN SR) 150 MG 12 hr tablet Take 150 mg by mouth Daily.     • cyanocobalamin 1000 MCG/ML injection Inject  into the appropriate muscle as directed by prescriber Every 30 (Thirty) Days. Once per month- usually first of month     • docusate sodium (Colace) 100 MG capsule Take 1 capsule by mouth 2 (Two) Times a Day.     • lisinopril (PRINIVIL,ZESTRIL) 10 MG tablet Take 10 mg by mouth Daily.     • meloxicam (MOBIC) 15 MG tablet Take 15 mg by mouth Daily.     • methocarbamol (ROBAXIN) 750 MG tablet Take 750 mg by mouth Every 6 (Six) Hours As Needed.     • ondansetron (Zofran) 4 MG tablet Take 1 tablet by mouth Every 8 (Eight) Hours As Needed  for Nausea or Vomiting.     • oxyCODONE (Roxicodone) 5 MG immediate release tablet Take 1 tablet by mouth Every 4 (Four) Hours As Needed for Moderate Pain .  0   • predniSONE (DELTASONE) 10 MG tablet Take 10 mg by mouth Daily.     • rOPINIRole (REQUIP) 0.5 MG tablet Take 0.5 mg by mouth Every Night. Take 1 hour before bedtime.     • traMADol (ULTRAM) 50 MG tablet Take 1 tablet by mouth Every 6 (Six) Hours As Needed for Moderate Pain .     • vitamin D (ERGOCALCIFEROL) 1.25 MG (07155 UT) capsule capsule Take 50,000 Units by mouth 1 (One) Time Per Week. Tuesday         PMH:   Past Medical History:   Diagnosis Date   • Avascular necrosis (CMS/HCC)    • Depression    • Fractures    • HTN (hypertension)    • Hyperglycemia    • Hypertension    • Osteoarthritis    • RA (rheumatoid arthritis) (CMS/HCC)     ALSO REPORTS OA   • Tooth loose     top left    • Vitamin D deficiency    • Wears glasses     readers     PSH:    Past Surgical History:   Procedure Laterality Date   • ARM TENDON REPAIR Left    • BREAST SURGERY     • ENDOSCOPY N/A 3/30/2017    Procedure: ESOPHAGOGASTRODUODENOSCOPY WITH COLD FORCEP BIOPSY;  Surgeon: Anca Trevino MD;  Location: [facility] ENDOSCOPY;  Service:    • FEMUR SURGERY      right    • HERNIA REPAIR Bilateral     INGUINAL- unsure about mesh    • KNEE SURGERY Left    • MANDIBLE SURGERY     • TOTAL HIP ARTHROPLASTY REVISION Right 4/15/2021    Procedure: COVERSION TO RIGHT TOTAL HIP ARTHROPLASTY;  Surgeon: Riaz Man MD;  Location: [facility] OR;  Service: Orthopedics;  Laterality: Right;   • TUBAL ABDOMINAL LIGATION     • WISDOM TOOTH EXTRACTION         Social History:   Tobacco:   Social History     Tobacco Use   Smoking Status Current Every Day Smoker   • Packs/day: 0.50   • Years: 30.00   • Pack years: 15.00   • Types: Cigarettes   Smokeless Tobacco Never Used   Tobacco Comment    HX OF SMOKING 1 PPD FOR THE PAST 30 YEARS       Alcohol:     Social History     Substance and Sexual  not require RF ablation.  If a patient receives more than 8 weeks of relief from MBB, then occasional repeat MBB for therapeutic purposes is a very reasonable alternative therapy.  This course of therapy is consistent with our LCDs according to our CMS  in the area, and therefore other insurance providers should follow accordingly.  We will monitor our patients to screen for these therapeutic responders and will offer RF therapy only when necessary.      We discussed that MBB & RF are not without risks.  Guidelines regarding anticoagulant use & neuraxial procedures will be respected.  Patients that are ill or otherwise may be at risk for sepsis will not have their spines accessed by neuraxial injections of any type.  This patient will not be offered these therapies if there is an increased risk.   We discussed that there is a risk of postprocedural pain and also a risk of worsening of clinical picture with these procedures as with any neuraxial procedure.    -------       Activity   Alcohol Use No       Vitals:           /84 (BP Location: Right arm, Patient Position: Sitting)   Pulse 97   Temp 98.7 °F (37.1 °C) (Temporal)   Resp 16   LMP 08/01/2010 (Approximate)   SpO2 100%     Physical Exam:  General Appearance:    Alert, cooperative, no distress, appears stated age   Head:    Normocephalic, without obvious abnormality, atraumatic   Lungs:     Clear to auscultation anterior CHAPARRO, inspiratory wheeze anterior RUL, CTA posteriorly, respirations unlabored    Heart:   Regular rate and rhythm, S1 and S2 normal, no murmur, rub    or gallop    Abdomen:    Soft, non-tender, +bowel sounds   Breast Exam:    deferred   Genitalia:    deferred   Extremities:   BUE and LLE are normal, atraumatic, no cyanosis or edema; right hip with wound vac intact, scant amount of serosanguinous drainage in tubing, mild edema   Skin:   Skin color, texture, turgor normal, no rashes or lesions   Neurologic:   Grossly intact   Results Review  LABS:  Lab Results   Component Value Date    WBC 8.78 04/17/2021    HGB 8.8 (L) 04/17/2021    HCT 27.6 (L) 04/17/2021    MCV 91.7 04/17/2021     04/17/2021    NEUTROABS 9.53 (H) 04/13/2021    GLUCOSE 85 04/17/2021    BUN 9 04/17/2021    CREATININE 0.72 04/17/2021    EGFRIFNONA 83 04/17/2021     04/17/2021    K 3.6 04/17/2021     04/17/2021    CO2 27.0 04/17/2021    CALCIUM 8.5 (L) 04/17/2021    ALBUMIN 3.80 04/13/2021    AST 15 04/13/2021    ALT 11 04/13/2021    BILITOT 0.3 04/13/2021    PTT 24.2 04/13/2021    INR 0.93 04/13/2021       RADIOLOGY:  No radiology results for the last 3 days     I reviewed the patient's new clinical results.    Cancer Staging (if applicable)  Cancer Patient: __ yes _X_no __unknown; If yes, clinical stage T:__ N:__M:__, stage group or __N/A    Impression: s/p conversion to right total hip arthroplasty on 4/15/21 with subsequent wound infection    Plan: Right hip superficial irrigation and debridedment      Yolande SEGURA  DENISE Garner   6/2/2021   10:52 EDT

## 2021-07-07 ENCOUNTER — OFFICE VISIT (OUTPATIENT)
Dept: SPORTS MEDICINE | Facility: CLINIC | Age: 61
End: 2021-07-07

## 2021-07-07 VITALS
WEIGHT: 244 LBS | HEART RATE: 72 BPM | RESPIRATION RATE: 16 BRPM | BODY MASS INDEX: 36.98 KG/M2 | TEMPERATURE: 97.3 F | DIASTOLIC BLOOD PRESSURE: 80 MMHG | OXYGEN SATURATION: 97 % | HEIGHT: 68 IN | SYSTOLIC BLOOD PRESSURE: 128 MMHG

## 2021-07-07 DIAGNOSIS — N18.2 STAGE 2 CHRONIC KIDNEY DISEASE: Chronic | ICD-10-CM

## 2021-07-07 DIAGNOSIS — E78.2 MIXED HYPERLIPIDEMIA: ICD-10-CM

## 2021-07-07 DIAGNOSIS — R73.03 PREDIABETES: Primary | ICD-10-CM

## 2021-07-07 DIAGNOSIS — F41.9 ANXIETY: ICD-10-CM

## 2021-07-07 PROCEDURE — 99214 OFFICE O/P EST MOD 30 MIN: CPT | Performed by: FAMILY MEDICINE

## 2021-07-07 NOTE — PROGRESS NOTES
"Familia is a 60 y.o. year old male    Chief Complaint   Patient presents with   • med check     Here today to follow up on labs.    F/u multiple medical problems    History of Present Illness   HPI   F/u HLD and pre-dm. Diet has been mostly controlled. Losing some weight with working outdoors.  Joint pains are better with hydroxychloroquine from rheumatology.   Anxiety remains well controlled.     Review of Systems    /80 (BP Location: Left arm, Patient Position: Sitting, Cuff Size: Adult)   Pulse 72   Temp 97.3 °F (36.3 °C)   Resp 16   Ht 172.7 cm (68\")   Wt 111 kg (244 lb)   SpO2 97%   BMI 37.10 kg/m²      Physical Exam  Vitals reviewed.   Constitutional:       Appearance: Normal appearance. He is not ill-appearing.   Pulmonary:      Effort: Pulmonary effort is normal.   Psychiatric:         Mood and Affect: Mood normal.         Behavior: Behavior normal.         Thought Content: Thought content normal.         Judgment: Judgment normal.           Current Outpatient Medications:   •  acetaminophen (TYLENOL) 650 MG 8 hr tablet, Take 2 tablets by mouth Every 8 (Eight) Hours., Disp: , Rfl:   •  escitalopram (LEXAPRO) 20 MG tablet, TAKE 1 TABLET BY MOUTH DAILY, Disp: 90 tablet, Rfl: 3  •  hydroxychloroquine (PLAQUENIL) 200 MG tablet, Take 200 mg by mouth Daily., Disp: , Rfl:   •  naproxen sodium (ALEVE) 220 MG tablet, Take 220 mg by mouth 2 (Two) Times a Day As Needed., Disp: , Rfl:   •  omeprazole (priLOSEC) 20 MG capsule, Take 20 mg by mouth Daily., Disp: , Rfl:   •  rosuvastatin (CRESTOR) 40 MG tablet, TAKE 1 TABLET BY MOUTH DAILY, Disp: 90 tablet, Rfl: 3  •  tamsulosin (FLOMAX) 0.4 MG capsule 24 hr capsule, Take 1 capsule by mouth., Disp: , Rfl:   •  TiZANidine (ZANAFLEX) 2 MG capsule, Take 1 capsule by mouth 3 (Three) Times a Day As Needed for Muscle Spasms., Disp: 90 capsule, Rfl: 0     Diagnoses and all orders for this visit:    Prediabetes  -     Hemoglobin A1c  -     Comprehensive Metabolic " Panel  -     Lipid Panel With / Chol / HDL Ratio  -     Vitamin D 25 Hydroxy    Mixed hyperlipidemia  -     Hemoglobin A1c  -     Comprehensive Metabolic Panel  -     Lipid Panel With / Chol / HDL Ratio  -     Vitamin D 25 Hydroxy    Anxiety  -     Hemoglobin A1c  -     Comprehensive Metabolic Panel  -     Lipid Panel With / Chol / HDL Ratio  -     Vitamin D 25 Hydroxy    Stage 2 chronic kidney disease  -     Hemoglobin A1c  -     Comprehensive Metabolic Panel  -     Lipid Panel With / Chol / HDL Ratio  -     Vitamin D 25 Hydroxy       Encouraged continued healthy diet and exercise.  Encourage continued physical activity as tolerated, discussed thresholds based on his joint pains.  Consider water-based exercise.  Currently he is doing well with outdoor work around his house and actually feels better after that.  Continue current medication plan.

## 2021-07-08 LAB
25(OH)D3+25(OH)D2 SERPL-MCNC: 29.3 NG/ML (ref 30–100)
ALBUMIN SERPL-MCNC: 4.8 G/DL (ref 3.5–5.2)
ALBUMIN/GLOB SERPL: 2.1 G/DL
ALP SERPL-CCNC: 69 U/L (ref 39–117)
ALT SERPL-CCNC: 21 U/L (ref 1–41)
AST SERPL-CCNC: 18 U/L (ref 1–40)
BILIRUB SERPL-MCNC: 0.6 MG/DL (ref 0–1.2)
BUN SERPL-MCNC: 14 MG/DL (ref 8–23)
BUN/CREAT SERPL: 11.7 (ref 7–25)
CALCIUM SERPL-MCNC: 9.7 MG/DL (ref 8.6–10.5)
CHLORIDE SERPL-SCNC: 105 MMOL/L (ref 98–107)
CHOLEST SERPL-MCNC: 97 MG/DL (ref 0–200)
CHOLEST/HDLC SERPL: 3.88 {RATIO}
CO2 SERPL-SCNC: 26.5 MMOL/L (ref 22–29)
CREAT SERPL-MCNC: 1.2 MG/DL (ref 0.76–1.27)
GLOBULIN SER CALC-MCNC: 2.3 GM/DL
GLUCOSE SERPL-MCNC: 109 MG/DL (ref 65–99)
HBA1C MFR BLD: 5.6 % (ref 4.8–5.6)
HDLC SERPL-MCNC: 25 MG/DL (ref 40–60)
LDLC SERPL CALC-MCNC: 43 MG/DL (ref 0–100)
POTASSIUM SERPL-SCNC: 4.7 MMOL/L (ref 3.5–5.2)
PROT SERPL-MCNC: 7.1 G/DL (ref 6–8.5)
SODIUM SERPL-SCNC: 141 MMOL/L (ref 136–145)
TRIGL SERPL-MCNC: 176 MG/DL (ref 0–150)
VLDLC SERPL CALC-MCNC: 29 MG/DL (ref 5–40)

## 2021-07-08 RX ORDER — OFLOXACIN 3 MG/ML
SOLUTION/ DROPS OPHTHALMIC
Status: ON HOLD | COMMUNITY
Start: 2021-05-04 | End: 2021-12-13

## 2021-07-08 RX ORDER — PREDNISOLONE ACETATE 10 MG/ML
SUSPENSION/ DROPS OPHTHALMIC
Status: ON HOLD | COMMUNITY
Start: 2021-05-07 | End: 2021-12-13

## 2021-10-13 ENCOUNTER — OFFICE VISIT (OUTPATIENT)
Dept: SPORTS MEDICINE | Facility: CLINIC | Age: 61
End: 2021-10-13

## 2021-10-13 DIAGNOSIS — J06.9 UPPER RESPIRATORY TRACT INFECTION, UNSPECIFIED TYPE: Primary | ICD-10-CM

## 2021-10-13 PROCEDURE — 99442 PR PHYS/QHP TELEPHONE EVALUATION 11-20 MIN: CPT | Performed by: FAMILY MEDICINE

## 2021-10-13 RX ORDER — AZITHROMYCIN 250 MG/1
TABLET, FILM COATED ORAL
Qty: 6 TABLET | Refills: 0 | Status: ON HOLD | OUTPATIENT
Start: 2021-10-13 | End: 2021-12-13

## 2021-10-13 NOTE — PROGRESS NOTES
Telephone Visit Note    Chief Complaint   Patient presents with   • Cough         History of Present Illness  Sore throat and drainage, assoc with fatigue, started Monday evening. Cough is mild, more throat than chest. No sick contacts. Not covid vaccinated.       Diagnoses and all orders for this visit:    Upper respiratory tract infection, unspecified type  -     azithromycin (Zithromax Z-Jean-Pierre) 250 MG tablet; Take 2 tablets the first day, then 1 tablet daily for 4 days.      This sounds unlikely to be Covid but recommend testing for additional precautions due to being nonvaccinated and risk factors.  Discussed eligibility for monoclonal antibodies.  He will isolate until he has results.    This patient was evaluated by telephone due to current precautions with COVID-19.  Consent to telephone visit for this problem was given by the patient. I spent a total of 13 minutes on the phone with the patient.

## 2021-12-08 ENCOUNTER — PRE-ADMISSION TESTING (OUTPATIENT)
Dept: PREADMISSION TESTING | Facility: HOSPITAL | Age: 61
End: 2021-12-08

## 2021-12-08 VITALS
TEMPERATURE: 98.4 F | HEIGHT: 68 IN | OXYGEN SATURATION: 98 % | DIASTOLIC BLOOD PRESSURE: 71 MMHG | SYSTOLIC BLOOD PRESSURE: 118 MMHG | WEIGHT: 239 LBS | HEART RATE: 66 BPM | RESPIRATION RATE: 16 BRPM | BODY MASS INDEX: 36.22 KG/M2

## 2021-12-08 LAB
ANION GAP SERPL CALCULATED.3IONS-SCNC: 8.1 MMOL/L (ref 5–15)
BUN SERPL-MCNC: 10 MG/DL (ref 8–23)
BUN/CREAT SERPL: 8.5 (ref 7–25)
CALCIUM SPEC-SCNC: 9.6 MG/DL (ref 8.6–10.5)
CHLORIDE SERPL-SCNC: 104 MMOL/L (ref 98–107)
CO2 SERPL-SCNC: 27.9 MMOL/L (ref 22–29)
CREAT SERPL-MCNC: 1.18 MG/DL (ref 0.76–1.27)
DEPRECATED RDW RBC AUTO: 45.2 FL (ref 37–54)
ERYTHROCYTE [DISTWIDTH] IN BLOOD BY AUTOMATED COUNT: 13.4 % (ref 12.3–15.4)
GFR SERPL CREATININE-BSD FRML MDRD: 62 ML/MIN/1.73
GLUCOSE SERPL-MCNC: 108 MG/DL (ref 65–99)
HCT VFR BLD AUTO: 43.3 % (ref 37.5–51)
HGB BLD-MCNC: 14.8 G/DL (ref 13–17.7)
MCH RBC QN AUTO: 31.4 PG (ref 26.6–33)
MCHC RBC AUTO-ENTMCNC: 34.2 G/DL (ref 31.5–35.7)
MCV RBC AUTO: 91.9 FL (ref 79–97)
PLATELET # BLD AUTO: 145 10*3/MM3 (ref 140–450)
PMV BLD AUTO: 10.4 FL (ref 6–12)
POTASSIUM SERPL-SCNC: 4.2 MMOL/L (ref 3.5–5.2)
QT INTERVAL: 456 MS
RBC # BLD AUTO: 4.71 10*6/MM3 (ref 4.14–5.8)
SODIUM SERPL-SCNC: 140 MMOL/L (ref 136–145)
WBC NRBC COR # BLD: 6.28 10*3/MM3 (ref 3.4–10.8)

## 2021-12-08 PROCEDURE — 36415 COLL VENOUS BLD VENIPUNCTURE: CPT

## 2021-12-08 PROCEDURE — 93005 ELECTROCARDIOGRAM TRACING: CPT

## 2021-12-08 PROCEDURE — 80048 BASIC METABOLIC PNL TOTAL CA: CPT

## 2021-12-08 PROCEDURE — 85027 COMPLETE CBC AUTOMATED: CPT

## 2021-12-08 PROCEDURE — 93010 ELECTROCARDIOGRAM REPORT: CPT | Performed by: INTERNAL MEDICINE

## 2021-12-08 RX ORDER — CHLORAL HYDRATE 500 MG
1000 CAPSULE ORAL
COMMUNITY
End: 2021-12-15 | Stop reason: HOSPADM

## 2021-12-08 NOTE — DISCHARGE INSTRUCTIONS
Take the following medications the morning of surgery with a small sip of water:    NONE    ARRIVE TO MAIN SURGERY AT 6 AM ON 12/13/2021    If you are on prescription narcotic pain medication to control your pain you may also take that medication the morning of surgery.    General Instructions:  • Do not eat or drink anything after midnight the night before surgery.  • Infants may have breast milk up to four hours before surgery.  • Infants drinking formula may drink formula up to six hours before surgery.   • Patients who avoid smoking, chewing tobacco and alcohol for 4 weeks prior to surgery have a reduced risk of post-operative complications.  Quit smoking as many days before surgery as you can.  • Do not smoke, use chewing tobacco or drink alcohol the day of surgery.   • If applicable bring your C-PAP/ BI-PAP machine.  • Bring any papers given to you in the doctor’s office.  • Wear clean comfortable clothes.  • Do not wear contact lenses, false eyelashes or make-up.  Bring a case for your glasses.   • Bring crutches or walker if applicable.  • Remove all piercings.  Leave jewelry and any other valuables at home.  • Hair extensions with metal clips must be removed prior to surgery.  • The Pre-Admission Testing nurse will instruct you to bring medications if unable to obtain an accurate list in Pre-Admission Testing.          Preventing a Surgical Site Infection:  • For 2 to 3 days before surgery, avoid shaving with a razor because the razor can irritate skin and make it easier to develop an infection.    • Any areas of open skin can increase the risk of a post-operative wound infection by allowing bacteria to enter and travel throughout the body.  Notify your surgeon if you have any skin wounds / rashes even if it is not near the expected surgical site.  The area will need assessed to determine if surgery should be delayed until it is healed.  • The night prior to surgery shower using a fresh bar of  anti-bacterial soap (such as Dial) and clean washcloth.  Sleep in a clean bed with clean clothing.  Do not allow pets to sleep with you.  • Shower on the morning of surgery using a fresh bar of anti-bacterial soap (such as Dial) and clean washcloth.  Dry with a clean towel and dress in clean clothing.  • Ask your surgeon if you will be receiving antibiotics prior to surgery.  • Make sure you, your family, and all healthcare providers clean their hands with soap and water or an alcohol based hand  before caring for you or your wound.    Day of surgery:  Your arrival time is approximately two hours before your scheduled surgery time.  Upon arrival, a Pre-op nurse and Anesthesiologist will review your health history, obtain vital signs, and answer questions you may have.  The only belongings needed at this time will be your home medications and if applicable your C-PAP/BI-PAP machine.  A Pre-op nurse will start an IV and you may receive medication in preparation for surgery, including something to help you relax.      Please be aware that surgery does come with discomfort.  We want to make every effort to control your discomfort so please discuss any uncontrolled symptoms with your nurse.   Your doctor will most likely have prescribed pain medications.      If you are going home after surgery you will receive individualized written care instructions before being discharged.  A responsible adult must drive you to and from the hospital on the day of your surgery and stay with you for 24 hours.  Discharge prescriptions can be filled by the hospital pharmacy during regular pharmacy hours.  If you are having surgery late in the day/evening your prescription may be e-prescribed to your pharmacy.  Please verify your pharmacy hours or chose a 24 hour pharmacy to avoid not having access to your prescription because your pharmacy has closed for the day.    If you are staying overnight following surgery, you will be  transported to your hospital room following the recovery period.  [facility] has all private rooms.    If you have any questions please call Pre-Admission Testing at [phone].  Deductibles and co-payments are collected on the day of service. Please be prepared to pay the required co-pay, deductible or deposit on the day of service as defined by your plan.    Patient Education for Self-Quarantine Process    • Following your COVID testing, we strongly recommend that you wear a mask when you are with other people and practice social distancing.   • Limit your activities to only required outings.  • Wash your hands with soap and water frequently for at least 20 seconds.   • Avoid touching your eyes, nose and mouth with unwashed hands.  • Do not share anything - utensils, drinking glasses, food from the same bowl.   • Sanitize household surfaces daily. Include all high touch areas (door handles, light switches, phones, countertops, etc.)    Call your surgeon immediately if you experience any of the following symptoms:  • Sore Throat  • Shortness of Breath or difficulty breathing  • Cough  • Chills  • Body soreness or muscle pain  • Headache  • Fever  • New loss of taste or smell  • Do not arrive for your surgery ill.  Your procedure will need to be rescheduled to another time.  You will need to call your physician before the day of surgery to avoid any unnecessary exposure to hospital staff as well as other patients.

## 2021-12-10 ENCOUNTER — LAB (OUTPATIENT)
Dept: LAB | Facility: HOSPITAL | Age: 61
End: 2021-12-10

## 2021-12-10 DIAGNOSIS — Z01.818 OTHER SPECIFIED PRE-OPERATIVE EXAMINATION: Primary | ICD-10-CM

## 2021-12-10 LAB — SARS-COV-2 ORF1AB RESP QL NAA+PROBE: NOT DETECTED

## 2021-12-10 PROCEDURE — C9803 HOPD COVID-19 SPEC COLLECT: HCPCS

## 2021-12-10 PROCEDURE — U0004 COV-19 TEST NON-CDC HGH THRU: HCPCS

## 2021-12-13 ENCOUNTER — HOSPITAL ENCOUNTER (OUTPATIENT)
Facility: HOSPITAL | Age: 61
Discharge: HOME | End: 2021-12-15
Attending: UROLOGY | Admitting: UROLOGY

## 2021-12-13 ENCOUNTER — ANESTHESIA EVENT (OUTPATIENT)
Dept: PERIOP | Facility: HOSPITAL | Age: 61
End: 2021-12-13

## 2021-12-13 ENCOUNTER — ANESTHESIA (OUTPATIENT)
Dept: PERIOP | Facility: HOSPITAL | Age: 61
End: 2021-12-13

## 2021-12-13 DIAGNOSIS — N40.0 BPH (BENIGN PROSTATIC HYPERPLASIA): ICD-10-CM

## 2021-12-13 DIAGNOSIS — N40.1 BENIGN PROSTATIC HYPERPLASIA WITH INCOMPLETE BLADDER EMPTYING: Primary | ICD-10-CM

## 2021-12-13 DIAGNOSIS — R31.0 GROSS HEMATURIA: ICD-10-CM

## 2021-12-13 DIAGNOSIS — R39.14 BENIGN PROSTATIC HYPERPLASIA WITH INCOMPLETE BLADDER EMPTYING: Primary | ICD-10-CM

## 2021-12-13 PROCEDURE — 25010000002 HYDROMORPHONE PER 4 MG: Performed by: NURSE ANESTHETIST, CERTIFIED REGISTERED

## 2021-12-13 PROCEDURE — 88305 TISSUE EXAM BY PATHOLOGIST: CPT | Performed by: UROLOGY

## 2021-12-13 PROCEDURE — 25010000002 DEXAMETHASONE PER 1 MG: Performed by: NURSE ANESTHETIST, CERTIFIED REGISTERED

## 2021-12-13 PROCEDURE — 25010000002 MIDAZOLAM PER 1 MG: Performed by: ANESTHESIOLOGY

## 2021-12-13 PROCEDURE — 25010000002 FENTANYL CITRATE (PF) 50 MCG/ML SOLUTION: Performed by: NURSE ANESTHETIST, CERTIFIED REGISTERED

## 2021-12-13 PROCEDURE — 25010000002 HYDROMORPHONE PER 4 MG: Performed by: UROLOGY

## 2021-12-13 PROCEDURE — 0 CEFAZOLIN IN DEXTROSE 2-4 GM/100ML-% SOLUTION: Performed by: UROLOGY

## 2021-12-13 PROCEDURE — 25010000002 ONDANSETRON PER 1 MG: Performed by: NURSE ANESTHETIST, CERTIFIED REGISTERED

## 2021-12-13 PROCEDURE — G0378 HOSPITAL OBSERVATION PER HR: HCPCS

## 2021-12-13 PROCEDURE — 25010000002 PROPOFOL 10 MG/ML EMULSION: Performed by: NURSE ANESTHETIST, CERTIFIED REGISTERED

## 2021-12-13 RX ORDER — IPRATROPIUM BROMIDE AND ALBUTEROL SULFATE 2.5; .5 MG/3ML; MG/3ML
3 SOLUTION RESPIRATORY (INHALATION) ONCE AS NEEDED
Status: DISCONTINUED | OUTPATIENT
Start: 2021-12-13 | End: 2021-12-13 | Stop reason: HOSPADM

## 2021-12-13 RX ORDER — SODIUM CHLORIDE 0.9 % (FLUSH) 0.9 %
3 SYRINGE (ML) INJECTION EVERY 12 HOURS SCHEDULED
Status: DISCONTINUED | OUTPATIENT
Start: 2021-12-13 | End: 2021-12-13 | Stop reason: HOSPADM

## 2021-12-13 RX ORDER — FENTANYL CITRATE 50 UG/ML
50 INJECTION, SOLUTION INTRAMUSCULAR; INTRAVENOUS
Status: DISCONTINUED | OUTPATIENT
Start: 2021-12-13 | End: 2021-12-13 | Stop reason: HOSPADM

## 2021-12-13 RX ORDER — ACETAMINOPHEN 325 MG/1
650 TABLET ORAL EVERY 4 HOURS PRN
Status: DISCONTINUED | OUTPATIENT
Start: 2021-12-13 | End: 2021-12-15 | Stop reason: HOSPADM

## 2021-12-13 RX ORDER — ATROPA BELLADONNA AND OPIUM 16.2; 3 MG/1; MG/1
SUPPOSITORY RECTAL AS NEEDED
Status: DISCONTINUED | OUTPATIENT
Start: 2021-12-13 | End: 2021-12-13 | Stop reason: HOSPADM

## 2021-12-13 RX ORDER — IBUPROFEN 600 MG/1
600 TABLET ORAL ONCE AS NEEDED
Status: DISCONTINUED | OUTPATIENT
Start: 2021-12-13 | End: 2021-12-13 | Stop reason: HOSPADM

## 2021-12-13 RX ORDER — FENTANYL CITRATE 50 UG/ML
INJECTION, SOLUTION INTRAMUSCULAR; INTRAVENOUS AS NEEDED
Status: DISCONTINUED | OUTPATIENT
Start: 2021-12-13 | End: 2021-12-13 | Stop reason: SURG

## 2021-12-13 RX ORDER — SODIUM CHLORIDE 9 MG/ML
100 INJECTION, SOLUTION INTRAVENOUS CONTINUOUS
Status: DISCONTINUED | OUTPATIENT
Start: 2021-12-13 | End: 2021-12-15 | Stop reason: HOSPADM

## 2021-12-13 RX ORDER — LIDOCAINE HYDROCHLORIDE 20 MG/ML
INJECTION, SOLUTION INFILTRATION; PERINEURAL AS NEEDED
Status: DISCONTINUED | OUTPATIENT
Start: 2021-12-13 | End: 2021-12-13 | Stop reason: SURG

## 2021-12-13 RX ORDER — OXYCODONE AND ACETAMINOPHEN 7.5; 325 MG/1; MG/1
1 TABLET ORAL EVERY 4 HOURS PRN
Status: DISCONTINUED | OUTPATIENT
Start: 2021-12-13 | End: 2021-12-13 | Stop reason: HOSPADM

## 2021-12-13 RX ORDER — SODIUM CHLORIDE 0.9 % (FLUSH) 0.9 %
3 SYRINGE (ML) INJECTION EVERY 12 HOURS SCHEDULED
Status: DISCONTINUED | OUTPATIENT
Start: 2021-12-13 | End: 2021-12-15 | Stop reason: HOSPADM

## 2021-12-13 RX ORDER — EPHEDRINE SULFATE 50 MG/ML
5 INJECTION, SOLUTION INTRAVENOUS ONCE AS NEEDED
Status: DISCONTINUED | OUTPATIENT
Start: 2021-12-13 | End: 2021-12-13 | Stop reason: HOSPADM

## 2021-12-13 RX ORDER — HYDROCODONE BITARTRATE AND ACETAMINOPHEN 7.5; 325 MG/1; MG/1
1 TABLET ORAL ONCE AS NEEDED
Status: COMPLETED | OUTPATIENT
Start: 2021-12-13 | End: 2021-12-13

## 2021-12-13 RX ORDER — NALOXONE HCL 0.4 MG/ML
0.1 VIAL (ML) INJECTION
Status: DISCONTINUED | OUTPATIENT
Start: 2021-12-13 | End: 2021-12-15 | Stop reason: HOSPADM

## 2021-12-13 RX ORDER — PROMETHAZINE HYDROCHLORIDE 25 MG/1
25 TABLET ORAL ONCE AS NEEDED
Status: DISCONTINUED | OUTPATIENT
Start: 2021-12-13 | End: 2021-12-13 | Stop reason: HOSPADM

## 2021-12-13 RX ORDER — ONDANSETRON 4 MG/1
4 TABLET, FILM COATED ORAL EVERY 6 HOURS PRN
Status: DISCONTINUED | OUTPATIENT
Start: 2021-12-13 | End: 2021-12-15 | Stop reason: HOSPADM

## 2021-12-13 RX ORDER — HYDRALAZINE HYDROCHLORIDE 20 MG/ML
5 INJECTION INTRAMUSCULAR; INTRAVENOUS
Status: DISCONTINUED | OUTPATIENT
Start: 2021-12-13 | End: 2021-12-13 | Stop reason: HOSPADM

## 2021-12-13 RX ORDER — DIPHENHYDRAMINE HYDROCHLORIDE 50 MG/ML
12.5 INJECTION INTRAMUSCULAR; INTRAVENOUS
Status: DISCONTINUED | OUTPATIENT
Start: 2021-12-13 | End: 2021-12-13 | Stop reason: HOSPADM

## 2021-12-13 RX ORDER — FLUMAZENIL 0.1 MG/ML
0.2 INJECTION INTRAVENOUS AS NEEDED
Status: DISCONTINUED | OUTPATIENT
Start: 2021-12-13 | End: 2021-12-13 | Stop reason: HOSPADM

## 2021-12-13 RX ORDER — MAGNESIUM HYDROXIDE 1200 MG/15ML
LIQUID ORAL AS NEEDED
Status: DISCONTINUED | OUTPATIENT
Start: 2021-12-13 | End: 2021-12-13 | Stop reason: HOSPADM

## 2021-12-13 RX ORDER — HYDROMORPHONE HYDROCHLORIDE 1 MG/ML
0.5 INJECTION, SOLUTION INTRAMUSCULAR; INTRAVENOUS; SUBCUTANEOUS
Status: DISCONTINUED | OUTPATIENT
Start: 2021-12-13 | End: 2021-12-13 | Stop reason: HOSPADM

## 2021-12-13 RX ORDER — PROMETHAZINE HYDROCHLORIDE 25 MG/1
25 SUPPOSITORY RECTAL ONCE AS NEEDED
Status: DISCONTINUED | OUTPATIENT
Start: 2021-12-13 | End: 2021-12-13 | Stop reason: HOSPADM

## 2021-12-13 RX ORDER — ROSUVASTATIN CALCIUM 40 MG/1
40 TABLET, COATED ORAL DAILY
Status: DISCONTINUED | OUTPATIENT
Start: 2021-12-13 | End: 2021-12-15 | Stop reason: HOSPADM

## 2021-12-13 RX ORDER — ONDANSETRON 2 MG/ML
4 INJECTION INTRAMUSCULAR; INTRAVENOUS EVERY 6 HOURS PRN
Status: DISCONTINUED | OUTPATIENT
Start: 2021-12-13 | End: 2021-12-15 | Stop reason: HOSPADM

## 2021-12-13 RX ORDER — CEFAZOLIN SODIUM 2 G/100ML
2 INJECTION, SOLUTION INTRAVENOUS EVERY 8 HOURS
Status: COMPLETED | OUTPATIENT
Start: 2021-12-13 | End: 2021-12-14

## 2021-12-13 RX ORDER — MIDAZOLAM HYDROCHLORIDE 1 MG/ML
1 INJECTION INTRAMUSCULAR; INTRAVENOUS
Status: COMPLETED | OUTPATIENT
Start: 2021-12-13 | End: 2021-12-13

## 2021-12-13 RX ORDER — OXYCODONE AND ACETAMINOPHEN 7.5; 325 MG/1; MG/1
1 TABLET ORAL EVERY 4 HOURS PRN
Status: DISCONTINUED | OUTPATIENT
Start: 2021-12-13 | End: 2021-12-15 | Stop reason: HOSPADM

## 2021-12-13 RX ORDER — HYDROMORPHONE HYDROCHLORIDE 1 MG/ML
0.5 INJECTION, SOLUTION INTRAMUSCULAR; INTRAVENOUS; SUBCUTANEOUS
Status: DISCONTINUED | OUTPATIENT
Start: 2021-12-13 | End: 2021-12-15 | Stop reason: HOSPADM

## 2021-12-13 RX ORDER — ONDANSETRON 2 MG/ML
4 INJECTION INTRAMUSCULAR; INTRAVENOUS ONCE AS NEEDED
Status: COMPLETED | OUTPATIENT
Start: 2021-12-13 | End: 2021-12-13

## 2021-12-13 RX ORDER — NALOXONE HCL 0.4 MG/ML
0.2 VIAL (ML) INJECTION AS NEEDED
Status: DISCONTINUED | OUTPATIENT
Start: 2021-12-13 | End: 2021-12-13 | Stop reason: HOSPADM

## 2021-12-13 RX ORDER — HYDROMORPHONE HCL 110MG/55ML
PATIENT CONTROLLED ANALGESIA SYRINGE INTRAVENOUS AS NEEDED
Status: DISCONTINUED | OUTPATIENT
Start: 2021-12-13 | End: 2021-12-13 | Stop reason: SURG

## 2021-12-13 RX ORDER — SODIUM CHLORIDE 0.9 % (FLUSH) 0.9 %
10 SYRINGE (ML) INJECTION AS NEEDED
Status: DISCONTINUED | OUTPATIENT
Start: 2021-12-13 | End: 2021-12-15 | Stop reason: HOSPADM

## 2021-12-13 RX ORDER — DIPHENHYDRAMINE HCL 25 MG
25 CAPSULE ORAL
Status: DISCONTINUED | OUTPATIENT
Start: 2021-12-13 | End: 2021-12-13 | Stop reason: HOSPADM

## 2021-12-13 RX ORDER — PROPOFOL 10 MG/ML
VIAL (ML) INTRAVENOUS AS NEEDED
Status: DISCONTINUED | OUTPATIENT
Start: 2021-12-13 | End: 2021-12-13 | Stop reason: SURG

## 2021-12-13 RX ORDER — LABETALOL HYDROCHLORIDE 5 MG/ML
5 INJECTION, SOLUTION INTRAVENOUS
Status: DISCONTINUED | OUTPATIENT
Start: 2021-12-13 | End: 2021-12-13 | Stop reason: HOSPADM

## 2021-12-13 RX ORDER — SODIUM CHLORIDE 0.9 % (FLUSH) 0.9 %
3-10 SYRINGE (ML) INJECTION AS NEEDED
Status: DISCONTINUED | OUTPATIENT
Start: 2021-12-13 | End: 2021-12-13 | Stop reason: HOSPADM

## 2021-12-13 RX ORDER — ESCITALOPRAM OXALATE 20 MG/1
20 TABLET ORAL DAILY
Status: DISCONTINUED | OUTPATIENT
Start: 2021-12-13 | End: 2021-12-15 | Stop reason: HOSPADM

## 2021-12-13 RX ORDER — LIDOCAINE HYDROCHLORIDE 10 MG/ML
0.5 INJECTION, SOLUTION EPIDURAL; INFILTRATION; INTRACAUDAL; PERINEURAL ONCE AS NEEDED
Status: DISCONTINUED | OUTPATIENT
Start: 2021-12-13 | End: 2021-12-13 | Stop reason: HOSPADM

## 2021-12-13 RX ORDER — SODIUM CHLORIDE, SODIUM LACTATE, POTASSIUM CHLORIDE, CALCIUM CHLORIDE 600; 310; 30; 20 MG/100ML; MG/100ML; MG/100ML; MG/100ML
9 INJECTION, SOLUTION INTRAVENOUS CONTINUOUS
Status: DISCONTINUED | OUTPATIENT
Start: 2021-12-13 | End: 2021-12-13

## 2021-12-13 RX ORDER — FAMOTIDINE 10 MG/ML
20 INJECTION, SOLUTION INTRAVENOUS ONCE
Status: COMPLETED | OUTPATIENT
Start: 2021-12-13 | End: 2021-12-13

## 2021-12-13 RX ORDER — ONDANSETRON 2 MG/ML
INJECTION INTRAMUSCULAR; INTRAVENOUS AS NEEDED
Status: DISCONTINUED | OUTPATIENT
Start: 2021-12-13 | End: 2021-12-13 | Stop reason: SURG

## 2021-12-13 RX ORDER — PANTOPRAZOLE SODIUM 40 MG/1
40 TABLET, DELAYED RELEASE ORAL EVERY MORNING
Status: DISCONTINUED | OUTPATIENT
Start: 2021-12-13 | End: 2021-12-15 | Stop reason: HOSPADM

## 2021-12-13 RX ORDER — DEXAMETHASONE SODIUM PHOSPHATE 10 MG/ML
INJECTION INTRAMUSCULAR; INTRAVENOUS AS NEEDED
Status: DISCONTINUED | OUTPATIENT
Start: 2021-12-13 | End: 2021-12-13 | Stop reason: SURG

## 2021-12-13 RX ORDER — AMOXICILLIN 250 MG
2 CAPSULE ORAL NIGHTLY
Status: DISCONTINUED | OUTPATIENT
Start: 2021-12-13 | End: 2021-12-15 | Stop reason: HOSPADM

## 2021-12-13 RX ORDER — CEFAZOLIN SODIUM 2 G/100ML
2 INJECTION, SOLUTION INTRAVENOUS ONCE
Status: COMPLETED | OUTPATIENT
Start: 2021-12-13 | End: 2021-12-13

## 2021-12-13 RX ORDER — ATROPA BELLADONNA AND OPIUM 16.2; 3 MG/1; MG/1
30 SUPPOSITORY RECTAL EVERY 8 HOURS PRN
Status: DISCONTINUED | OUTPATIENT
Start: 2021-12-13 | End: 2021-12-15 | Stop reason: HOSPADM

## 2021-12-13 RX ADMIN — ESCITALOPRAM 20 MG: 20 TABLET, FILM COATED ORAL at 15:02

## 2021-12-13 RX ADMIN — HYDROMORPHONE HYDROCHLORIDE 0.5 MG: 1 INJECTION, SOLUTION INTRAMUSCULAR; INTRAVENOUS; SUBCUTANEOUS at 11:30

## 2021-12-13 RX ADMIN — SODIUM CHLORIDE, PRESERVATIVE FREE 3 ML: 5 INJECTION INTRAVENOUS at 20:43

## 2021-12-13 RX ADMIN — FENTANYL CITRATE 50 MCG: 0.05 INJECTION, SOLUTION INTRAMUSCULAR; INTRAVENOUS at 08:15

## 2021-12-13 RX ADMIN — OXYCODONE HYDROCHLORIDE AND ACETAMINOPHEN 1 TABLET: 7.5; 325 TABLET ORAL at 11:31

## 2021-12-13 RX ADMIN — ATROPA BELLADONNA AND OPIUM 30 MG: 16.2; 3 SUPPOSITORY RECTAL at 23:43

## 2021-12-13 RX ADMIN — HYDROMORPHONE HYDROCHLORIDE 0.5 MG: 2 INJECTION, SOLUTION INTRAMUSCULAR; INTRAVENOUS; SUBCUTANEOUS at 08:34

## 2021-12-13 RX ADMIN — FENTANYL CITRATE 50 MCG: 0.05 INJECTION, SOLUTION INTRAMUSCULAR; INTRAVENOUS at 08:29

## 2021-12-13 RX ADMIN — FENTANYL CITRATE 50 MCG: 50 INJECTION INTRAMUSCULAR; INTRAVENOUS at 09:42

## 2021-12-13 RX ADMIN — HYDROMORPHONE HYDROCHLORIDE 0.5 MG: 1 INJECTION, SOLUTION INTRAMUSCULAR; INTRAVENOUS; SUBCUTANEOUS at 18:27

## 2021-12-13 RX ADMIN — SODIUM CHLORIDE, PRESERVATIVE FREE 3 ML: 5 INJECTION INTRAVENOUS at 14:16

## 2021-12-13 RX ADMIN — SODIUM CHLORIDE, POTASSIUM CHLORIDE, SODIUM LACTATE AND CALCIUM CHLORIDE 9 ML/HR: 600; 310; 30; 20 INJECTION, SOLUTION INTRAVENOUS at 06:58

## 2021-12-13 RX ADMIN — PROPOFOL 200 MG: 10 INJECTION, EMULSION INTRAVENOUS at 08:20

## 2021-12-13 RX ADMIN — ONDANSETRON 4 MG: 2 INJECTION INTRAMUSCULAR; INTRAVENOUS at 09:01

## 2021-12-13 RX ADMIN — HYDROMORPHONE HYDROCHLORIDE 0.5 MG: 1 INJECTION, SOLUTION INTRAMUSCULAR; INTRAVENOUS; SUBCUTANEOUS at 10:15

## 2021-12-13 RX ADMIN — HYDROMORPHONE HYDROCHLORIDE 0.5 MG: 1 INJECTION, SOLUTION INTRAMUSCULAR; INTRAVENOUS; SUBCUTANEOUS at 09:37

## 2021-12-13 RX ADMIN — MIDAZOLAM 1 MG: 1 INJECTION INTRAMUSCULAR; INTRAVENOUS at 07:46

## 2021-12-13 RX ADMIN — PANTOPRAZOLE SODIUM 40 MG: 40 TABLET, DELAYED RELEASE ORAL at 15:02

## 2021-12-13 RX ADMIN — DOCUSATE SODIUM 50MG AND SENNOSIDES 8.6MG 2 TABLET: 8.6; 5 TABLET, FILM COATED ORAL at 20:33

## 2021-12-13 RX ADMIN — OXYCODONE HYDROCHLORIDE AND ACETAMINOPHEN 1 TABLET: 7.5; 325 TABLET ORAL at 21:13

## 2021-12-13 RX ADMIN — SODIUM CHLORIDE 100 ML/HR: 9 INJECTION, SOLUTION INTRAVENOUS at 15:05

## 2021-12-13 RX ADMIN — FENTANYL CITRATE 50 MCG: 50 INJECTION INTRAMUSCULAR; INTRAVENOUS at 09:28

## 2021-12-13 RX ADMIN — DEXAMETHASONE SODIUM PHOSPHATE 10 MG: 10 INJECTION INTRAMUSCULAR; INTRAVENOUS at 08:24

## 2021-12-13 RX ADMIN — HYDROMORPHONE HYDROCHLORIDE 0.5 MG: 1 INJECTION, SOLUTION INTRAMUSCULAR; INTRAVENOUS; SUBCUTANEOUS at 14:15

## 2021-12-13 RX ADMIN — FENTANYL CITRATE 50 MCG: 50 INJECTION INTRAMUSCULAR; INTRAVENOUS at 10:03

## 2021-12-13 RX ADMIN — HYDROMORPHONE HYDROCHLORIDE 0.5 MG: 1 INJECTION, SOLUTION INTRAMUSCULAR; INTRAVENOUS; SUBCUTANEOUS at 23:43

## 2021-12-13 RX ADMIN — FAMOTIDINE 20 MG: 10 INJECTION INTRAVENOUS at 06:58

## 2021-12-13 RX ADMIN — OXYCODONE HYDROCHLORIDE AND ACETAMINOPHEN 1 TABLET: 7.5; 325 TABLET ORAL at 16:52

## 2021-12-13 RX ADMIN — HYDROMORPHONE HYDROCHLORIDE 0.5 MG: 1 INJECTION, SOLUTION INTRAMUSCULAR; INTRAVENOUS; SUBCUTANEOUS at 09:54

## 2021-12-13 RX ADMIN — LIDOCAINE HYDROCHLORIDE 100 MG: 20 INJECTION, SOLUTION INFILTRATION; PERINEURAL at 08:20

## 2021-12-13 RX ADMIN — CEFAZOLIN SODIUM 2 G: 2 INJECTION, SOLUTION INTRAVENOUS at 15:02

## 2021-12-13 RX ADMIN — ONDANSETRON 4 MG: 2 INJECTION INTRAMUSCULAR; INTRAVENOUS at 09:30

## 2021-12-13 RX ADMIN — CEFAZOLIN SODIUM 2 G: 2 INJECTION, SOLUTION INTRAVENOUS at 23:43

## 2021-12-13 RX ADMIN — HYDROMORPHONE HYDROCHLORIDE 0.5 MG: 1 INJECTION, SOLUTION INTRAMUSCULAR; INTRAVENOUS; SUBCUTANEOUS at 20:33

## 2021-12-13 RX ADMIN — MIDAZOLAM 1 MG: 1 INJECTION INTRAMUSCULAR; INTRAVENOUS at 06:59

## 2021-12-13 RX ADMIN — CEFAZOLIN SODIUM 2 G: 2 INJECTION, SOLUTION INTRAVENOUS at 08:03

## 2021-12-13 RX ADMIN — ROSUVASTATIN CALCIUM 40 MG: 40 TABLET, FILM COATED ORAL at 15:02

## 2021-12-13 RX ADMIN — HYDROCODONE BITARTRATE AND ACETAMINOPHEN 1 TABLET: 7.5; 325 TABLET ORAL at 10:24

## 2021-12-13 NOTE — ANESTHESIA PROCEDURE NOTES
Airway  Urgency: elective    Date/Time: 12/13/2021 8:22 AM  Airway not difficult    General Information and Staff    Patient location during procedure: OR  Anesthesiologist: Haris Muir MD  CRNA: Marita Jim CRNA    Indications and Patient Condition  Indications for airway management: airway protection    Preoxygenated: yes  MILS maintained throughout  Mask difficulty assessment: 0 - not attempted    Final Airway Details  Final airway type: supraglottic airway      Successful airway: classic  Size 5    Number of attempts at approach: 1  Assessment: lips, teeth, and gum same as pre-op and atraumatic intubation

## 2021-12-13 NOTE — ANESTHESIA PREPROCEDURE EVALUATION
Anesthesia Evaluation     Patient summary reviewed and Nursing notes reviewed   no history of anesthetic complications:  NPO Solid Status: > 8 hours  NPO Liquid Status: > 8 hours           Airway   Mallampati: II  Dental - normal exam     Pulmonary - normal exam   (+) sleep apnea,   Cardiovascular - normal exam    (+) hyperlipidemia,       Neuro/Psych  (+) headaches, syncope, numbness, psychiatric history Anxiety,     GI/Hepatic/Renal/Endo    (+) obesity,  GERD,  renal disease CRI,     Musculoskeletal     (+) neck pain,   Abdominal    Substance History      OB/GYN          Other   arthritis,                      Anesthesia Plan    ASA 3     general     intravenous induction     Anesthetic plan, all risks, benefits, and alternatives have been provided, discussed and informed consent has been obtained with: patient.

## 2021-12-13 NOTE — ANESTHESIA POSTPROCEDURE EVALUATION
"Patient: Familia [facility]    Procedure Summary     Date: 12/13/21 Room / Location: [facility] OR  / [facility] MAIN OR    Anesthesia Start: 0811 Anesthesia Stop: 0926    Procedure: TRANSURETHRAL RESECTION OF PROSTATE (N/A ) Diagnosis:       BPH with obstruction/lower urinary tract symptoms      Gross hematuria      (BPH with bladder outlet obstruction and gross hematuria)    Surgeons: John Hodges MD Provider: Haris Muir MD    Anesthesia Type: general ASA Status: 3          Anesthesia Type: general    Vitals  Vitals Value Taken Time   /66 12/13/21 1106   Temp 36.4 °C (97.6 °F) 12/13/21 0925   Pulse 81 12/13/21 1108   Resp 16 12/13/21 0925   SpO2 91 % 12/13/21 1108   Vitals shown include unvalidated device data.        Post Anesthesia Care and Evaluation    Patient location during evaluation: PACU  Patient participation: complete - patient participated  Level of consciousness: awake and alert  Pain management: adequate  Airway patency: patent  Anesthetic complications: No anesthetic complications    Cardiovascular status: acceptable  Respiratory status: acceptable  Hydration status: acceptable    Comments: /69 (BP Location: Right arm, Patient Position: Lying)   Pulse 80   Temp 36.4 °C (97.6 °F) (Oral)   Resp 18   Ht 172.7 cm (68\")   Wt 109 kg (239 lb 10.2 oz)   SpO2 97%   BMI 36.44 kg/m²       "

## 2021-12-13 NOTE — OP NOTE
CYSTOSCOPY TRANSURETHRAL RESECTION OF PROSTATE  Procedure Note    Familia Duke  12/13/2021    Pre-op Diagnosis:   BPH with bladder outlet obstruction and gross hematuria    Post-op Diagnosis:     Post-Op Diagnosis Codes:     * BPH with obstruction/lower urinary tract symptoms [N40.1, N13.8]     * Gross hematuria [R31.0]    Procedure(s):  TRANSURETHRAL RESECTION OF PROSTATE    Surgeon(s):  John Hodges MD    Anesthesia: General    Staff:   Circulator: Dang Perez RN; Alyssa Ngo RN; Pushpa Hodges RN  Scrub Person: Zhangbrittaneytani Kurtis    Estimated Blood Loss: 200ml    Specimens:                Order Name Source Comment Collection Info Order Time   TISSUE PATHOLOGY EXAM Prostate  Collected By: John Hodges MD 12/13/2021  8:44 AM     Release to patient   Immediate              Drains:   Continuous Bladder Irrigation 24 Fr (Active)       [REMOVED] Urethral Catheter Silicone 24 Fr. (Removed)       Findings: Trilobar hyperplasia with a very prominent median lobe.  Lateral lobe enlargement as well.  Moderate bladder trabeculation    Complications: None apparent    Indications: 61-year-old gentleman with bladder outlet obstruction and recurrent gross hematuria now presents for TURP.  He is failed medical management.  He is got a very large median lobe.    Procedure: Patient was taken to the operative suite given general anesthesia.  Placed in lithotomy.  Prepped and draped in a sterile fashion.  Surgical timeout was performed.  Antibiotics confirmed.  The resectoscope was placed.  Examination showed moderate trabeculation of the bladder.  The orifices were identified and well away from the bladder neck.  I could lift up the median lobe and identified these readily.  Using the continuous flow bipolar resectoscope the median lobe was resected first all the way down to the bladder neck now this widely opened up the bladder neck.  We then began resecting the lateral lobes in a [place]  fashion all the way to the level of the verumontanum getting down to capsular resection fibers on each side.  We got into some large volume of bleeding on his anatomical left side which were eventually able to get under control just a single large vessel.  No other bleeding issues throughout the case.  All the chips irrigated clear.  I inspected the prostatic urethra 1 more time to sure of any missed vessels and lidocaine was placed in his urethra.  A 24 Nepali Lindsey three-way catheter was placed to continuous irrigation.  BNO suppository placed in his rectum.  He was awoken taken to recovery in stable addition.      John Hodges MD     Date: 12/13/2021  Time: 09:22 EST

## 2021-12-13 NOTE — H&P
[facility] Surgical History and Physical    Patient Care Team:  Khurram Kunz MD as PCP - General (Sports Medicine)    Chief complaint gross hematuria    Subjective     Patient is a 61 y.o. male presents with gross hematuria and obstructed voiding symptoms with failure on meds.     Review of Systems   Pertinent items are noted in HPI    Past Medical History:   Diagnosis Date   • Anesthesia complication     following block for shoulder surgery the numbing med affected lungs and he had to spend night for observation till he could breath again   • Anxiety    • Arthritis    • BPH (benign prostatic hyperplasia)    • Chronic back pain    • GERD (gastroesophageal reflux disease)    • Hemorrhoid    • History of compression fracture of spine     thoracic area   • Hyperlipidemia    • Injury of plantar plate of left foot 12/04/2014    Plantar plate rupture had surgery   • Migraine    • Sleep apnea     CPAP   • Spinal stenosis    • Vitreous detachment      Past Surgical History:   Procedure Laterality Date   • BACK SURGERY N/A 1982    Dr. Walter, L4-L5   • CARPAL TUNNEL RELEASE Left    • COLONOSCOPY N/A     normal colonoscopy   • COLONOSCOPY N/A 10/31/2018    Procedure: COLONOSCOPY  TO CECUM/TI;  Surgeon: Yomi Wiley MD;  Location: [facility] ENDOSCOPY;  Service: General   • EYE SURGERY      CATARACTS   • FOOT OSTEOTOMY W/ PLANTAR FASCIA RELEASE Left 12/04/2014    PLANTAR PLATE REPAIR 2ND, 3RD, & 4TH DIGITS, EXTENSOR TENDON LENGTHING 2ND, 3RD & 4TH DIGITS, WEIL OSTEOTOMY 2ND, 3RD & 4TH DIGITS-Dr. Azael Schilling DPM    • FOOT SURGERY Left 2017   • KNEE SURGERY Bilateral 1987, 1992    scope   • LUMBAR LAMINECTOMY DISCECTOMY DECOMPRESSION Bilateral 9/6/2019    Procedure: LUMBAR DECOMPRESSION, open bilateral lumbar decompression L3 to S1;  Surgeon: Brandon Velasquez MD;  Location: [facility] MAIN OR;  Service: Neurosurgery   • MEDIAL BRANCH BLOCK Bilateral 3/24/2021    Procedure: Bilateral MEDIAL BRANCH BLOCK at  approximately T9-T11;  Surgeon: Javon Springer MD;  Location: [facility] [place] MAIN OR;  Service: Pain Management;  Laterality: Bilateral;   • SHOULDER ARTHROSCOPY W/ ACROMIAL REPAIR Right 09/04/2015    Shoulder arthroscopy with SLAP and rotator cuff debridement, subacromial decompression with acromioplasty, DCE and mini open biceps tenodesis, one Arthrex 6.25 mm Biocomposite Swivelock Tenodesis Screw-Dr. Ten Bryant    • SHOULDER ARTHROSCOPY W/ ACROMIAL REPAIR Left 01/20/2017    Shoulder arthroscopy with SLAP/labral/rotator cuff debridement, subacromial decompression with acromioplasty, DCE, chondroplasty and biceps tenodesis, one Arthrex 6.25 mm Biocomposite Swivelock Tenodesis Screw-Dr. Ten Bryant    • SHOULDER DEBRIDEMENT Left 03/09/2017    Scar revision of a 3.5 centimeter incision with irrigation and debridement, which was an excisional debridement of subcutaneous tissue and a small area of the muscle-Dr. Ten Bryant    • UPPER GASTROINTESTINAL ENDOSCOPY N/A      Family History   Problem Relation Age of Onset   • Pancreatic cancer Mother    • Cancer Father         Sarcoma   • Breast cancer Sister    • Lymphoma Brother    • Other Brother 53        accident   • No Known Problems Maternal Grandmother    • No Known Problems Maternal Grandfather    • No Known Problems Paternal Grandmother    • No Known Problems Paternal Grandfather    • Malig Hyperthermia Neg Hx      Social History     Tobacco Use   • Smoking status: Never Smoker   • Smokeless tobacco: Never Used   Vaping Use   • Vaping Use: Never used   Substance Use Topics   • Alcohol use: No   • Drug use: No       Meds:  Medications Prior to Admission   Medication Sig Dispense Refill Last Dose   • acetaminophen (TYLENOL) 650 MG 8 hr tablet Take 2 tablets by mouth Every 8 (Eight) Hours.   Past Month at Unknown time   • escitalopram (LEXAPRO) 20 MG tablet TAKE 1 TABLET BY MOUTH DAILY 90 tablet 3 12/12/2021 at 0800   • hydroxychloroquine (PLAQUENIL) 200 MG tablet Take  "200 mg by mouth Daily.   12/12/2021 at 0800   • Multiple Vitamins-Minerals (ZINC PO) Take 1 tablet by mouth Daily.   12/12/2021 at 0800   • naproxen sodium (ALEVE) 220 MG tablet Take 220 mg by mouth 2 (Two) Times a Day As Needed.   Past Month at Unknown time   • Omega-3 Fatty Acids (fish oil) 1000 MG capsule capsule Take 1,000 mg by mouth Daily With Breakfast.   12/12/2021 at 0800   • omeprazole (priLOSEC) 20 MG capsule Take 20 mg by mouth Daily.   12/12/2021 at 0800   • rosuvastatin (CRESTOR) 40 MG tablet TAKE 1 TABLET BY MOUTH DAILY 90 tablet 3 12/12/2021 at 0800   • tamsulosin (FLOMAX) 0.4 MG capsule 24 hr capsule Take 1 capsule by mouth 2 (Two) Times a Day.   12/12/2021 at 0800       Allergies:  Patient has no known allergies.    Debilities:  none    Objective     Vital Signs  Temp:  [98.5 °F (36.9 °C)] 98.5 °F (36.9 °C)  Heart Rate:  [59-66] 66  Resp:  [16-18] 16  BP: (122)/(68) 122/68  No intake or output data in the 24 hours ending 12/13/21 0806  Flowsheet Rows      First Filed Value   Admission Height 172.7 cm (68\") Documented at 12/13/2021 0633   Admission Weight 109 kg (239 lb 10.2 oz) Documented at 12/13/2021 0633           Physical Exam:     General Appearance:    Alert, cooperative, NAD   HEENT:    No trauma, pupils reactive, hearing intact   Back:     No CVA tenderness   Lungs:     Respirations unlabored, no wheezing    Heart:    RRR, intact peripheral pulses   Abdomen:     Soft, NDNT, no masses, no guarding   :    Phallus nl   Extremities:   No edema, no deformity   Lymphatic:   No neck or groin LAD   Skin:   No bleeding, bruising or rashes   Neuro/Psych:   Orientation intact, mood/affect pleasant, no focal findings     Results Review:    I reviewed the patient's new clinical results.  Results for orders placed or performed in visit on 12/10/21   COVID-19,APTIMA PANTHER(ANGELITO),[facility], NP/OP SWAB IN UTM/VTM/[facility] TRANSPORT MEDIA,24 HR TAT - Swab, Nasopharynx    Specimen: Nasopharynx; Swab   Result " Value Ref Range    COVID19 Not Detected Not Detected - Ref. Range        Assessment:  BPH with LUTS    Plan:    Cystoscopy TURP    I discussed the patient's findings and my recommendations with patient.   Risks, complications, outcomes and alternatives discussed with the patient at the bedside and office.    John Hodges MD  12/13/21  08:06 EST

## 2021-12-14 LAB
ANION GAP SERPL CALCULATED.3IONS-SCNC: 7.3 MMOL/L (ref 5–15)
BUN SERPL-MCNC: 12 MG/DL (ref 8–23)
BUN/CREAT SERPL: 12.5 (ref 7–25)
CALCIUM SPEC-SCNC: 8.7 MG/DL (ref 8.6–10.5)
CHLORIDE SERPL-SCNC: 104 MMOL/L (ref 98–107)
CO2 SERPL-SCNC: 25.7 MMOL/L (ref 22–29)
CREAT SERPL-MCNC: 0.96 MG/DL (ref 0.76–1.27)
DEPRECATED RDW RBC AUTO: 41.9 FL (ref 37–54)
ERYTHROCYTE [DISTWIDTH] IN BLOOD BY AUTOMATED COUNT: 12.8 % (ref 12.3–15.4)
GFR SERPL CREATININE-BSD FRML MDRD: 79 ML/MIN/1.73
GLUCOSE SERPL-MCNC: 184 MG/DL (ref 65–99)
HCT VFR BLD AUTO: 36.3 % (ref 37.5–51)
HGB BLD-MCNC: 12.4 G/DL (ref 13–17.7)
LAB AP CASE REPORT: NORMAL
LAB AP DIAGNOSIS COMMENT: NORMAL
MCH RBC QN AUTO: 30.9 PG (ref 26.6–33)
MCHC RBC AUTO-ENTMCNC: 34.2 G/DL (ref 31.5–35.7)
MCV RBC AUTO: 90.5 FL (ref 79–97)
PATH REPORT.FINAL DX SPEC: NORMAL
PATH REPORT.GROSS SPEC: NORMAL
PLATELET # BLD AUTO: 157 10*3/MM3 (ref 140–450)
PMV BLD AUTO: 10.9 FL (ref 6–12)
POTASSIUM SERPL-SCNC: 4.5 MMOL/L (ref 3.5–5.2)
RBC # BLD AUTO: 4.01 10*6/MM3 (ref 4.14–5.8)
SODIUM SERPL-SCNC: 137 MMOL/L (ref 136–145)
WBC NRBC COR # BLD: 12.83 10*3/MM3 (ref 3.4–10.8)

## 2021-12-14 PROCEDURE — 63710000001 ONDANSETRON PER 8 MG: Performed by: UROLOGY

## 2021-12-14 PROCEDURE — 80048 BASIC METABOLIC PNL TOTAL CA: CPT | Performed by: UROLOGY

## 2021-12-14 PROCEDURE — 85027 COMPLETE CBC AUTOMATED: CPT | Performed by: UROLOGY

## 2021-12-14 PROCEDURE — G0378 HOSPITAL OBSERVATION PER HR: HCPCS

## 2021-12-14 PROCEDURE — 25010000002 HYDROMORPHONE PER 4 MG: Performed by: UROLOGY

## 2021-12-14 RX ORDER — CHOLECALCIFEROL (VITAMIN D3) 125 MCG
5 CAPSULE ORAL NIGHTLY PRN
Status: DISCONTINUED | OUTPATIENT
Start: 2021-12-14 | End: 2021-12-15 | Stop reason: HOSPADM

## 2021-12-14 RX ADMIN — OXYCODONE HYDROCHLORIDE AND ACETAMINOPHEN 1 TABLET: 7.5; 325 TABLET ORAL at 19:35

## 2021-12-14 RX ADMIN — ONDANSETRON HYDROCHLORIDE 4 MG: 4 TABLET, FILM COATED ORAL at 21:00

## 2021-12-14 RX ADMIN — ESCITALOPRAM 20 MG: 20 TABLET, FILM COATED ORAL at 09:28

## 2021-12-14 RX ADMIN — PANTOPRAZOLE SODIUM 40 MG: 40 TABLET, DELAYED RELEASE ORAL at 05:12

## 2021-12-14 RX ADMIN — OXYCODONE HYDROCHLORIDE AND ACETAMINOPHEN 1 TABLET: 7.5; 325 TABLET ORAL at 01:38

## 2021-12-14 RX ADMIN — HYDROMORPHONE HYDROCHLORIDE 0.5 MG: 1 INJECTION, SOLUTION INTRAMUSCULAR; INTRAVENOUS; SUBCUTANEOUS at 12:16

## 2021-12-14 RX ADMIN — SODIUM CHLORIDE, PRESERVATIVE FREE 3 ML: 5 INJECTION INTRAVENOUS at 09:00

## 2021-12-14 RX ADMIN — OXYCODONE HYDROCHLORIDE AND ACETAMINOPHEN 1 TABLET: 7.5; 325 TABLET ORAL at 09:28

## 2021-12-14 RX ADMIN — ATROPA BELLADONNA AND OPIUM 30 MG: 16.2; 3 SUPPOSITORY RECTAL at 13:34

## 2021-12-14 RX ADMIN — HYDROMORPHONE HYDROCHLORIDE 0.5 MG: 1 INJECTION, SOLUTION INTRAMUSCULAR; INTRAVENOUS; SUBCUTANEOUS at 05:12

## 2021-12-14 RX ADMIN — HYDROMORPHONE HYDROCHLORIDE 0.5 MG: 1 INJECTION, SOLUTION INTRAMUSCULAR; INTRAVENOUS; SUBCUTANEOUS at 16:20

## 2021-12-14 RX ADMIN — SODIUM CHLORIDE, PRESERVATIVE FREE 3 ML: 5 INJECTION INTRAVENOUS at 22:44

## 2021-12-14 RX ADMIN — OXYCODONE HYDROCHLORIDE AND ACETAMINOPHEN 1 TABLET: 7.5; 325 TABLET ORAL at 23:47

## 2021-12-14 RX ADMIN — HYDROMORPHONE HYDROCHLORIDE 0.5 MG: 1 INJECTION, SOLUTION INTRAMUSCULAR; INTRAVENOUS; SUBCUTANEOUS at 07:21

## 2021-12-14 RX ADMIN — Medication 5 MG: at 22:44

## 2021-12-14 RX ADMIN — SODIUM CHLORIDE 100 ML/HR: 9 INJECTION, SOLUTION INTRAVENOUS at 16:38

## 2021-12-14 RX ADMIN — DOCUSATE SODIUM 50MG AND SENNOSIDES 8.6MG 2 TABLET: 8.6; 5 TABLET, FILM COATED ORAL at 20:59

## 2021-12-14 RX ADMIN — ROSUVASTATIN CALCIUM 40 MG: 40 TABLET, FILM COATED ORAL at 09:28

## 2021-12-14 NOTE — CASE MANAGEMENT/SOCIAL WORK
Discharge Planning Assessment  [facility]     Patient Name: Familia Duke  MRN: [id number]  Today's Date: 12/14/2021    Admit Date: 12/13/2021     Discharge Needs Assessment     Row Name 12/14/21 1247       Living Environment    Lives With spouse    Current Living Arrangements home/apartment/condo    Potentially Unsafe Housing Conditions other (see comments)  no concerns    Primary Care Provided by self    Provides Primary Care For no one    Family Caregiver if Needed spouse    Quality of Family Relationships helpful; involved; supportive    Able to Return to Prior Arrangements yes       Resource/Environmental Concerns    Resource/Environmental Concerns none    Transportation Concerns car, none       Transition Planning    Patient/Family Anticipates Transition to home with family    Patient/Family Anticipated Services at Transition none    Transportation Anticipated family or friend will provide       Discharge Needs Assessment    Readmission Within the Last 30 Days no previous admission in last 30 days    Equipment Currently Used at Home none    Concerns to be Addressed no discharge needs identified; denies needs/concerns at this time    Anticipated Changes Related to Illness none    Equipment Needed After Discharge none               Discharge Plan     Row Name 12/14/21 124       Plan    Plan Home with spouse    Plan Comments CCP spoke with patient. CCP role explained and discharge planning discussed. Face sheet verified and patient's PCP is Dr. Kunz. Patient lives with his wife. Patient is independent with his ADLs and does not use DME. Patient has used home health but could not recall which agency. Patient denies any SNF history. Patient plans to return home at [email or web address]. CCP will follow for any needs to arise. Melina WHITE              Continued Care and Services - Admitted Since 12/13/2021    Coordination has not been started for this encounter.          Demographic Summary     Row Name 12/14/21  1247       General Information    Admission Type observation    Arrived From emergency department    Referral Source admission list    Reason for Consult discharge planning    Preferred Language English     Used During This Interaction no               Functional Status     Row Name 12/14/21 1247       Functional Status    Usual Activity Tolerance good    Current Activity Tolerance good       Functional Status, IADL    Medications independent    Meal Preparation independent    Housekeeping independent    Laundry independent    Shopping independent       Mental Status    General Appearance WDL WDL       Mental Status Summary    Recent Changes in Mental Status/Cognitive Functioning no changes               Psychosocial    No documentation.                Abuse/Neglect    No documentation.                Legal    No documentation.                Substance Abuse    No documentation.                Patient Forms    No documentation.                   CINDY Moreno

## 2021-12-14 NOTE — PLAN OF CARE
Goal Outcome Evaluation:  Plan of Care Reviewed With: patient        Progress: improving    Pt continued with CBI. Urine remains pink tinged with periodic clots that have passed on their own. Irrigation has not been necessary thus far. Pt walked the hallway and appeared steady. Pt reports no gas or BM yet. Pt wore CPAP mask to sleep, but did not require supplemental 02 while awake. VSS. Pain controlled with Percocet and Dilaudid. Will continue to monitor.
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
## 2021-12-14 NOTE — PLAN OF CARE
Goal Outcome Evaluation:  Plan of Care Reviewed With: patient        Progress: improving  Pt AxOx4, calm and cooperative, VSS. De Dios with CBI going at moderate rate, urine is pink, getting more clear as the day progresses. Pt tolerating regular diet. PRN dilaudid given x2 for pain control, PRN percocet given x1. SCDs on. Spouse at bedside, attentive to pt and participating in pt's care.  RN will continue to monitor.

## 2021-12-15 ENCOUNTER — TELEPHONE (OUTPATIENT)
Dept: SPORTS MEDICINE | Facility: CLINIC | Age: 61
End: 2021-12-15

## 2021-12-15 VITALS
BODY MASS INDEX: 36.32 KG/M2 | HEART RATE: 75 BPM | RESPIRATION RATE: 14 BRPM | SYSTOLIC BLOOD PRESSURE: 110 MMHG | TEMPERATURE: 98.3 F | HEIGHT: 68 IN | WEIGHT: 239.64 LBS | DIASTOLIC BLOOD PRESSURE: 60 MMHG | OXYGEN SATURATION: 94 %

## 2021-12-15 LAB
ANION GAP SERPL CALCULATED.3IONS-SCNC: 4.7 MMOL/L (ref 5–15)
BUN SERPL-MCNC: 13 MG/DL (ref 8–23)
BUN/CREAT SERPL: 13.4 (ref 7–25)
CALCIUM SPEC-SCNC: 8.3 MG/DL (ref 8.6–10.5)
CHLORIDE SERPL-SCNC: 107 MMOL/L (ref 98–107)
CO2 SERPL-SCNC: 28.3 MMOL/L (ref 22–29)
CREAT SERPL-MCNC: 0.97 MG/DL (ref 0.76–1.27)
DEPRECATED RDW RBC AUTO: 44.3 FL (ref 37–54)
ERYTHROCYTE [DISTWIDTH] IN BLOOD BY AUTOMATED COUNT: 13.1 % (ref 12.3–15.4)
GFR SERPL CREATININE-BSD FRML MDRD: 78 ML/MIN/1.73
GLUCOSE SERPL-MCNC: 171 MG/DL (ref 65–99)
HCT VFR BLD AUTO: 34.5 % (ref 37.5–51)
HGB BLD-MCNC: 11.5 G/DL (ref 13–17.7)
MCH RBC QN AUTO: 31.1 PG (ref 26.6–33)
MCHC RBC AUTO-ENTMCNC: 33.3 G/DL (ref 31.5–35.7)
MCV RBC AUTO: 93.2 FL (ref 79–97)
PLATELET # BLD AUTO: 116 10*3/MM3 (ref 140–450)
PMV BLD AUTO: 10.6 FL (ref 6–12)
POTASSIUM SERPL-SCNC: 4.3 MMOL/L (ref 3.5–5.2)
RBC # BLD AUTO: 3.7 10*6/MM3 (ref 4.14–5.8)
SODIUM SERPL-SCNC: 140 MMOL/L (ref 136–145)
WBC NRBC COR # BLD: 9.08 10*3/MM3 (ref 3.4–10.8)

## 2021-12-15 PROCEDURE — 25010000002 HYDROMORPHONE PER 4 MG: Performed by: UROLOGY

## 2021-12-15 PROCEDURE — G0378 HOSPITAL OBSERVATION PER HR: HCPCS

## 2021-12-15 PROCEDURE — 85027 COMPLETE CBC AUTOMATED: CPT | Performed by: UROLOGY

## 2021-12-15 PROCEDURE — 80048 BASIC METABOLIC PNL TOTAL CA: CPT | Performed by: UROLOGY

## 2021-12-15 RX ORDER — AMOXICILLIN 250 MG
2 CAPSULE ORAL DAILY
Qty: 60 TABLET | Refills: 1 | Status: SHIPPED | OUTPATIENT
Start: 2021-12-15 | End: 2022-01-13

## 2021-12-15 RX ORDER — OXYCODONE AND ACETAMINOPHEN 7.5; 325 MG/1; MG/1
1 TABLET ORAL EVERY 4 HOURS PRN
Qty: 30 TABLET | Refills: 0 | Status: SHIPPED | OUTPATIENT
Start: 2021-12-15 | End: 2021-12-22

## 2021-12-15 RX ORDER — BISACODYL 10 MG
10 SUPPOSITORY, RECTAL RECTAL DAILY
Status: DISCONTINUED | OUTPATIENT
Start: 2021-12-15 | End: 2021-12-15 | Stop reason: HOSPADM

## 2021-12-15 RX ORDER — SULFAMETHOXAZOLE AND TRIMETHOPRIM 800; 160 MG/1; MG/1
1 TABLET ORAL 2 TIMES DAILY
Qty: 28 TABLET | Refills: 0 | Status: SHIPPED | OUTPATIENT
Start: 2021-12-15 | End: 2022-01-13

## 2021-12-15 RX ADMIN — OXYCODONE HYDROCHLORIDE AND ACETAMINOPHEN 1 TABLET: 7.5; 325 TABLET ORAL at 20:56

## 2021-12-15 RX ADMIN — BISACODYL 10 MG: 10 SUPPOSITORY RECTAL at 12:09

## 2021-12-15 RX ADMIN — OXYCODONE HYDROCHLORIDE AND ACETAMINOPHEN 1 TABLET: 7.5; 325 TABLET ORAL at 12:09

## 2021-12-15 RX ADMIN — ATROPA BELLADONNA AND OPIUM 30 MG: 16.2; 3 SUPPOSITORY RECTAL at 07:44

## 2021-12-15 RX ADMIN — PANTOPRAZOLE SODIUM 40 MG: 40 TABLET, DELAYED RELEASE ORAL at 06:44

## 2021-12-15 RX ADMIN — SODIUM CHLORIDE, PRESERVATIVE FREE 3 ML: 5 INJECTION INTRAVENOUS at 07:46

## 2021-12-15 RX ADMIN — DOCUSATE SODIUM 50MG AND SENNOSIDES 8.6MG 2 TABLET: 8.6; 5 TABLET, FILM COATED ORAL at 20:56

## 2021-12-15 RX ADMIN — ATROPA BELLADONNA AND OPIUM 30 MG: 16.2; 3 SUPPOSITORY RECTAL at 18:30

## 2021-12-15 RX ADMIN — ROSUVASTATIN CALCIUM 40 MG: 40 TABLET, FILM COATED ORAL at 07:45

## 2021-12-15 RX ADMIN — OXYCODONE HYDROCHLORIDE AND ACETAMINOPHEN 1 TABLET: 7.5; 325 TABLET ORAL at 04:23

## 2021-12-15 RX ADMIN — HYDROMORPHONE HYDROCHLORIDE 0.5 MG: 1 INJECTION, SOLUTION INTRAMUSCULAR; INTRAVENOUS; SUBCUTANEOUS at 18:34

## 2021-12-15 RX ADMIN — ESCITALOPRAM 20 MG: 20 TABLET, FILM COATED ORAL at 07:45

## 2021-12-15 RX ADMIN — SODIUM CHLORIDE 100 ML/HR: 9 INJECTION, SOLUTION INTRAVENOUS at 04:23
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
## 2021-12-15 NOTE — TELEPHONE ENCOUNTER
[facility] [place] called stating they never got the patients surgery clearance form. I told her that it was faxed on 12/10/21 and we did receive confirmation.   I told her I will print it out and fax it again.   Fax #:[phone]    Received confirmation on 12/15/21 at 12:04pm.

## 2021-12-16 NOTE — CASE MANAGEMENT/SOCIAL WORK
Case Management Discharge Note      Final Note: Home----no needs         Selected Continued Care - Discharged on 12/15/2021 Admission date: 12/13/2021 - Discharge disposition: Home or Self Care    Destination    No services have been selected for the patient.              Durable Medical Equipment    No services have been selected for the patient.              Dialysis/Infusion    No services have been selected for the patient.              Home Medical Care    No services have been selected for the patient.              Therapy    No services have been selected for the patient.              Community Resources    No services have been selected for the patient.              Community & DME    No services have been selected for the patient.                       Final Discharge Disposition Code: 01 - home or self-care

## 2021-12-16 NOTE — PLAN OF CARE
Goal Outcome Evaluation:  Plan of Care Reviewed With: patient, spouse        Progress: improving  Outcome Summary: A&O VSS chavez DC this morning around 12:45, by 16:00 pt had only voided 50 ml x 1. Bladder scan showed 109 ml.  At 1800 pt had only voided 50 ml x 1, bladder scanned pt which showed 532 ml. Paged Dr. Hodges, Dr. Morales returned call stated to put if chavez and cancel DC. DC orders were not yet placed and Dr. Hodges returned call as soon as the call with Dr. Morales was ended. Dr. Hodges stated that he would like the pt to go home with chavez catheter and to be seen in office for removal in a few days. Nickolas pt did have belladona suppository today and Dr. Hodges stated that this could be why the pt was unable to urinate. Inserted chavez, and irrigated. Educated the wife and pt on how to irrigate if needed. Gave pt a leg bag and sterile water for irrigation with kit. Removed IV with catheter intact and pt was wheeled down to entrance D.

## 2021-12-18 NOTE — DISCHARGE SUMMARY
Date of Discharge:  12/15/2021    Discharge Diagnosis: Benign prostatic hyperplasia with bladder outlet obstruction    Presenting Problem/History of Present Illness  BPH (benign prostatic hyperplasia) [N40.0]     61-year-old gentleman with BPH and a large median lobe failed medical management now presents for TURP  Hospital Course  Patient is a 61 y.o. male presented with severe BPH with bladder outlet obstruction with trilobar hyperplasia.  And when a TURP.  Postoperatively had some persistent hematuria it took a while for his irrigation wean down but he was able to be discharged on postop day 2.  He informed unfortunately received belladonna suppositories during the day so he had a very difficult time voiding so he had to put a catheter in him and we sent him home with this.  I will go home on antibiotics pain medication and then will arrange for him to come in the office for catheter removal..      Procedures Performed  Procedure(s):  TRANSURETHRAL RESECTION OF PROSTATE       Consults:   Consults     No orders found from 11/14/2021 to 12/14/2021.          Pertinent Test Results: labs: Routine      Condition on Discharge: Good    Vital Signs       Physical Exam:   No exam performed today,    Discharge Disposition  Home or Self Care    Discharge Medications     Discharge Medications      New Medications      Instructions Start Date   oxyCODONE-acetaminophen 7.5-325 MG per tablet  Commonly known as: PERCOCET   1 tablet, Oral, Every 4 Hours PRN      sennosides-docusate 8.6-50 MG per tablet  Commonly known as: PERICOLACE   2 tablets, Oral, Daily      sulfamethoxazole-trimethoprim 800-160 MG per tablet  Commonly known as: Bactrim DS   1 tablet, Oral, 2 Times Daily         Continue These Medications      Instructions Start Date   acetaminophen 650 MG 8 hr tablet  Commonly known as: TYLENOL   2 tablets, Oral, Every 8 Hours      escitalopram 20 MG tablet  Commonly known as: LEXAPRO   20 mg, Oral, Daily       hydroxychloroquine 200 MG tablet  Commonly known as: PLAQUENIL   200 mg, Oral, Daily      omeprazole 20 MG capsule  Commonly known as: priLOSEC   20 mg, Oral, Daily      rosuvastatin 40 MG tablet  Commonly known as: CRESTOR   40 mg, Oral, Daily      ZINC PO   1 tablet, Oral, Daily         Stop These Medications    fish oil 1000 MG capsule capsule     naproxen sodium 220 MG tablet  Commonly known as: ALEVE            Discharge Diet: Regular    Activity at Discharge: Limited activity until catheter removed.    Follow-up Appointments  Future Appointments   Date Time Provider Department Center   1/6/2022  9:00 AM LABRYAN BENSON [facility]   1/13/2022  9:00 AM Khurram Kunz MD [facility]     Additional Instructions for the Follow-ups that You Need to Schedule     Discharge Follow-up with Specified Provider: Dr John Hodges; 2 Days   As directed      To: Dr John Hodges    Follow Up: 2 Days               Test Results Pending at Discharge       John Hodges MD  12/17/21  19:10 EST    Time: Discharge 15 min

## 2022-01-13 ENCOUNTER — OFFICE VISIT (OUTPATIENT)
Dept: SPORTS MEDICINE | Facility: CLINIC | Age: 62
End: 2022-01-13

## 2022-01-13 VITALS
HEART RATE: 68 BPM | HEIGHT: 68 IN | TEMPERATURE: 98.3 F | BODY MASS INDEX: 36.22 KG/M2 | RESPIRATION RATE: 16 BRPM | SYSTOLIC BLOOD PRESSURE: 122 MMHG | OXYGEN SATURATION: 98 % | WEIGHT: 239 LBS | DIASTOLIC BLOOD PRESSURE: 86 MMHG

## 2022-01-13 DIAGNOSIS — F41.9 ANXIETY: ICD-10-CM

## 2022-01-13 DIAGNOSIS — E55.9 VITAMIN D DEFICIENCY: ICD-10-CM

## 2022-01-13 DIAGNOSIS — E78.2 MIXED HYPERLIPIDEMIA: ICD-10-CM

## 2022-01-13 DIAGNOSIS — Z00.00 ANNUAL PHYSICAL EXAM: Primary | ICD-10-CM

## 2022-01-13 PROCEDURE — 99396 PREV VISIT EST AGE 40-64: CPT | Performed by: FAMILY MEDICINE

## 2022-01-13 RX ORDER — ESCITALOPRAM OXALATE 20 MG/1
20 TABLET ORAL DAILY
Qty: 90 TABLET | Refills: 3 | Status: SHIPPED | OUTPATIENT
Start: 2022-01-13 | End: 2022-11-08

## 2022-01-13 RX ORDER — ROSUVASTATIN CALCIUM 40 MG/1
40 TABLET, COATED ORAL DAILY
Qty: 90 TABLET | Refills: 3 | Status: SHIPPED | OUTPATIENT
Start: 2022-01-13 | End: 2023-03-01

## 2022-01-13 NOTE — PROGRESS NOTES
"Familia Duke is here today for an annual physical exam.     Diet: Well Balanced Diet  Exercise: less than usual recently    S/p TURP 12/13, still having some incontinence problems.   S/p L eye vitrectomy 12/20 and doing well with that; planning R side soon    PHQ-2 Depression Screening  Little interest or pleasure in doing things? 0   Feeling down, depressed, or hopeless? 0   PHQ-2 Total Score 0       Health Maintenance   Topic Date Due   • COVID-19 Vaccine (1) Never done   • TDAP/TD VACCINES (1 - Tdap) Never done   • ZOSTER VACCINE (1 of 2) Never done   • PT PLAN OF CARE  10/17/2019   • ANNUAL PHYSICAL  06/18/2021   • INFLUENZA VACCINE  08/01/2021   • LIPID PANEL  01/06/2023   • COLORECTAL CANCER SCREENING  10/31/2028   • HEPATITIS C SCREENING  Completed   • Pneumococcal Vaccine 0-64  Aged Out       Review of Systems    /86 (BP Location: Left arm, Patient Position: Sitting, Cuff Size: Adult)   Pulse 68   Temp 98.3 °F (36.8 °C)   Resp 16   Ht 172.7 cm (68\")   Wt 108 kg (239 lb)   SpO2 98%   BMI 36.34 kg/m²      Physical Exam    Vital signs reviewed.  General appearance: No acute distress  Eyes: conjunctiva clear without erythema; pupils equally round and reactive  ENT: external ears normal; hearing normal  Neck: supple; no thyromegaly  CV: normal rate and rhythm; no peripheral edema  Respiratory: normal respiratory effort; lungs clear to auscultation bilaterally  MSK: normal gait and station; no focal joint deformity or swelling  Skin: no rash or wounds; normal turgor  Neuro: cranial nerves 2-12 grossly intact; normal sensation to light touch  Psych: mood and affect normal; recent and remote memory intact      Current Outpatient Medications:   •  acetaminophen (TYLENOL) 650 MG 8 hr tablet, Take 2 tablets by mouth Every 8 (Eight) Hours., Disp: , Rfl:   •  cholecalciferol (D3-50) 1.25 MG (89676 UT) capsule, Take 1 capsule by mouth 1 (One) Time Per Week., Disp: 12 capsule, Rfl: 0  •  escitalopram " (LEXAPRO) 20 MG tablet, Take 1 tablet by mouth Daily., Disp: 90 tablet, Rfl: 3  •  hydroxychloroquine (PLAQUENIL) 200 MG tablet, Take 200 mg by mouth Daily., Disp: , Rfl:   •  Multiple Vitamins-Minerals (ZINC PO), Take 1 tablet by mouth Daily., Disp: , Rfl:   •  omeprazole (priLOSEC) 20 MG capsule, Take 20 mg by mouth Daily., Disp: , Rfl:   •  rosuvastatin (CRESTOR) 40 MG tablet, Take 1 tablet by mouth Daily., Disp: 90 tablet, Rfl: 3    Diagnoses and all orders for this visit:    1. Annual physical exam (Primary)    2. Anxiety  -     escitalopram (LEXAPRO) 20 MG tablet; Take 1 tablet by mouth Daily.  Dispense: 90 tablet; Refill: 3    3. Mixed hyperlipidemia  -     rosuvastatin (CRESTOR) 40 MG tablet; Take 1 tablet by mouth Daily.  Dispense: 90 tablet; Refill: 3    4. Vitamin D deficiency  -     cholecalciferol (D3-50) 1.25 MG (75030 UT) capsule; Take 1 capsule by mouth 1 (One) Time Per Week.  Dispense: 12 capsule; Refill: 0    Other orders  -     Discontinue: cholecalciferol (D3-50) 1.25 MG (13456 UT) capsule; Take 1 capsule by mouth 1 (One) Time Per Week.  Dispense: 12 capsule; Refill: 0        Encourage healthy diet and exercise.  Encourage patient to stay up to date on screening examinations as indicated based on age and risk factors.  He will resume fish oil for his increased triglycerides reviewed on his labs

## 2022-01-26 ENCOUNTER — TELEPHONE (OUTPATIENT)
Dept: PAIN MEDICINE | Facility: CLINIC | Age: 62
End: 2022-01-26

## 2022-01-26 NOTE — TELEPHONE ENCOUNTER
Caller: CHAYO BANKS     Relationship to patient: SELF     Best call back number:[phone]    Type of visit: BACK ABLATION     Requested date: ASAP

## 2022-01-28 NOTE — TELEPHONE ENCOUNTER
Alexandra, Please schedule an office visit as he has not been seen since April.     Charlotte, I will re-order after eval. Thanks!

## 2022-02-01 ENCOUNTER — OFFICE VISIT (OUTPATIENT)
Dept: PAIN MEDICINE | Facility: CLINIC | Age: 62
End: 2022-02-01

## 2022-02-01 ENCOUNTER — PREP FOR SURGERY (OUTPATIENT)
Dept: SURGERY | Facility: SURGERY CENTER | Age: 62
End: 2022-02-01

## 2022-02-01 VITALS
SYSTOLIC BLOOD PRESSURE: 135 MMHG | RESPIRATION RATE: 20 BRPM | DIASTOLIC BLOOD PRESSURE: 70 MMHG | WEIGHT: 240.6 LBS | OXYGEN SATURATION: 96 % | HEIGHT: 68 IN | HEART RATE: 78 BPM | TEMPERATURE: 96.4 F | BODY MASS INDEX: 36.46 KG/M2

## 2022-02-01 DIAGNOSIS — M47.814 FACET ARTHROPATHY, THORACIC: ICD-10-CM

## 2022-02-01 DIAGNOSIS — M47.814 THORACIC SPONDYLOSIS WITHOUT MYELOPATHY: Primary | ICD-10-CM

## 2022-02-01 DIAGNOSIS — M54.6 PAIN IN THORACIC SPINE: ICD-10-CM

## 2022-02-01 DIAGNOSIS — M54.6 THORACIC SPINE PAIN: ICD-10-CM

## 2022-02-01 PROCEDURE — 99214 OFFICE O/P EST MOD 30 MIN: CPT | Performed by: NURSE PRACTITIONER

## 2022-02-01 RX ORDER — SODIUM CHLORIDE 0.9 % (FLUSH) 0.9 %
10 SYRINGE (ML) INJECTION AS NEEDED
Status: CANCELLED | OUTPATIENT
Start: 2022-02-01

## 2022-02-01 RX ORDER — SODIUM CHLORIDE 0.9 % (FLUSH) 0.9 %
10 SYRINGE (ML) INJECTION EVERY 12 HOURS SCHEDULED
Status: CANCELLED | OUTPATIENT
Start: 2022-02-01

## 2022-02-01 NOTE — PROGRESS NOTES
CHIEF COMPLAINT  FOLLOW -UP BACK AND NECK PAIN.  Pt states his pain level is getting worse .     Subjective   Familia Duke is a 61 y.o. male  who presents for follow-up.  He has a history of back and neck pain.  Today his pain is 6/10VAS in severity. His pain is mid-back pain that is worse on the left side.  This pain is worsened by bending, twisting, standing, and prolonged walking.  His pain has previously been improved significantly with radiofrequency ablation. He has tried analgesics, NSAIDs, heat, HEP, and Gabapentin (side effects) with minimal relief.     Procedure list:  3/24/2021-bilateral T9-T11 MBB-80% relief x1 day  7/30/2020-left T9-T11 RFA-95% relief x 8 months  6/12/2020-left T9-T11 MBB-80% relief x1 day  3/18/2020-left T7-T10 MBB-80% relief x1 day    Patient remained masked during entire encounter. No cough present. I donned a mask and eye protection throughout entire visit. Prior to donning mask and eye protection, hand hygiene was performed, as well as when it was doffed.  I was closer than 6 feet, but not for an extended period of time. No obvious exposure to any bodily fluids.    Back Pain  This is a chronic problem. The current episode started more than 1 year ago. The problem occurs constantly. The problem has been gradually worsening since onset. The pain is present in the thoracic spine. The quality of the pain is described as aching and stabbing. The pain does not radiate. The pain is at a severity of 6/10. The symptoms are aggravated by bending, twisting, standing and position (prolonged walking). Associated symptoms include numbness (toes). Pertinent negatives include no abdominal pain, chest pain, dysuria, fever, headaches or weakness. Risk factors include obesity, poor posture and lack of exercise. He has tried analgesics, NSAIDs, heat and home exercises (tylenol, aleve) for the symptoms. The treatment provided significant (RFL) relief.      PEG Assessment   What number best  describes your pain on average in the past week?10  What number best describes how, during the past week, pain has interfered with your enjoyment of life?10  What number best describes how, during the past week, pain has interfered with your general activity?  10    The following portions of the patient's history were reviewed and updated as appropriate: allergies, current medications, past family history, past medical history, past social history, past surgical history and problem list.    Review of Systems   Constitutional: Negative for activity change, fatigue and fever.   HENT: Negative for congestion.    Eyes: Negative for visual disturbance.   Respiratory: Negative for cough and chest tightness.    Cardiovascular: Negative for chest pain.   Gastrointestinal: Negative for abdominal pain, constipation and diarrhea.   Genitourinary: Positive for difficulty urinating. Negative for dysuria.   Musculoskeletal: Positive for back pain and neck pain.   Neurological: Positive for numbness (toes). Negative for dizziness, weakness, light-headedness and headaches.   Psychiatric/Behavioral: Positive for sleep disturbance. Negative for agitation and suicidal ideas. The patient is not nervous/anxious.      --  The aforementioned information the Chief Complaint section and above subjective data including any HPI data, and also the Review of Systems data, has been personally reviewed and affirmed.  --    POSTMYELOGRAM CT OF THORACIC SPINE TECHNIQUE: Following myelogram,  spiral CT images were obtained from the C7 cervical level down to the L3  lumbar level and images were reformatted and submitted in 2 mm thick  axial CT sections with bone algorithm and 2 mm thick axial, sagittal and  coronal CT sections with soft tissue algorithm.     FINDINGS: There is scoliotic curvature of the thoracic spine with a  dextroscoliotic curvature of the upper to mid thoracic spine apex at the  T6-T7 level, levoscoliotic curvature mid thoracic  spine apex at the  T9-T10 level and a dextroscoliotic curvature of the lower thoracic and  upper lumbar spine apex at the T12-L1 lumbar level. There are multiple  Schmorl's nodes indenting the endplates in the mid and lower thoracic  spine including the inferior endplate of T7-T8 endplates at T9-T10,  T11-T12. 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 There is also  some flowing ossification along the anterior endplates from T10 to L2,  likely serving to partially fuse the thoracic spine anteriorly from T10  to L2. There is a prominent posterior epidural fat and lateral epidural  fat in the thoracic spine from T5 down to T11 resulting in somewhat  small size to the thoracic thecal sac. Reidentified is a posterior  central right paracentral disc herniation C7-T1 abuts the ventral  surface cord mildly narrowing the canal. There is mild right bony  foraminal narrowing.     At T1-T2, T2-T3, T3-T4, posterior disc margin and facets are normal with  no canal or foraminal narrowing.     At T4-T5, there is right facet overgrowth, facet spurs mildly narrow the  right foramen. There is minimal posterior central disc bulge, but there  is no central canal or left foraminal narrowing.     At T5-T6, there is right facet overgrowth, facet spurs mildly narrowing  the right foramen. Posterior disc margin and facets are normal. There is  no canal or foraminal narrowing.     At T6-T7, there is left facet overgrowth, left facet spurs mild to  moderately narrows the left foramen. Posterior disc margin is normal  with no canal or right foraminal narrowing.     At T7-T8 left facet spurs mild to moderately narrows the left foramen.  Posterior disc margin and right facets are normal. There is no canal or  right foraminal narrowing.     At T8-T9, facet spurs extending into the posterior superior  foramen.  There is only mild-to-moderate bilateral foraminal narrowing. There is  prominent posterior epidural fat resulting in small-sized thecal sac.  There is essentially no canal narrowing.     At T9-T10, there is narrowed disc space, may be some bony bridging  across the endplates, some flowing ossification along the right  anterolateral aspect of the endplates, likely fusing the T9-T10  vertebrae. Posterior disc margin and facets are normal with no canal or  foraminal narrowing.     At T10-T11, there is some bony bridging across endplates, flowing  ossification in the anterior endplates serving to fuse the vertebrae  anteriorly. Posterior disc margin and facets are normal. There is no  canal or foraminal narrowing.     At T11-T12, there is flowing ossification fusing the vertebrae  anteriorly. Posterior disc margin and facets are normal.There is  essentially no canal or foraminal narrowing.     At T12-L1, posterior disc margin is normal. There is no canal narrowing.  There is facet spurring into the left foramen, mild-to-moderate left  foraminal narrowing. There is no right foraminal narrowing.      IMPRESSION:  1. There are multiple Schmorl's nodes indenting the endplates from T7  down to T12 with some anterior wedging from T9 to T12 suggesting a form  of Scheuermann's involving the lower thoracic spine and there are some  flowing ossification along the anterior vertebrae from T9 to L2 serving  to at least partially fuse the vertebrae anteriorly from T9 to L2.  2. There is prominent posterior and right and left lateral epidural fat  throughout the thoracic spine from T5 down to T11 resulting in somewhat  small size to the thoracic thecal sac, but no thoracic disc herniation  and no thoracic central canal narrowing is seen.  3. There is some facet spurring into the neural foramina resulting in  multilevel foraminal narrowing of the thoracic spine including mild  right foraminal narrowing at T4-T5 and T5-T6,  "moderate left foraminal  narrowing at T6-T7, and left foraminal narrowing at T7-T8, moderate  bilateral foraminal narrowing at T8-T9.        Radiation dose reduction techniques were utilized, including automated  exposure control and exposure modulation based on body size.     This report was finalized on 2/4/2020 11:40 AM by Dr. Kit Cordero M.D.    Vitals:    02/01/22 0908   BP: 135/70   Pulse: 78   Resp: 20   Temp: 96.4 °F (35.8 °C)   SpO2: 96%   Weight: 109 kg (240 lb 9.6 oz)   Height: 172.7 cm (68\")   PainSc:   6   PainLoc: Back  Comment: NECK     Objective   Physical Exam  Vitals and nursing note reviewed.   Constitutional:       Appearance: Normal appearance. He is well-developed.   Eyes:      General: Lids are normal.   Cardiovascular:      Rate and Rhythm: Normal rate.   Pulmonary:      Effort: Pulmonary effort is normal.   Musculoskeletal:      Cervical back: Normal range of motion.      Thoracic back: Tenderness and bony tenderness (left) present.      Comments: +Thoracic facet loading   Neurological:      Mental Status: He is alert and oriented to person, place, and time.   Psychiatric:         Attention and Perception: Attention normal.         Mood and Affect: Mood normal.         Speech: Speech normal.         Behavior: Behavior normal.         Judgment: Judgment normal.       Assessment/Plan   Diagnoses and all orders for this visit:    1. Thoracic spondylosis without myelopathy (Primary)    2. Pain in thoracic spine    3. Thoracic spine pain    4. Facet arthropathy, thoracic      --- Repeat Left T9-T11 RFA   --------  Education about Radiofrequency Lesioning of Medial Branches:    The medial branch blockade was intended for diagnostic purposes, with the intent of offering the patient Radiofrequency thermal rhizotomy if the MBB is diagnostically effective.  The diagnostic blockade is necessary to determine the likelihood that RF therapy could be efficacious in providing long term relief to the " patient.  As indicated above, diagnostic efficacy was established.      In the RF procedure, needles are placed to the joint lines in the same fashion, and after testing, the needle tips are heated to thermally treat the nerves, blocking the nerves by in essence damaging the nerves with the heat treatment.      Medically, a successful RF procedure should provide a patient with 50% pain relief or more for at least 6 months.  We estimate a likelihood of about an 80% chance that medical success will be realized.  We discussed & educated once again that not all patients have a medically successful result, and the patient voices understanding.  However, our clinical experience suggests that successful patients receive relief more in the range of 12 months on average.  (We also discussed that a fortunate minority of patients receive therapeutic success from the MBB, and may not require RF ablation.  If a patient receives more than 8 weeks of relief from MBB, then occasional repeat MBB for therapeutic purposes is a very reasonable alternative therapy.  This course of therapy is consistent with our LCDs according to our CMS  in the area, and therefore other insurance providers should follow accordingly.  We will monitor our patients to screen for these therapeutic responders and will offer RF therapy only when necessary.  However, in this clinical scenario, this therapeutic result was not realized, and therefore Radiofrequency Lesioning is medically necessary.)      We discussed that MBB & RF are not without risks.  Guidelines regarding anticoagulant use & neuraxial procedures will be respected.  Patients that are ill or otherwise may be at risk for sepsis will not have their spines accessed by neuraxial injections of any type.  This patient will not be offered these therapies if there is an increased risk.   We discussed that there is a risk of postprocedural pain and also a risk of worsening of clinical  picture with these procedures as with any neuraxial procedure.  All invasive procedures have risks including but not limited to bleeding, infection, injury, nerve injury, paralysis, coma, death, lack of pain relief, and worsening of clinical picture.      In this education session, all of these topics were covered and the patient voiced understanding.    ---------    --- Follow-up after procedure     MILO REPORT    As the clinician, I personally reviewed the MILO from 2/1/2022 while the patient was in the office today.    Dictated utilizing Dragon dictation.     This document is intended for medical expert use only. Reading of this document by patients and/or patient's family without participating medical staff guidance may result in misinterpretation and unintended morbidity.   Any interpretation of such data is the responsibility of the patient and/or family member responsible for the patient in concert with their primary or specialist providers, not to be left for sources of online searches such as [employer], [employer] or similar queries. Relying on these approaches to knowledge may result in misinterpretation, misguided goals of care and even death should patients or family members try recommendations outside of the realm of professional medical care in a supervised way.

## 2022-06-07 ENCOUNTER — OFFICE VISIT (OUTPATIENT)
Dept: SPORTS MEDICINE | Facility: CLINIC | Age: 62
End: 2022-06-07

## 2022-06-07 VITALS
HEART RATE: 70 BPM | TEMPERATURE: 97.2 F | DIASTOLIC BLOOD PRESSURE: 70 MMHG | OXYGEN SATURATION: 97 % | HEIGHT: 68 IN | BODY MASS INDEX: 36.68 KG/M2 | SYSTOLIC BLOOD PRESSURE: 112 MMHG | WEIGHT: 242 LBS

## 2022-06-07 DIAGNOSIS — L50.9 URTICARIA: Primary | ICD-10-CM

## 2022-06-07 PROCEDURE — 99213 OFFICE O/P EST LOW 20 MIN: CPT | Performed by: FAMILY MEDICINE

## 2022-06-07 RX ORDER — PREDNISONE 1 MG/1
TABLET ORAL
COMMUNITY
Start: 2022-04-18

## 2022-06-07 RX ORDER — TRIAMCINOLONE ACETONIDE 1 MG/G
1 CREAM TOPICAL 2 TIMES DAILY
Qty: 30 G | Refills: 1 | Status: SHIPPED | OUTPATIENT
Start: 2022-06-07 | End: 2022-11-01

## 2022-06-07 NOTE — PROGRESS NOTES
"Familia is a 61 y.o. year old male    Chief Complaint   Patient presents with   • Rash     Pt states that he noticed this a week ago. It appeared on his chest and then went over to his arm and keeps spreading.        History of Present Illness   HPI   Rash - upper arm, abdomen, patch on scrotum  About 7-10 days  Itching. Spreading      Review of Systems    /70 (BP Location: Left arm, Patient Position: Sitting, Cuff Size: Adult)   Pulse 70   Temp 97.2 °F (36.2 °C) (Temporal)   Ht 172.7 cm (67.99\")   Wt 110 kg (242 lb)   SpO2 97%   BMI 36.80 kg/m²          Physical Exam  Skin:     Comments: There is a scattered fine papular erythematous rash on bilateral upper arms and anterior abdominal wall.  This also extends to the scrotum and thighs bilaterally.   Psychiatric:         Mood and Affect: Mood normal.           Current Outpatient Medications:   •  acetaminophen (TYLENOL) 650 MG 8 hr tablet, Take 2 tablets by mouth Every 8 (Eight) Hours., Disp: , Rfl:   •  cholecalciferol (D3-50) 1.25 MG (99871 UT) capsule, Take 1 capsule by mouth 1 (One) Time Per Week., Disp: 12 capsule, Rfl: 0  •  escitalopram (LEXAPRO) 20 MG tablet, Take 1 tablet by mouth Daily., Disp: 90 tablet, Rfl: 3  •  hydroxychloroquine (PLAQUENIL) 200 MG tablet, Take 200 mg by mouth Daily., Disp: , Rfl:   •  Multiple Vitamins-Minerals (ZINC PO), Take 1 tablet by mouth Daily., Disp: , Rfl:   •  omeprazole (priLOSEC) 20 MG capsule, Take 20 mg by mouth Daily., Disp: , Rfl:   •  rosuvastatin (CRESTOR) 40 MG tablet, Take 1 tablet by mouth Daily., Disp: 90 tablet, Rfl: 3  •  hydrOXYzine (ATARAX) 25 MG tablet, Take 1 tablet by mouth 3 (Three) Times a Day As Needed for Itching., Disp: 30 tablet, Rfl: 1  •  predniSONE (DELTASONE) 5 MG tablet, , Disp: , Rfl:   •  triamcinolone (KENALOG) 0.1 % cream, Apply 1 application topically to the appropriate area as directed 2 (Two) Times a Day., Disp: 30 g, Rfl: 1     Diagnoses and all orders for this " visit:    Urticaria  -     triamcinolone (KENALOG) 0.1 % cream; Apply 1 application topically to the appropriate area as directed 2 (Two) Times a Day.    Other orders  -     predniSONE (DELTASONE) 5 MG tablet       Unclear urticarial reaction without known exposure.  We will start with topical cream and basic skin care measures.

## 2022-06-13 ENCOUNTER — TELEPHONE (OUTPATIENT)
Dept: SPORTS MEDICINE | Facility: CLINIC | Age: 62
End: 2022-06-13

## 2022-06-13 RX ORDER — HYDROXYZINE HYDROCHLORIDE 25 MG/1
25 TABLET, FILM COATED ORAL 3 TIMES DAILY PRN
Qty: 30 TABLET | Refills: 1 | Status: SHIPPED | OUTPATIENT
Start: 2022-06-13 | End: 2022-11-01

## 2022-06-13 NOTE — TELEPHONE ENCOUNTER
Patient called in to let you know that the topical steroid cream that we gave him last week is not helping, his rash has actually spread more.   Wants to know what you would advise, different medication, repeat eval in office?.     Thank you   Erinn

## 2022-06-14 NOTE — TELEPHONE ENCOUNTER
Called and spoke with patient, relayed response and recommendations, all were verbally understood.     Thanks  Erinn

## 2022-08-01 ENCOUNTER — OFFICE VISIT (OUTPATIENT)
Dept: SPORTS MEDICINE | Facility: CLINIC | Age: 62
End: 2022-08-01

## 2022-08-01 VITALS
DIASTOLIC BLOOD PRESSURE: 60 MMHG | HEIGHT: 68 IN | SYSTOLIC BLOOD PRESSURE: 120 MMHG | BODY MASS INDEX: 36.07 KG/M2 | WEIGHT: 238 LBS | OXYGEN SATURATION: 98 % | HEART RATE: 65 BPM | TEMPERATURE: 98.2 F

## 2022-08-01 DIAGNOSIS — E55.9 VITAMIN D DEFICIENCY: ICD-10-CM

## 2022-08-01 DIAGNOSIS — E78.2 MIXED HYPERLIPIDEMIA: Primary | ICD-10-CM

## 2022-08-01 DIAGNOSIS — E11.9 TYPE 2 DIABETES MELLITUS WITHOUT COMPLICATION, WITHOUT LONG-TERM CURRENT USE OF INSULIN: ICD-10-CM

## 2022-08-01 PROCEDURE — 99214 OFFICE O/P EST MOD 30 MIN: CPT | Performed by: FAMILY MEDICINE

## 2022-08-01 NOTE — PROGRESS NOTES
"Familia is a 61 y.o. year old male    Chief Complaint   Patient presents with   • Follow-up     Patient is following up on lab results for lipids and prediabetes       History of Present Illness   HPI   F/u HLD, pre-DM, vit D def. Struggling to maintain healthy diet this year.  Recently eliminated sugar and notes he is feeling much better and lost some weight as well.     Review of Systems    /60 (BP Location: Right arm, Patient Position: Sitting, Cuff Size: Adult)   Pulse 65   Temp 98.2 °F (36.8 °C) (Temporal)   Ht 172.7 cm (67.99\")   Wt 108 kg (238 lb)   SpO2 98%   BMI 36.20 kg/m²          Physical Exam  Vitals reviewed.   Pulmonary:      Effort: Pulmonary effort is normal.   Neurological:      Mental Status: He is alert.   Psychiatric:         Mood and Affect: Mood normal.           Current Outpatient Medications:   •  acetaminophen (TYLENOL) 650 MG 8 hr tablet, Take 2 tablets by mouth Every 8 (Eight) Hours., Disp: , Rfl:   •  cholecalciferol (D3-50) 1.25 MG (37606 UT) capsule, Take 1 capsule by mouth 1 (One) Time Per Week., Disp: 12 capsule, Rfl: 0  •  escitalopram (LEXAPRO) 20 MG tablet, Take 1 tablet by mouth Daily., Disp: 90 tablet, Rfl: 3  •  hydroxychloroquine (PLAQUENIL) 200 MG tablet, Take 200 mg by mouth Daily., Disp: , Rfl:   •  hydrOXYzine (ATARAX) 25 MG tablet, Take 1 tablet by mouth 3 (Three) Times a Day As Needed for Itching., Disp: 30 tablet, Rfl: 1  •  Multiple Vitamins-Minerals (ZINC PO), Take 1 tablet by mouth Daily., Disp: , Rfl:   •  omeprazole (priLOSEC) 20 MG capsule, Take 20 mg by mouth Daily., Disp: , Rfl:   •  predniSONE (DELTASONE) 5 MG tablet, , Disp: , Rfl:   •  rosuvastatin (CRESTOR) 40 MG tablet, Take 1 tablet by mouth Daily., Disp: 90 tablet, Rfl: 3  •  triamcinolone (KENALOG) 0.1 % cream, Apply 1 application topically to the appropriate area as directed 2 (Two) Times a Day., Disp: 30 g, Rfl: 1     Diagnoses and all orders for this visit:    Mixed " hyperlipidemia    Vitamin D deficiency    Type 2 diabetes mellitus without complication, without long-term current use of insulin (HCC)    Reviewed labs.  Lipids stable. A1c rising up to 6.6. Vitamin D in better range at 33.   Continue rosuvastatin and rx vitamin D3.  Continue current diet for sugar reduction, exercise, etc.  Recheck in 3 months because of rising A1c up to 6.6.

## 2022-08-29 ENCOUNTER — TELEPHONE (OUTPATIENT)
Dept: SPORTS MEDICINE | Facility: CLINIC | Age: 62
End: 2022-08-29

## 2022-08-30 NOTE — TELEPHONE ENCOUNTER
Patient called and states that he tested positive yesterday for covid-19;  He would like to know if he is able to get ivermectin called in to help with his symptoms. He states that his symptoms started last Thursday, with fever, chills, sore throat, fatigue and not being able to speak.   
Patient would like to discuss further about ivermectin and paxlovid. Please advise, thank you!  
room air

## 2022-10-24 ENCOUNTER — LAB (OUTPATIENT)
Dept: SPORTS MEDICINE | Facility: CLINIC | Age: 62
End: 2022-10-24

## 2022-10-24 DIAGNOSIS — E78.2 MIXED HYPERLIPIDEMIA: Primary | ICD-10-CM

## 2022-10-24 DIAGNOSIS — E11.9 TYPE 2 DIABETES MELLITUS WITHOUT COMPLICATION, WITHOUT LONG-TERM CURRENT USE OF INSULIN: ICD-10-CM

## 2022-10-24 DIAGNOSIS — R73.03 PREDIABETES: ICD-10-CM

## 2022-10-25 LAB
ALBUMIN SERPL-MCNC: 5 G/DL (ref 3.5–5.2)
ALBUMIN/GLOB SERPL: 2.3 G/DL
ALP SERPL-CCNC: 90 U/L (ref 39–117)
ALT SERPL-CCNC: 20 U/L (ref 1–41)
AST SERPL-CCNC: 23 U/L (ref 1–40)
BILIRUB SERPL-MCNC: 0.5 MG/DL (ref 0–1.2)
BUN SERPL-MCNC: 14 MG/DL (ref 8–23)
BUN/CREAT SERPL: 12.4 (ref 7–25)
CALCIUM SERPL-MCNC: 9.7 MG/DL (ref 8.6–10.5)
CHLORIDE SERPL-SCNC: 104 MMOL/L (ref 98–107)
CHOLEST SERPL-MCNC: 113 MG/DL (ref 0–200)
CHOLEST/HDLC SERPL: 4.35 {RATIO}
CO2 SERPL-SCNC: 28.1 MMOL/L (ref 22–29)
CREAT SERPL-MCNC: 1.13 MG/DL (ref 0.76–1.27)
EGFRCR SERPLBLD CKD-EPI 2021: 72.6 ML/MIN/1.73
GLOBULIN SER CALC-MCNC: 2.2 GM/DL
GLUCOSE SERPL-MCNC: 142 MG/DL (ref 65–99)
HBA1C MFR BLD: 6 % (ref 4.8–5.6)
HDLC SERPL-MCNC: 26 MG/DL (ref 40–60)
LDLC SERPL CALC-MCNC: 42 MG/DL (ref 0–100)
POTASSIUM SERPL-SCNC: 4.6 MMOL/L (ref 3.5–5.2)
PROT SERPL-MCNC: 7.2 G/DL (ref 6–8.5)
SODIUM SERPL-SCNC: 140 MMOL/L (ref 136–145)
TRIGL SERPL-MCNC: 296 MG/DL (ref 0–150)
VLDLC SERPL CALC-MCNC: 45 MG/DL (ref 5–40)

## 2022-11-01 ENCOUNTER — OFFICE VISIT (OUTPATIENT)
Dept: SPORTS MEDICINE | Facility: CLINIC | Age: 62
End: 2022-11-01

## 2022-11-01 VITALS
OXYGEN SATURATION: 98 % | HEIGHT: 68 IN | DIASTOLIC BLOOD PRESSURE: 74 MMHG | HEART RATE: 72 BPM | SYSTOLIC BLOOD PRESSURE: 122 MMHG | WEIGHT: 230 LBS | BODY MASS INDEX: 34.86 KG/M2 | TEMPERATURE: 97.8 F

## 2022-11-01 DIAGNOSIS — M13.0 POLYARTHRITIS: ICD-10-CM

## 2022-11-01 DIAGNOSIS — R73.03 PREDIABETES: Primary | ICD-10-CM

## 2022-11-01 DIAGNOSIS — E66.09 CLASS 1 OBESITY DUE TO EXCESS CALORIES WITH SERIOUS COMORBIDITY AND BODY MASS INDEX (BMI) OF 34.0 TO 34.9 IN ADULT: ICD-10-CM

## 2022-11-01 DIAGNOSIS — E78.2 MIXED HYPERLIPIDEMIA: ICD-10-CM

## 2022-11-01 PROCEDURE — 99214 OFFICE O/P EST MOD 30 MIN: CPT | Performed by: FAMILY MEDICINE

## 2022-11-01 NOTE — PROGRESS NOTES
"Familia is a 62 y.o. year old male    Chief Complaint   Patient presents with   • Diabetes     3 month follow up on lab work        History of Present Illness   HPI   F/u early DM, hyperlipidemia, obesity, polyarthritis. Losing weight on purpose.     Review of Systems    /74 (BP Location: Left arm, Patient Position: Sitting, Cuff Size: Adult)   Pulse 72   Temp 97.8 °F (36.6 °C) (Temporal)   Ht 172.7 cm (67.99\")   Wt 104 kg (230 lb)   SpO2 98%   BMI 34.98 kg/m²          Physical Exam  Vitals reviewed.   Constitutional:       Appearance: He is obese.   Pulmonary:      Effort: Pulmonary effort is normal.   Neurological:      Mental Status: He is alert.   Psychiatric:         Mood and Affect: Mood normal.              Diagnoses and all orders for this visit:    Prediabetes    Mixed hyperlipidemia    Polyarthritis    Class 1 obesity due to excess calories with serious comorbidity and body mass index (BMI) of 34.0 to 34.9 in adult       Reviewed labs.  A1c is substantially improved, encouraged to continue his current efforts.  Lipids well controlled  Encouraged to continue weight loss  Regarding his arthritis, I think he should discuss possible change to duloxetine with his rheumatologist      EMR Dragon/Transcription disclaimer:    Much of this encounter note is an electronic transcription/translation of spoken language to printed text.  The electronic translation of spoken language may permit erroneous, or at times, nonsensical words or phrases to be inadvertently transcribed.  Although I have reviewed the note for such errors some may still exist.    "

## 2022-11-08 RX ORDER — DULOXETIN HYDROCHLORIDE 60 MG/1
60 CAPSULE, DELAYED RELEASE ORAL DAILY
Qty: 30 CAPSULE | Refills: 2 | Status: SHIPPED | OUTPATIENT
Start: 2022-11-08 | End: 2023-01-16 | Stop reason: SDUPTHER

## 2023-01-16 ENCOUNTER — OFFICE VISIT (OUTPATIENT)
Dept: SPORTS MEDICINE | Facility: CLINIC | Age: 63
End: 2023-01-16
Payer: COMMERCIAL

## 2023-01-16 ENCOUNTER — LAB (OUTPATIENT)
Dept: LAB | Facility: HOSPITAL | Age: 63
End: 2023-01-16
Payer: COMMERCIAL

## 2023-01-16 VITALS
HEART RATE: 91 BPM | OXYGEN SATURATION: 99 % | WEIGHT: 235 LBS | HEIGHT: 68 IN | BODY MASS INDEX: 35.61 KG/M2 | SYSTOLIC BLOOD PRESSURE: 112 MMHG | TEMPERATURE: 98.2 F | DIASTOLIC BLOOD PRESSURE: 62 MMHG

## 2023-01-16 DIAGNOSIS — R73.03 PREDIABETES: ICD-10-CM

## 2023-01-16 DIAGNOSIS — E55.9 VITAMIN D DEFICIENCY: ICD-10-CM

## 2023-01-16 DIAGNOSIS — M51.34 DDD (DEGENERATIVE DISC DISEASE), THORACIC: ICD-10-CM

## 2023-01-16 DIAGNOSIS — Z12.5 SCREENING FOR PROSTATE CANCER: ICD-10-CM

## 2023-01-16 DIAGNOSIS — E78.2 MIXED HYPERLIPIDEMIA: ICD-10-CM

## 2023-01-16 DIAGNOSIS — M13.0 POLYARTHRITIS: ICD-10-CM

## 2023-01-16 DIAGNOSIS — Z00.00 ANNUAL PHYSICAL EXAM: Primary | ICD-10-CM

## 2023-01-16 DIAGNOSIS — M51.36 DDD (DEGENERATIVE DISC DISEASE), LUMBAR: ICD-10-CM

## 2023-01-16 LAB
25(OH)D3 SERPL-MCNC: 44.5 NG/ML (ref 30–100)
ALBUMIN SERPL-MCNC: 4.9 G/DL (ref 3.5–5.2)
ALBUMIN UR-MCNC: 5.4 MG/DL
ALBUMIN/GLOB SERPL: 1.7 G/DL
ALP SERPL-CCNC: 86 U/L (ref 39–117)
ALT SERPL W P-5'-P-CCNC: 28 U/L (ref 1–41)
ANION GAP SERPL CALCULATED.3IONS-SCNC: 9.7 MMOL/L (ref 5–15)
AST SERPL-CCNC: 30 U/L (ref 1–40)
BASOPHILS # BLD AUTO: 0.04 10*3/MM3 (ref 0–0.2)
BASOPHILS NFR BLD AUTO: 0.6 % (ref 0–1.5)
BILIRUB SERPL-MCNC: 0.9 MG/DL (ref 0–1.2)
BILIRUB UR QL STRIP: NEGATIVE
BUN SERPL-MCNC: 14 MG/DL (ref 8–23)
BUN/CREAT SERPL: 12.7 (ref 7–25)
CALCIUM SPEC-SCNC: 10.1 MG/DL (ref 8.6–10.5)
CHLORIDE SERPL-SCNC: 106 MMOL/L (ref 98–107)
CHOLEST SERPL-MCNC: 108 MG/DL (ref 0–200)
CLARITY UR: CLEAR
CO2 SERPL-SCNC: 28.3 MMOL/L (ref 22–29)
COLOR UR: YELLOW
CREAT SERPL-MCNC: 1.1 MG/DL (ref 0.76–1.27)
CREAT UR-MCNC: 129.1 MG/DL
DEPRECATED RDW RBC AUTO: 48.6 FL (ref 37–54)
EGFRCR SERPLBLD CKD-EPI 2021: 75 ML/MIN/1.73
EOSINOPHIL # BLD AUTO: 0.14 10*3/MM3 (ref 0–0.4)
EOSINOPHIL NFR BLD AUTO: 2 % (ref 0.3–6.2)
ERYTHROCYTE [DISTWIDTH] IN BLOOD BY AUTOMATED COUNT: 14.1 % (ref 12.3–15.4)
GLOBULIN UR ELPH-MCNC: 2.9 GM/DL
GLUCOSE SERPL-MCNC: 159 MG/DL (ref 65–99)
GLUCOSE UR STRIP-MCNC: NEGATIVE MG/DL
HBA1C MFR BLD: 6.4 % (ref 4.8–5.6)
HCT VFR BLD AUTO: 45.1 % (ref 37.5–51)
HDLC SERPL QL: 4.32
HDLC SERPL-MCNC: 25 MG/DL (ref 40–60)
HGB BLD-MCNC: 14.6 G/DL (ref 13–17.7)
HGB UR QL STRIP.AUTO: NEGATIVE
IMM GRANULOCYTES # BLD AUTO: 0.01 10*3/MM3 (ref 0–0.05)
IMM GRANULOCYTES NFR BLD AUTO: 0.1 % (ref 0–0.5)
KETONES UR QL STRIP: NEGATIVE
LDLC SERPL CALC-MCNC: 31 MG/DL (ref 0–100)
LEUKOCYTE ESTERASE UR QL STRIP.AUTO: NEGATIVE
LYMPHOCYTES # BLD AUTO: 1.59 10*3/MM3 (ref 0.7–3.1)
LYMPHOCYTES NFR BLD AUTO: 23 % (ref 19.6–45.3)
MCH RBC QN AUTO: 30.1 PG (ref 26.6–33)
MCHC RBC AUTO-ENTMCNC: 32.4 G/DL (ref 31.5–35.7)
MCV RBC AUTO: 93 FL (ref 79–97)
MICROALBUMIN/CREAT UR: 41.8 MG/G
MONOCYTES # BLD AUTO: 0.44 10*3/MM3 (ref 0.1–0.9)
MONOCYTES NFR BLD AUTO: 6.4 % (ref 5–12)
NEUTROPHILS NFR BLD AUTO: 4.7 10*3/MM3 (ref 1.7–7)
NEUTROPHILS NFR BLD AUTO: 67.9 % (ref 42.7–76)
NITRITE UR QL STRIP: NEGATIVE
NRBC BLD AUTO-RTO: 0 /100 WBC (ref 0–0.2)
PH UR STRIP.AUTO: 5.5 [PH] (ref 5–8)
PLATELET # BLD AUTO: 165 10*3/MM3 (ref 140–450)
PMV BLD AUTO: 10.1 FL (ref 6–12)
POTASSIUM SERPL-SCNC: 5 MMOL/L (ref 3.5–5.2)
PROT SERPL-MCNC: 7.8 G/DL (ref 6–8.5)
PROT UR QL STRIP: NEGATIVE
PSA SERPL-MCNC: 2.1 NG/ML (ref 0–4)
RBC # BLD AUTO: 4.85 10*6/MM3 (ref 4.14–5.8)
SODIUM SERPL-SCNC: 144 MMOL/L (ref 136–145)
SP GR UR STRIP: 1.02 (ref 1–1.03)
TRIGL SERPL-MCNC: 361 MG/DL (ref 0–150)
UROBILINOGEN UR QL STRIP: NORMAL
VLDLC SERPL-MCNC: 52 MG/DL (ref 5–40)
WBC NRBC COR # BLD: 6.92 10*3/MM3 (ref 3.4–10.8)

## 2023-01-16 PROCEDURE — 80050 GENERAL HEALTH PANEL: CPT | Performed by: FAMILY MEDICINE

## 2023-01-16 PROCEDURE — 82043 UR ALBUMIN QUANTITATIVE: CPT | Performed by: FAMILY MEDICINE

## 2023-01-16 PROCEDURE — 80061 LIPID PANEL: CPT | Performed by: FAMILY MEDICINE

## 2023-01-16 PROCEDURE — G0103 PSA SCREENING: HCPCS | Performed by: FAMILY MEDICINE

## 2023-01-16 PROCEDURE — 83036 HEMOGLOBIN GLYCOSYLATED A1C: CPT | Performed by: FAMILY MEDICINE

## 2023-01-16 PROCEDURE — 82570 ASSAY OF URINE CREATININE: CPT | Performed by: FAMILY MEDICINE

## 2023-01-16 PROCEDURE — 82306 VITAMIN D 25 HYDROXY: CPT | Performed by: FAMILY MEDICINE

## 2023-01-16 PROCEDURE — 99396 PREV VISIT EST AGE 40-64: CPT | Performed by: FAMILY MEDICINE

## 2023-01-16 PROCEDURE — 81003 URINALYSIS AUTO W/O SCOPE: CPT | Performed by: FAMILY MEDICINE

## 2023-01-16 PROCEDURE — 36415 COLL VENOUS BLD VENIPUNCTURE: CPT | Performed by: FAMILY MEDICINE

## 2023-01-16 RX ORDER — DULOXETIN HYDROCHLORIDE 60 MG/1
60 CAPSULE, DELAYED RELEASE ORAL DAILY
Qty: 90 CAPSULE | Refills: 3 | Status: SHIPPED | OUTPATIENT
Start: 2023-01-16 | End: 2023-03-01 | Stop reason: SDUPTHER

## 2023-01-16 NOTE — PROGRESS NOTES
"Familia ESTER Leilani is here today for an annual physical exam.     Diet: Well Balanced Diet - diabetes prevention/management. Losing weight on purpose.  Exercise: projects around home; limited by back pain    RLQ pain recently started again - waxing and waning without clear cause or effect. Does not seem to correlate to bowel movements. Can be severe stabbing pain at its worst.     Hand arthritis seems to be worse recently.   Chronic DDD pain comes and goes.     PHQ-2 Depression Screening  Little interest or pleasure in doing things? 0-->not at all   Feeling down, depressed, or hopeless? 0-->not at all   PHQ-2 Total Score 0       Health Maintenance   Topic Date Due   • URINE MICROALBUMIN  Never done   • COVID-19 Vaccine (1) Never done   • Pneumococcal Vaccine 0-64 (1 - PCV) Never done   • TDAP/TD VACCINES (1 - Tdap) Never done   • ZOSTER VACCINE (1 of 2) Never done   • DIABETIC FOOT EXAM  Never done   • DIABETIC EYE EXAM  Never done   • PT PLAN OF CARE  10/17/2019   • INFLUENZA VACCINE  08/01/2022   • ANNUAL PHYSICAL  01/13/2023   • HEMOGLOBIN A1C  04/24/2023   • LIPID PANEL  10/24/2023   • COLORECTAL CANCER SCREENING  10/31/2028   • HEPATITIS C SCREENING  Completed       Review of Systems    /62 (BP Location: Right arm, Patient Position: Sitting, Cuff Size: Adult)   Pulse 91   Temp 98.2 °F (36.8 °C) (Temporal)   Ht 172.7 cm (67.99\")   Wt 107 kg (235 lb)   SpO2 99%   BMI 35.74 kg/m²      Physical Exam    Vital signs reviewed.  General appearance: No acute distress  Eyes: conjunctiva clear without erythema; pupils equally round and reactive  ENT: external ears normal; hearing normal  Neck: supple; no thyromegaly  CV: normal rate and rhythm; no peripheral edema  Respiratory: normal respiratory effort; lungs clear to auscultation bilaterally  MSK: normal gait and station; no focal joint deformity or swelling  Skin: no rash or wounds; normal turgor  Neuro: cranial nerves 2-12 grossly intact; normal sensation to " light touch  Psych: mood and affect normal; recent and remote memory intact    GI: Abdomen is soft.  There is tenderness in the right lower quadrant but it feels more superficial in the muscular layer.  No palpable hernia defect.      Current Outpatient Medications:   •  acetaminophen (TYLENOL) 650 MG 8 hr tablet, Take 2 tablets by mouth Every 8 (Eight) Hours., Disp: , Rfl:   •  cholecalciferol (D3-50) 1.25 MG (67548 UT) capsule, Take 1 capsule by mouth 1 (One) Time Per Week., Disp: 12 capsule, Rfl: 0  •  DULoxetine (Cymbalta) 60 MG capsule, Take 1 capsule by mouth Daily., Disp: 30 capsule, Rfl: 2  •  hydroxychloroquine (PLAQUENIL) 200 MG tablet, Take 200 mg by mouth Daily., Disp: , Rfl:   •  Multiple Vitamins-Minerals (ZINC PO), Take 1 tablet by mouth Daily., Disp: , Rfl:   •  omeprazole (priLOSEC) 20 MG capsule, Take 20 mg by mouth Daily., Disp: , Rfl:   •  predniSONE (DELTASONE) 5 MG tablet, , Disp: , Rfl:   •  rosuvastatin (CRESTOR) 40 MG tablet, Take 1 tablet by mouth Daily., Disp: 90 tablet, Rfl: 3    Diagnoses and all orders for this visit:    1. Annual physical exam (Primary)  -     CBC & Differential  -     Comprehensive Metabolic Panel  -     Lipid Panel With / Chol / HDL Ratio  -     Thyroid Cascade Profile  -     Urinalysis With Culture If Indicated -  -     Hemoglobin A1c  -     Microalbumin / Creatinine Urine Ratio - Urine, Clean Catch  -     PSA Screen  -     Vitamin D,25-Hydroxy    2. Mixed hyperlipidemia  -     Lipid Panel With / Chol / HDL Ratio    3. Polyarthritis  -     Ambulatory Referral to Physical Therapy Evaluate and treat, Aquatics    4. Vitamin D deficiency  -     Vitamin D,25-Hydroxy    5. Screening for prostate cancer  -     PSA Screen    6. Prediabetes  -     Hemoglobin A1c    7. DDD (degenerative disc disease), lumbar  -     Ambulatory Referral to Physical Therapy Evaluate and treat, Aquatics    8. DDD (degenerative disc disease), thoracic  -     Ambulatory Referral to Physical Therapy  Evaluate and treat, Aquatics        Encourage healthy diet and exercise.  Encourage patient to stay up to date on screening examinations as indicated based on age and risk factors.    Regarding his right lower quadrant pain, he is on follow-up with his urologist.  He has a few other things to address there, so we will get their opinion first and if he needs another CT scan of some kind.  If there is no cause identified there, then I think the next step is a repeat CT scan and if that is normal consider possible ilioinguinal nerve entrapment.      EMR Dragon/Transcription disclaimer:    Much of this encounter note is an electronic transcription/translation of spoken language to printed text.  The electronic translation of spoken language may permit erroneous, or at times, nonsensical words or phrases to be inadvertently transcribed.  Although I have reviewed the note for such errors some may still exist.

## 2023-01-17 LAB — TSH SERPL DL<=0.005 MIU/L-ACNC: 2.61 UIU/ML (ref 0.45–4.5)

## 2023-01-21 RX ORDER — ICOSAPENT ETHYL 1000 MG/1
2 CAPSULE ORAL 2 TIMES DAILY WITH MEALS
Qty: 120 CAPSULE | Refills: 5 | Status: SHIPPED | OUTPATIENT
Start: 2023-01-21

## 2023-02-13 ENCOUNTER — TELEPHONE (OUTPATIENT)
Dept: SPORTS MEDICINE | Facility: CLINIC | Age: 63
End: 2023-02-13
Payer: COMMERCIAL

## 2023-02-13 RX ORDER — AMOXICILLIN 875 MG/1
875 TABLET, COATED ORAL 2 TIMES DAILY
Qty: 20 TABLET | Refills: 0 | Status: SHIPPED | OUTPATIENT
Start: 2023-02-13

## 2023-02-13 NOTE — TELEPHONE ENCOUNTER
I called the patient back and informed him of the message below. He had no further questions or requests at this time. Thank you!    -KODAK Case

## 2023-02-13 NOTE — TELEPHONE ENCOUNTER
Patient called and states that he has a sinus infection and would like to get something called in. He has been having pressure around the eyes and cheek bones. He has not taken a COVID-19 test and states that this all started yesterday. Please advise, thank you!      -KODAK Case

## 2023-02-13 NOTE — TELEPHONE ENCOUNTER
I sent a prescription for amoxicillin to his pharmacy.  Recommend continued use of over-the-counter treatments including Mucinex and decongestant

## 2023-03-01 ENCOUNTER — TREATMENT (OUTPATIENT)
Dept: PHYSICAL THERAPY | Facility: CLINIC | Age: 63
End: 2023-03-01
Payer: COMMERCIAL

## 2023-03-01 DIAGNOSIS — E78.2 MIXED HYPERLIPIDEMIA: ICD-10-CM

## 2023-03-01 DIAGNOSIS — G89.29 CHRONIC BILATERAL LOW BACK PAIN WITHOUT SCIATICA: Primary | ICD-10-CM

## 2023-03-01 DIAGNOSIS — M54.6 CHRONIC BILATERAL THORACIC BACK PAIN: ICD-10-CM

## 2023-03-01 DIAGNOSIS — M41.9 SCOLIOSIS OF THORACOLUMBAR SPINE, UNSPECIFIED SCOLIOSIS TYPE: ICD-10-CM

## 2023-03-01 DIAGNOSIS — F41.9 ANXIETY: ICD-10-CM

## 2023-03-01 DIAGNOSIS — E55.9 VITAMIN D DEFICIENCY: ICD-10-CM

## 2023-03-01 DIAGNOSIS — G89.29 CHRONIC BILATERAL THORACIC BACK PAIN: ICD-10-CM

## 2023-03-01 DIAGNOSIS — M54.50 CHRONIC BILATERAL LOW BACK PAIN WITHOUT SCIATICA: Primary | ICD-10-CM

## 2023-03-01 PROCEDURE — 97110 THERAPEUTIC EXERCISES: CPT | Performed by: PHYSICAL THERAPIST

## 2023-03-01 PROCEDURE — 97162 PT EVAL MOD COMPLEX 30 MIN: CPT | Performed by: PHYSICAL THERAPIST

## 2023-03-01 RX ORDER — ROSUVASTATIN CALCIUM 40 MG/1
TABLET, COATED ORAL
Qty: 90 TABLET | Refills: 3 | Status: SHIPPED | OUTPATIENT
Start: 2023-03-01

## 2023-03-01 RX ORDER — ESCITALOPRAM OXALATE 20 MG/1
TABLET ORAL
Qty: 90 TABLET | Refills: 3 | Status: SHIPPED | OUTPATIENT
Start: 2023-03-01

## 2023-03-01 RX ORDER — CHOLECALCIFEROL (VITAMIN D3) 1250 MCG
CAPSULE ORAL
Qty: 12 CAPSULE | Refills: 0 | Status: SHIPPED | OUTPATIENT
Start: 2023-03-01

## 2023-03-01 NOTE — PROGRESS NOTES
Physical Therapy Initial Evaluation and Plan of Care      Patient: Familia Duke   : 1960  Diagnosis/ICD-10 Code:  Chronic bilateral low back pain without sciatica [M54.50, G89.29]  Referring practitioner: Khurram Kunz MD  Date of Initial Visit: 3/1/2023  Today's Date: 3/1/2023  Patient seen for 1 sessions    [facility] [place] [address]  [phone] (phone)  [phone] (fax)         Subjective Evaluation    History of Present Illness  Onset date: chronic.  Mechanism of injury: Watson has a history of 2 back surgeries, scoliosis, chronic back pain. First surgery in , discectomy of L4/5/S1. He did well for quite some time. Then in the second surgery was on 19, open lumbar decompression from L3-S1. Has had facet injections that helped (in the thoracic area) on 3/24/21, but insurance doesn't pay for it anymore. He was supposed to have RFA but the insurance denied it. He does have LBP, but more of his pain is in the thoracic area, can be stabbing and sharp. He feels like a rib gets out of place, has seen chiro who will manipulate it, feels better, but after about 2 hours is goes back to being painful again. He has a lot of tightness as well in the spine. Hard time being in any prolonged position. He has had massages in the past, tried acupuncture, and dry needling. Massage gave the most benefit, some with the acupuncture. MD wanted him to try aquatic therapy for more spinal decompression    PMHx: 2 spine decompressions, B shoulder surgeries (RC repair, biceps tenodesis), B knee arthroscopy, B foot surgery      Patient Occupation: , mainly deals with [employer]  Current pain ratin  At best pain ratin  At worst pain rating: 10  Location: mid to lower thoracic, upper lumbar  Quality: sharp, knife-like, burning and tight  Relieving factors: change in position (warm shower, massage, OTC  meds)  Aggravating factors: prolonged positioning, standing, ambulation, sleeping, overhead activity and lifting    Social Support  Lives in: one-story house  Lives with: spouse    Treatments  Previous treatment: chiropractic, massage, physical therapy, injection treatment and medication (dry needling, acupuncture)  Patient Goals  Patient goals for therapy: decreased pain, return to sport/leisure activities and increased motion             Objective          Static Posture     Comments  L>R iliac crest level, R convex scoliosis, R thoracic rib hump, rounded shoulders    Postural Observations    Additional Postural Observation Details  Pt has to shift in chair often for discomfort    Palpation   Left   Hypertonic in the erector spinae, cervical paraspinals, lower trapezius, lumbar paraspinals, middle trapezius, quadratus lumborum and rhomboids.   Tenderness of the erector spinae, cervical paraspinals, lower trapezius and lumbar paraspinals.     Right   Hypertonic in the erector spinae, cervical paraspinals, lower trapezius, lumbar paraspinals, middle trapezius, quadratus lumborum and rhomboids. Tenderness of the erector spinae, cervical paraspinals, lower trapezius and lumbar paraspinals.     Neurological Testing     Sensation     Lumbar   Left   Intact: light touch    Right   Intact: light touch    Active Range of Motion     Additional Active Range of Motion Details  Lumbar AROM:  Flexion= 40%  Extension= 10%, pain in L mid-lower thoracic area  R rotation= 30%, some pain on the L thoracic  L rotation= 30%, sharp pain on L mid back        Passive Range of Motion     Additional Passive Range of Motion Details  Hypomobility throughout thoracic spine, pain at L 8th rib with PA spinging    Strength/Myotome Testing     Left Hip   Planes of Motion   Flexion: 5  Abduction: 4+  Adduction: 5  External rotation: 4+    Right Hip   Planes of Motion   Flexion: 5  Abduction: 4+  Adduction: 5  External rotation: 4+    Left Knee    Flexion: 5  Extension: 5    Right Knee   Flexion: 5  Extension: 5    Additional Strength Details  Trunk strength 4/5    Left Hip Flexibility Comments:   Mild to moderate tightness of hip rotators and hamstrings    Right Hip Flexibility Comments:   Mild to moderate tightness of hip rotators and hamstrings        Exercise:  -rhomboid stretch (barrel hug) 3x20 sec  -discussed angry cat (has done in the past)    Discussed his scoliosis at length. He does have a L>R mild LLD, showed spine diagrams on the computer and with the spine model how the forces are different through the spine    Manual: when performing PROM assessment, pt had sharp pain with PA springing of the L 8th rib. Did perform a manipulation with having patient exhale during thrust. Did have a cavitation with relief of current symptom       Functional Outcome Score:   Oswestry Back Index=52%        Assessment & Plan     Assessment  Impairments: abnormal muscle tone, abnormal or restricted ROM, activity intolerance, lacks appropriate home exercise program and pain with function  Functional Limitations: carrying objects, lifting, sleeping, walking, uncomfortable because of pain, moving in bed, sitting, standing, stooping, reaching overhead and unable to perform repetitive tasks  Assessment details: Familia Duke is a 62 y.o. year-old male referred to physical therapy for thoracic and LBP. He presents with a evolving clinical presentation.  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 Watson has decreased lumbar and thoracic A/PROM, pain in the L midthoracic with hypomobility and pain on the L 8th rib (elevation), tightness of muscles throughout the spine, and decreased flexibility. Pt would benefit from therapy to help improve his ability to tolerate prolonged positioning with decreased pain.   Prognosis: good    Goals  Plan Goals: ST wks  1. Patient will  be independent with education for symptom management, joint protection and strategies to minimize stress on affected tissues  2. Pt to improve pain complaints to no more than 7/10 with ADLs    LT wks  1. Pt to improve pain complaints to no more than 4/10 with ADLs  2. Pt to improve trunk strength by 1/2 to 1 MMT grade  3. Pt to improve Oswestry Back index score from 52% to 32% for overall functional improvement  4. Pt to be independent with HEP for ROM, flexibility and core strength    Plan  Therapy options: will be seen for skilled therapy services  Planned modality interventions: cryotherapy, electrical stimulation/Russian stimulation, TENS, ultrasound, dry needling, hydrotherapy and thermotherapy (hydrocollator packs)  Other planned modality interventions: aquatic therapy  Planned therapy interventions: abdominal trunk stabilization, ADL retraining, body mechanics training, flexibility, functional ROM exercises, home exercise program, IADL retraining, joint mobilization, manual therapy, neuromuscular re-education, postural training, soft tissue mobilization, spinal/joint mobilization, strengthening, stretching, therapeutic activities and transfer training  Frequency: 2x week  Duration in weeks: 12  Treatment plan discussed with: patient        Timed:  Manual Therapy:    5     mins  47431;  Therapeutic Exercise:    15     mins  00126;     Neuromuscular Nichole:        mins  44151;    Therapeutic Activity:          mins  64109;     Gait Training:           mins  79763;     Ultrasound:          mins  32833;    Iontophoresis         mins 36000        Untimed:  Electrical Stimulation:         mins  11610 ( );  Traction:       mins  22762;   Dry Needling   (1-2 muscles)             mins 27531 (Self-pay)  Dry Needling (3-4 muscles)           mins 52612 (Self-pay)  Dry Needling Trial              mins DRYNDLTRIAL  (No Charge)    Low Eval          Mins  35534  Mod Eval     30     Mins  06077  High Eval                             Mins  66904    Timed Treatment:   20   mins   Total Treatment:     50   mins    PT SIGNATURE: Charlotte Bailon PT, CDNT    License Number: [id number]    Electronically signed by Charlotte Bailon PT, 03/01/23, 9:04 AM EST    DATE TREATMENT INITIATED: 3/1/2023    Initial Certification  Certification Period: 5/30/2023  I certify that the therapy services are furnished while this patient is under my care.  The services outlined above are required by this patient, and will be reviewed every 90 days.     PHYSICIAN: Khurram Kunz MD   NPI: [id number]                                         DATE:     Please sign and return via fax to [phone] Thank you, [facility].

## 2023-03-16 ENCOUNTER — TREATMENT (OUTPATIENT)
Dept: PHYSICAL THERAPY | Facility: CLINIC | Age: 63
End: 2023-03-16
Payer: COMMERCIAL

## 2023-03-16 DIAGNOSIS — G89.29 CHRONIC BILATERAL LOW BACK PAIN WITHOUT SCIATICA: Primary | ICD-10-CM

## 2023-03-16 DIAGNOSIS — M41.9 SCOLIOSIS OF THORACOLUMBAR SPINE, UNSPECIFIED SCOLIOSIS TYPE: ICD-10-CM

## 2023-03-16 DIAGNOSIS — G89.29 CHRONIC BILATERAL THORACIC BACK PAIN: ICD-10-CM

## 2023-03-16 DIAGNOSIS — M54.50 CHRONIC BILATERAL LOW BACK PAIN WITHOUT SCIATICA: Primary | ICD-10-CM

## 2023-03-16 DIAGNOSIS — M54.6 CHRONIC BILATERAL THORACIC BACK PAIN: ICD-10-CM

## 2023-03-16 PROCEDURE — 97113 AQUATIC THERAPY/EXERCISES: CPT | Performed by: PHYSICAL THERAPIST

## 2023-03-16 NOTE — PROGRESS NOTES
"Physical Therapy Daily Treatment Note    [facility]  [address] [place] [address]  [phone] (phone)  [phone] (fax)    Patient: Familia Duke   : 1960  Diagnosis/ICD-10 Code:  Chronic bilateral low back pain without sciatica [M54.50, G89.29]  Referring practitioner: Khurram Kunz MD  Date of Initial Visit: Type: THERAPY  Noted: 3/1/2023  Treatment Date: 3/16/2023  Patient seen for 2 sessions             Subjective Evaluation    History of Present Illness    Subjective comment: Pain about 6/10, which is pretty typical.  I am [place] and basically read lips.           Objective       AQUATIC EX:     Water Walk                 Forward x 4 laps, sideways x 3 laps (~ 4'3\" depth)  March Walk                 *Next?  Stretch 1                      HS 20 sec x 2 ea  Stretch 2                      Piriformis 20 sec x 2 ea  Stretch 3                     Wall 30 sec x 2   Stretch Other 1           -  Stretch Other 2           -  scap retraction            3 sec x 10  UTR w/ noodle            10x ea side   Vertical Traction          LN/rail x 3 min   Abdominals                 LN x 15  Clams                          -  Hip Abd/Add                10x ea  Hip Hip Flex/Ext          10x ea  March in Place            15x  Mini Squat                   10x  Toe/Heel Raises         -  Hip circles cw/ccw      -  Uni-Squat                    -  Uni-Clock                    -  Step Ups                     -  Bicycle                         2 min, seated  Flutter/Scissor             15 / 15  Exercise 1                   -  Exercise 2                   -  Exercise 3                   -  Exercise 4                   -  Exercise 5                   -  Exercise 6                   -        Assessment & Plan     Assessment    Assessment details: Patient seen today for initial aquatic therapy session including education and instruction in basic aquatic ex/activity for mobility, " "flexibility, and strength/stabilization.  He completed water walking and most standing LE ex in ~ 4' to 4' 3\" depth for greater spinal decompression.  He reported feeling some discomfort in his thoracic spine about mid-way through session so attempted decompression break at that time but had R LE HS cramping which he was able to walk out.   Had him try wall crawl stretch which he indicated felt good and he was able to relax for decompression float afterward.  Emphasis placed on standing tall, engaging abdominals, & keeping ex performance in comfortable range to minimize compensation/strain on spine and associated soft tissues.  PT provided demonstration and cuing throughout session for optimal posture, core/glut activation, and correct form/technique with ex/activity.     Plan:  Assess response to initial aquatic session and modify/progress as appropriate.  May consider seated modified prayer stretch, open books, &/or additional UE / core ex in future.                 Timed:  Aquatic Therapy   33     mins 83957;    Karen Sarkar PT  Physical Therapist    [place] License: [id number]  "

## 2023-03-21 ENCOUNTER — TREATMENT (OUTPATIENT)
Dept: PHYSICAL THERAPY | Facility: CLINIC | Age: 63
End: 2023-03-21
Payer: COMMERCIAL

## 2023-03-21 DIAGNOSIS — M41.9 SCOLIOSIS OF THORACOLUMBAR SPINE, UNSPECIFIED SCOLIOSIS TYPE: ICD-10-CM

## 2023-03-21 DIAGNOSIS — M54.50 CHRONIC BILATERAL LOW BACK PAIN WITHOUT SCIATICA: Primary | ICD-10-CM

## 2023-03-21 DIAGNOSIS — M54.6 CHRONIC BILATERAL THORACIC BACK PAIN: ICD-10-CM

## 2023-03-21 DIAGNOSIS — G89.29 CHRONIC BILATERAL LOW BACK PAIN WITHOUT SCIATICA: Primary | ICD-10-CM

## 2023-03-21 DIAGNOSIS — G89.29 CHRONIC BILATERAL THORACIC BACK PAIN: ICD-10-CM

## 2023-03-21 PROCEDURE — 97113 AQUATIC THERAPY/EXERCISES: CPT | Performed by: PHYSICAL THERAPIST

## 2023-03-21 NOTE — PROGRESS NOTES
"Physical Therapy Daily Treatment Note    [facility]  [address] [place] [address]  [phone] (phone)  [phone] (fax)    Patient: Familia Duke   : 1960  Diagnosis/ICD-10 Code:  Chronic bilateral low back pain without sciatica [M54.50, G89.29]  Referring practitioner: Khurram Kunz MD  Date of Initial Visit: Type: THERAPY  Noted: 3/1/2023  Today's Date: 3/21/2023  Patient seen for 3 sessions             Subjective Evaluation    History of Present Illness    Subjective comment: I wasn't really sore like I thought I would be and my body felt a little less tight/stiff after last time.  I think this is helping, seems like I have been waking up with less back pain.       Objective     AQUATIC EX:     Water Walk                 Forward x 4 laps, sideways x 3 laps (~ 4'3\" depth)  March Walk  *Next?  Stretch 1                      HS 20 sec x 2 ea  Stretch 2                      Piriformis 20 sec x 2 ea  Stretch 3                     Wall 30 sec x 2   Stretch Other 1           hip sweeps x 10, noodle under bent knee   Stretch Other 2           -  scap retraction            3 sec x 10  UTR w/ noodle            10x ea side   Open books  8x ea (one arm anchored at rail)  Vertical Traction          LN/rail x 2 min   Abdominals                 LN x 15  Clams                          -  Hip Abd/Add                12x ea  Hip Hip Flex/Ext          12x ea  March in Place            15x  Mini Squat                   12x  Toe/Heel Raises         -  Hip circles cw/ccw 10 ea direction   Uni-Squat                    -  Uni-Clock                    -  Step Ups                     -  Bicycle                         2 min, seated  Flutter/Scissor             15 / 15  Exercise 1                   seated modified prayer stretch 10 sec x 5  Exercise 2                   -  Exercise 3                   -  Exercise 4                   -  Exercise 5                   " "-  Exercise 6                   -      Assessment & Plan     Assessment    Assessment details: Patient reports no soreness and felt less stiff / tight after initial aquatic therapy session.  Continued with previous aquatic ex/activity for mobility, flexibility, and strength/stabilization.  Increased reps on a few ex and added open books, modified prayer stretch, hip sweeps, and hip circles this visit.  He \"felt it\" on piriformis stretch but in a good way.  He noted \"popping\" in his back with open book ex so encouraged gentle motion.  Instructed him to focus on ex quality / form and control to minimize compensation / strain on spine and associated soft tissues.  PT provided demonstration and cuing throughout session for optimal posture, core/glut activation, and correct form/technique with ex/activity.  Discussed supine OH flex B and alt as well as SL open books for home.    Plan:  Continue to assess patient response to aquatic ex/activity and modify/progress as appropriate.  May consider adding step up with knee  + opp UE reach and / or more UE / core ex in future sessions.                 Timed:  Aquatic Therapy    39     mins 25044;    Karen Sarkar PT  Physical Therapist    [place] License: [id number]  "

## 2023-03-23 ENCOUNTER — TREATMENT (OUTPATIENT)
Dept: PHYSICAL THERAPY | Facility: CLINIC | Age: 63
End: 2023-03-23
Payer: COMMERCIAL

## 2023-03-23 DIAGNOSIS — M54.6 CHRONIC BILATERAL THORACIC BACK PAIN: ICD-10-CM

## 2023-03-23 DIAGNOSIS — M41.9 SCOLIOSIS OF THORACOLUMBAR SPINE, UNSPECIFIED SCOLIOSIS TYPE: ICD-10-CM

## 2023-03-23 DIAGNOSIS — M54.50 CHRONIC BILATERAL LOW BACK PAIN WITHOUT SCIATICA: Primary | ICD-10-CM

## 2023-03-23 DIAGNOSIS — G89.29 CHRONIC BILATERAL THORACIC BACK PAIN: ICD-10-CM

## 2023-03-23 DIAGNOSIS — G89.29 CHRONIC BILATERAL LOW BACK PAIN WITHOUT SCIATICA: Primary | ICD-10-CM

## 2023-03-23 PROCEDURE — 97113 AQUATIC THERAPY/EXERCISES: CPT | Performed by: PHYSICAL THERAPIST

## 2023-03-23 NOTE — PROGRESS NOTES
"Physical Therapy Daily Treatment Note    [facility]  [address] [place] [address]  [phone] (phone)  [phone] (fax)    Patient: Familia Duke   : 1960  Diagnosis/ICD-10 Code:  Chronic bilateral low back pain without sciatica [M54.50, G89.29]  Referring practitioner: Khurram Kunz MD  Date of Initial Visit: Type: THERAPY  Noted: 3/1/2023  Today's Date: 3/30/2023  Patient seen for 4 sessions             Subjective Evaluation    History of Present Illness    Subjective comment: I was a little sore after last time but just in the few sessions I've had, I do feel like I'm getting more limber.       Objective      AQUATIC EX:  Water Walk                 Forward x 4 laps, sideways x 3 laps (~ 4'3\" depth) - on own  March Walk                 2 laps   Stretch 1                      HS 30 ea followed by hip sweeps x 10 w/ SN under ankle   Stretch 2                      Piriformis 20 sec x 2 ea  Stretch 3                     Wall 30 sec x 2   Stretch Other 1           hip sweeps x 10, noodle under bent knee -*deferred  scap retraction            3 sec x 10  UTR w/ noodle            10x ea side   Open books                 10x ea (one arm anchored at rail)  Vertical Traction          LN/rail x 2 min   Abdominals                 LN x 20  Hip Abd/Add              15x ea  Hip Hip Flex/Ext      15x ea  March in Place            15x  Mini Squat                   12x rasing beach ball OH on return to standing  Hip circles cw/ccw      10 ea direction   Bicycle                         2 min, seated  Flutter/Scissor             15 / 15  Exercise 1                   seated modified prayer stretch 10 sec x 5    Assessment & Plan     Assessment    Assessment details: Patient reports being a little sore after last time, not waking up with as much pain, and feeling a little more limber since starting aquatic therapy.  Continued with previous aquatic ex/activity for " mobility, flexibility, and strength/stabilization.  Increased reps on a few ex, progressed to hip sweep w/ noodle under ankle, tried march walking, and added OH reach with beach ball on mini squats this visit.  He had difficulty keeping good form on squats and coordinating OH beach ball raise on return to standing.  Encouraged him to gently increase ROM on hip sweeps, UTR, open books, and modified prayer stretch..  Demonstration and cuing provided throughout session for optimal posture, core/glut activation, and correct form/technique with ex/activity.      Plan:  Continue to assess patient response to aquatic ex/activity and modify/progress as appropriate.  May consider adding step up with knee  + opp UE reach and / or more UE / core ex in future sessions.                 Timed:  Aquatic Therapy    38     mins 29664;    Karen Sarkar PT  Physical Therapist    [place] License: [id number]

## 2023-03-28 ENCOUNTER — TREATMENT (OUTPATIENT)
Dept: PHYSICAL THERAPY | Facility: CLINIC | Age: 63
End: 2023-03-28
Payer: COMMERCIAL

## 2023-03-28 DIAGNOSIS — M54.50 CHRONIC BILATERAL LOW BACK PAIN WITHOUT SCIATICA: Primary | ICD-10-CM

## 2023-03-28 DIAGNOSIS — G89.29 CHRONIC BILATERAL THORACIC BACK PAIN: ICD-10-CM

## 2023-03-28 DIAGNOSIS — M41.9 SCOLIOSIS OF THORACOLUMBAR SPINE, UNSPECIFIED SCOLIOSIS TYPE: ICD-10-CM

## 2023-03-28 DIAGNOSIS — G89.29 CHRONIC BILATERAL LOW BACK PAIN WITHOUT SCIATICA: Primary | ICD-10-CM

## 2023-03-28 DIAGNOSIS — M54.6 CHRONIC BILATERAL THORACIC BACK PAIN: ICD-10-CM

## 2023-03-28 PROCEDURE — 97113 AQUATIC THERAPY/EXERCISES: CPT | Performed by: PHYSICAL THERAPIST

## 2023-03-28 NOTE — PROGRESS NOTES
"Physical Therapy Daily Treatment Note    [facility] Physical Therapy Milestone  [address] [place] [address]  [phone] (phone)  [phone] (fax)    Patient: Familia Duke   : 1960  Diagnosis/ICD-10 Code:  Chronic bilateral low back pain without sciatica [M54.50, G89.29]  Referring practitioner: Khurram Kunz MD  Date of Initial Visit: Type: THERAPY  Noted: 3/1/2023  Today's Date: 3/28/2023  Patient seen for 5 sessions             Subjective Evaluation    History of Present Illness    Subjective comment: I can tell I have gained mobility since starting the water therapy.  I've also lost about 6 pounds.         Objective     AQUATIC EX:    Water Walk                 Forward x 4 laps, sideways x 3 laps (~ 4'3\" depth) - on own  March Walk                 2 laps   Stretch 1                      HS 30 ea followed by hip sweeps x 10 w/ SN under ankle   Stretch 2                      Piriformis 20 sec x 2 ea  Stretch 3                     Wall 30 sec x 2   Stretch Other 1           seated piriformis stretch 20 sec ea   scap retraction            3 sec x 10  UTR w/ noodle            10x ea side   Open books                 10x ea using green aqualogix (opp arm anchored at rail)  Vertical Traction          LN/rail x 2 min - on own  Abdominals                 LN x 20  Hip Abd/Add                 15x ea  Hip Hip Flex/Ext           15x ea  March in Place            15x  STS from pool bench   15x rasing beach ball OH w/ standing  Hip circles cw/ccw      12x ea direction   Step ups with hip/knee  and opposite UE reach  10x ea, bottom pool step  Bicycle                         2 min, seated  Flutter/Scissor             15 / 15  Seated modified prayer stretch  10 sec x 5 forward, 10 sec x 3 to ea side  Alt UE rows   12x ea, closed paddles      Assessment & Plan     Assessment    Assessment details: Patient reports feeling less stiff and doing stretches more regularly at home.  " Continued with previous aquatic ex/activity for mobility, flexibility, and strength/stabilization.  Increased reps on a couple of ex, used green aqualogix on open books, and added step ups, UE rows, seated piriformis stretch, and lateral prayer stretch this visit.  Changed to STS with OH beach ball raise insteady of squats due to difficulty coordinating movement maintaining good squat form/technique.  Patient demonstrates increasing ROM with exercises and states he doesn't feel as much tightness with daily activities.  Demonstration and cuing provided throughout session for optimal posture, core/glut activation, and correct form/technique with ex/activity.      Plan:  Continue with aquatic ex/activity progressing as appropriate/tolerated.  May consider adding quad/hip flexor stretch, add/groin stretch, and/or more UE / core ex in future sessions.                 Timed:  Aquatic Therapy    42     mins [id number];    Karen Sarkar PT  Physical Therapist    [place] License: [id number]

## 2023-03-30 ENCOUNTER — TREATMENT (OUTPATIENT)
Dept: PHYSICAL THERAPY | Facility: CLINIC | Age: 63
End: 2023-03-30
Payer: COMMERCIAL

## 2023-03-30 DIAGNOSIS — M41.9 SCOLIOSIS OF THORACOLUMBAR SPINE, UNSPECIFIED SCOLIOSIS TYPE: ICD-10-CM

## 2023-03-30 DIAGNOSIS — M54.50 CHRONIC BILATERAL LOW BACK PAIN WITHOUT SCIATICA: Primary | ICD-10-CM

## 2023-03-30 DIAGNOSIS — M54.6 CHRONIC BILATERAL THORACIC BACK PAIN: ICD-10-CM

## 2023-03-30 DIAGNOSIS — G89.29 CHRONIC BILATERAL THORACIC BACK PAIN: ICD-10-CM

## 2023-03-30 DIAGNOSIS — G89.29 CHRONIC BILATERAL LOW BACK PAIN WITHOUT SCIATICA: Primary | ICD-10-CM

## 2023-03-30 PROCEDURE — 97113 AQUATIC THERAPY/EXERCISES: CPT | Performed by: PHYSICAL THERAPIST

## 2023-03-30 NOTE — PROGRESS NOTES
"Physical Therapy Daily Treatment Note    [facility] Physical Therapy Milestone  [address] [place] [address]  [phone] (phone)  [phone] (fax)    Patient: Familia Duke   : 1960  Diagnosis/ICD-10 Code:  Chronic bilateral low back pain without sciatica [M54.50, G89.29]  Referring practitioner: Khurram Kunz MD  Date of Initial Visit: Type: THERAPY  Noted: 3/1/2023  Today's Date: 3/30/2023  Patient seen for 6 sessions             Subjective Evaluation    History of Present Illness    Subjective comment: Thought I would be sore after last time but I wasn't really so maybe we can ramp it up with the ex.       Objective     AQUATIC EX:     Water Walk                 Forward x 4 laps, sideways x 3 laps (~ 4'3\" depth) - on own  March Walk                 2 laps   Stretch 1                      HS 30 ea followed by hip sweeps x 10 w/ SN under ankle   Stretch 2                      Piriformis 20 sec x 2 ea - on own   Stretch 3                     Wall 30 sec x 2 - on own  Stretch Other 1           seated piriformis stretch 20 sec ea - on own  KB push/pull  12x  UTR w/ noodle            10x ea side - on own  Open books                 10x ea using black aqualogix (opp arm anchored at rail)  Vertical Traction          LN/rail x 2 min - on own  Abdominals                 LN x 20 - on own  Hip Abd/Add                 15x ea  Hip Hip Flex/Ext           15x ea  Leg press  12x ea w/ large blue aqua ring  March in Place            15x - *deferred  STS from pool bench   15x rasing beach ball OH w/ standing  Hip circles cw/ccw      12x ea direction   Step ups with hip/knee  and opposite UE reach           12x ea, bottom pool step  Bicycle                         2 min, suspended w/ LN  Flutter/Scissor             15 / 15 - on own  Seated modified prayer stretch          10 sec x 5 forward, 10 sec x 3 to ea side  Alt UE rows                 12x ea, closed paddles  Paddle stirs  10x ea " direction, closed paddles  UE alt flex/ext  10x ea arm, open paddles  UE Hz Abd  12x, open paddles  Shoulder abd/add  12x w/ yellow foam DB     Assessment & Plan     Assessment    Assessment details: Patient reports he wasn't sore like he thought he was going to be after last time.  Continued with most previous aquatic ex/activity for mobility, flexibility, and strength/stabilization.  Increased reps on a couple of ex, progressed to green aqualogix on open books, progressed to suspended bicycle, and added leg press as well as KB push/pull, paddle stirs, shoulder abd/add, alt flex/ex, Hz abd this visit.  Patient liked new ex and could feel more challenge to his balance / stability.  He states he has noticed he is not as steady / stable with mobility / activity and his balance doesn't seem to be as good as it used to be.  Demonstration and cuing provided throughout session for optimal posture, core/glut activation, and correct form/technique with ex/activity.      Plan:  Continue with aquatic ex/activity progressing as appropriate/tolerated.  May consider adding quad/hip flexor stretch, add/groin stretch, and/or more balance / stability challenge in future sessions.                 Timed:  Aquatic Therapy    40     mins [id number];    Karen Sarkar PT  Physical Therapist    [place] License: [id number]

## 2023-04-04 ENCOUNTER — TREATMENT (OUTPATIENT)
Dept: PHYSICAL THERAPY | Facility: CLINIC | Age: 63
End: 2023-04-04
Payer: COMMERCIAL

## 2023-04-04 DIAGNOSIS — G89.29 CHRONIC BILATERAL THORACIC BACK PAIN: ICD-10-CM

## 2023-04-04 DIAGNOSIS — M41.9 SCOLIOSIS OF THORACOLUMBAR SPINE, UNSPECIFIED SCOLIOSIS TYPE: ICD-10-CM

## 2023-04-04 DIAGNOSIS — G89.29 CHRONIC BILATERAL LOW BACK PAIN WITHOUT SCIATICA: Primary | ICD-10-CM

## 2023-04-04 DIAGNOSIS — M54.50 CHRONIC BILATERAL LOW BACK PAIN WITHOUT SCIATICA: Primary | ICD-10-CM

## 2023-04-04 DIAGNOSIS — M54.6 CHRONIC BILATERAL THORACIC BACK PAIN: ICD-10-CM

## 2023-04-04 PROCEDURE — 97113 AQUATIC THERAPY/EXERCISES: CPT | Performed by: PHYSICAL THERAPIST

## 2023-04-04 NOTE — PROGRESS NOTES
"Physical Therapy 30-Day / 10-Visit Progress Note     [facility] Physical Therapy Milestone  [address] [place] [address]  [phone] (phone)  [phone] (fax)    Patient: Familia Duke   : 1960  Diagnosis/ICD-10 Code:  Chronic bilateral low back pain without sciatica [M54.50, G89.29]  Referring practitioner: Khurram Kunz MD  Date of Initial Visit: Type: THERAPY  Noted: 3/1/2023  Today's Date: 2023  Patient seen for 7 sessions      Subjective:     Clinical Progress: improved [facility]  Home Program Compliance: N/A for aquatic ex but he is doing some stretches at home  Treatment has included:  aquatic therapy    Subjective Evaluation    History of Present Illness    Subjective comment: Pain about 6/10 this morning.  I've always felt like my R leg has been weaker over the years.       Objective     Lumbar AROM:   Flexion = grossly 40-50%, then bends knees to reach farther  Extension = 10%, pain in mid/lower back  Lateral Flexion = 30% B, pain more on L > R lateral flexion   Rotation = 30-35% B some pain mid back B     MMT:  Hip Flexion:  L 5/5, R 5-/5, pain in back on R MMT  Hip Abduction (standing poolside with light UE support): 4 to 4+/5 B, pain in back on R MMT  Hip External rotation (sitting): 4+/5 B, pain in back on R   Knee Flexion: 5/5 B  Knee Extension: 5/5 B  Trunk strength:  grossly 4/5       Sitting tolerance:  In hard chair ~ 15 min, in recliner ~ 1 hour    Standing tolerance:  Reports can stand 30-45 min but only ~ 10-12 min before he starts shifting weight / adjusting position    Walking tolerance:  ~ 30-40 min before has to sit and rest    Functional Outcome Score:  Oswestry Back Index = 23/50 or 46% perceived disability (was 52% at eval)      AQUATIC EX:    Water Walk                 Forward x 4 laps, sideways x 3 laps (~ 4'3\" depth) - on  Walk                 2 laps   Stretch 1                      HS 30 ea followed by hip sweeps x 10 w/ SN under " ankle   Stretch 2                      Piriformis 20 sec x 2 ea   Stretch 3                     Wall 30 sec x 2 - on own  Stretch Other 1           seated piriformis stretch 20 sec ea - on own  KB push/pull                12x -*deferred  UTR w/ noodle            10x ea side   Open books                 10x ea using black aqualogix (opp arm anchored at rail) -*deferred  Vertical Traction          LN/rail x 2 min - on own  Abdominals                 LN x 20 - on own  Hip Abd/Add                 15x ea  Hip Hip Flex/Ext           15x ea  Leg press                    12x ea w/ large blue aqua ring  March in Place            15x - *deferred  STS from pool bench   15x rasing beach ball OH w/ standing - *deferred  Hip circles cw/ccw      12x ea direction   Step ups with hip/knee  and opposite UE reach           12x ea, bottom pool step  Bicycle                         2 min, suspended w/ LN - on own  Flutter/Scissor             15 / 15 - on own  Seated modified prayer stretch          10 sec x 5 forward, 10 sec x 3 to ea side  Alt UE rows                 15x ea, closed paddles  Paddle stirs                 12x ea direction, closed paddles  UE alt flex/ext              12x ea arm, open paddles  UE Hz Abd                  15x, open paddles  Shoulder abd/add       12x w/ yellow foam DB - *deferred         Assessment & Plan     Assessment    Assessment details: Familia Duke is a 62 y.o. M who has attended 6 aquatic therapy sessions since his evaluation on 3/1/2023 for treatment of thoracic/LBP. He has comorbidities / personal factors including hx of two lumbar surgeries, scoliosis, and working a desk job that may affect his progress in the plan of care.  He reports feeling better while in the water and states he doesn't feel as tight / seems to be getting more limber.  He indicates he has gained mobility, is waking up with less pain, and has lost a few pounds since starting aquatic therapy.  He continues to  demonstrate limited/painful trunk ROM, decreased flexibility, and core/ hip strength deficits.  His pain limits him with prolonged positioning/activity.  He has partially met 1 of 2 STGs with remaining STG and all LTGs ongoing.  Pt would benefit from therapy to help improve his ability to tolerate prolonged positioning with decreased pain.     Goals  Plan Goals: ST wks  1. Patient will be independent with education for symptom management, joint protection and strategies to minimize stress on affected tissues.  Ongoing  2. Pt to improve pain complaints to no more than 7/10 with ADLs.  Partially Met, rated pain 7/10 average with daily activities, will monitor for consistency.    LT wks  1. Pt to improve pain complaints to no more than 4/10 with ADLs.  Ongoing  2. Pt to improve trunk strength by 1/2 to 1 MMT grade.  Ongoing  3. Pt to improve Oswestry Back index score from 52% to 32% for overall functional improvement.  Ongoing  4. Pt to be independent with HEP for ROM, flexibility and core strength.  Ongoing             Recommendations: Continue as planned  Timeframe: 1 month  Prognosis to achieve goals: good    PT Signature: Karen Sarkar, PT  [place] License:  [id number]      Based upon review of the patient's progress and continued therapy plan, it is my medical opinion that Familia Duke should continue physical therapy treatment at [facility] PHYSICAL THERAPY  [address] [place] [address] [address] [place] [address] [phone].    Signature: __________________________________  Khurram Kunz MD  NPI: [id number]                                          Timed:  Aquatic therapy  [address]    42   mins

## 2023-04-06 ENCOUNTER — TREATMENT (OUTPATIENT)
Dept: PHYSICAL THERAPY | Facility: CLINIC | Age: 63
End: 2023-04-06
Payer: COMMERCIAL

## 2023-04-06 DIAGNOSIS — M54.6 CHRONIC BILATERAL THORACIC BACK PAIN: ICD-10-CM

## 2023-04-06 DIAGNOSIS — M54.50 CHRONIC BILATERAL LOW BACK PAIN WITHOUT SCIATICA: Primary | ICD-10-CM

## 2023-04-06 DIAGNOSIS — M41.9 SCOLIOSIS OF THORACOLUMBAR SPINE, UNSPECIFIED SCOLIOSIS TYPE: ICD-10-CM

## 2023-04-06 DIAGNOSIS — G89.29 CHRONIC BILATERAL THORACIC BACK PAIN: ICD-10-CM

## 2023-04-06 DIAGNOSIS — G89.29 CHRONIC BILATERAL LOW BACK PAIN WITHOUT SCIATICA: Primary | ICD-10-CM

## 2023-04-06 PROCEDURE — 97113 AQUATIC THERAPY/EXERCISES: CPT | Performed by: PHYSICAL THERAPIST

## 2023-04-06 NOTE — PROGRESS NOTES
"Physical Therapy Daily Treatment Note    [facility] Physical Therapy Milestone  [address] [place] [address]  [phone] (phone)  [phone] (fax)    Patient: Familia Duke   : 1960  Diagnosis/ICD-10 Code:  Chronic bilateral low back pain without sciatica [M54.50, G89.29]  Referring practitioner: Khurram Kunz MD  Date of Initial Visit: Type: THERAPY  Noted: 3/1/2023  Today's Date: 2023  Patient seen for 8 sessions             Subjective Evaluation    History of Present Illness    Subjective comment: Hurting today, Nothing out of the ordinary, just life.  Some days are worse than others.       Objective        AQUATIC EX:     Water Walk                 Forward x 4 laps, sideways x 3 laps (~ 4'3\" depth) - on own  March Walk                 2 laps   Stretch 1                      HS 30 ea followed by hip sweeps x 6-8 w/ SN under ankle   Stretch 2                      Piriformis 20 sec x 2 ea, gentle  Stretch 3                     Wall 30 sec x 2   Stretch Other 1           seated piriformis stretch 20 sec ea - on own  KB push/pull                12x -*deferred  UTR w/ noodle            10x ea side, comfortable range  Open books                 10x ea using black aqualogix (opp arm anchored at rail, comfortable range  Vertical Traction          LN/rail x 2 min - on own  Abdominals                 LN x 20   Hip Abd/Add                 15x ea  Hip Hip Flex/Ext           15x ea  Leg press                    15x ea w/ large blue aqua ring  March in Place            15x   STS from pool bench   15x rasing beach ball OH w/ standing - *deferred  Hip circles cw/ccw      15x ea direction   Step ups with hip/knee  and opposite UE reach           12x ea, bottom pool step  Bicycle                         2 min, suspended w/ LN   Flutter/Scissor             15 / 15 - on own  Seated modified prayer stretch          10 sec x 5 forward, 10 sec x 3 to ea side  Alt UE rows " "                15x ea, closed paddles, comfortable range  Paddle stirs                 12x ea direction, closed paddles  UE alt flex/ext              12x ea arm, open paddles, comfortable range   UE Hz Abd                  15x, open paddles , comfortable range  Shoulder abd/add       12x w/ yellow foam DB - *deferred      Assessment & Plan     Assessment    Assessment details: Patient reports he's hurting more today, some days are worse than others - denies doing anything out of the ordinary to trigger increased pain, \"just life.\"  Continued with most previous aquatic ex/activity for mobility, flexibility, and strength/stabilization.  Increased reps on a couple of ex and used black aqualogix on open books this visit.  Instructed him to reduce speed/ROM as needed on ex to keep performance in comfortable range.  Emphasis on quality of ex with gentle motion, not forcing motion if increases pain.  He did less reps on hip sweeps today secondary to increased discomfort / pulling in back during ex.   Demonstration and cuing provided throughout session for optimal posture, core/glut activation, and correct form/technique with ex/activity.  He noted less pain at end of aquatic therapy session.    Plan:  Continue with aquatic ex/activity progressing as appropriate/tolerated.  May consider adding quad/hip flexor stretch, add/groin stretch, and/or more balance / stability challenge in future sessions.                 Timed:  Aquatic Therapy    41   mins [id number];    Karen Sarkar PT  Physical Therapist    [place] License: [id number]  "

## 2023-04-11 ENCOUNTER — TREATMENT (OUTPATIENT)
Dept: PHYSICAL THERAPY | Facility: CLINIC | Age: 63
End: 2023-04-11
Payer: COMMERCIAL

## 2023-04-11 DIAGNOSIS — G89.29 CHRONIC BILATERAL LOW BACK PAIN WITHOUT SCIATICA: Primary | ICD-10-CM

## 2023-04-11 DIAGNOSIS — M54.6 CHRONIC BILATERAL THORACIC BACK PAIN: ICD-10-CM

## 2023-04-11 DIAGNOSIS — G89.29 CHRONIC BILATERAL THORACIC BACK PAIN: ICD-10-CM

## 2023-04-11 DIAGNOSIS — M41.9 SCOLIOSIS OF THORACOLUMBAR SPINE, UNSPECIFIED SCOLIOSIS TYPE: ICD-10-CM

## 2023-04-11 DIAGNOSIS — M54.50 CHRONIC BILATERAL LOW BACK PAIN WITHOUT SCIATICA: Primary | ICD-10-CM

## 2023-04-11 PROCEDURE — 97113 AQUATIC THERAPY/EXERCISES: CPT | Performed by: PHYSICAL THERAPIST

## 2023-04-11 NOTE — PROGRESS NOTES
"Physical Therapy Daily Treatment Note    [facility] Physical Therapy Milestone  [address] [place] [address]  [phone] (phone)  [phone] (fax)    Patient: Familia Duke   : 1960  Diagnosis/ICD-10 Code:  Chronic bilateral low back pain without sciatica [M54.50, G89.29]  Referring practitioner: Khurram Kunz MD  Date of Initial Visit: Type: THERAPY  Noted: 3/1/2023  Today's Date: 2023  Patient seen for 9 sessions             Subjective Evaluation    History of Present Illness    Subjective comment: Pain not too bad this morning, still stiff.       Objective     AQUATIC EX:     Water Walk                 Forward x 4 laps, sideways x 3 laps (~ 4'3\" depth) - on own  March Walk                 2 laps   Stretch 1                      HS 30 ea followed by hip sweeps x 10 (comfortable range) w/ SN under ankle   Stretch 2                      Piriformis 20 sec x 2 ea, gentle  Stretch 3                     Wall 30 sec x 2   Stretch Other 1           seated piriformis stretch 20 sec ea - on own  KB push/pull                12x -*deferred  UTR w/ noodle            10x ea side, comfortable range  Open books                 10x ea using black aqualogix (opp arm anchored at rail, comfortable range)  Vertical Traction          LN/rail x 2 min - on own  Abdominals                 LN x 20   Hip Abd/Add                 15x ea  Hip Hip Flex/Ext           15x ea  Leg press                    15x ea w/ large blue aqua ring  March in Place            15x   STS from pool bench   15x rasing beach ball OH w/ standing - *deferred  Hip circles cw/ccw      15x ea direction   Step ups with hip/knee  and opposite UE reach           12x ea, bottom pool step  Bicycle                         2 min, suspended w/ LN   Flutter/Scissor             15 / 15 - on own  Seated modified prayer stretch          10 sec x 5 forward, 10 sec x 3 to ea side  Alt UE rows                 15x ea, closed paddles, " comfortable range  Paddle stirs                 15x ea direction, closed paddles  UE alt flex/ext              15x ea arm, open paddles, comfortable range   UE Hz Abd                  15x, open paddles , comfortable range  Shoulder abd/add       12x w/ yellow foam DB - *deferred            Assessment & Plan     Assessment    Assessment details: Patient reports his pain is not too bad but he still has stiffness / tightness.  Continued with most previous aquatic ex/activity for mobility, flexibility, and strength/stabilization.  Increased reps on a couple of ex this visit.  Emphasis on gentle gentle motion with gradual increase as able but not to push into pain.  Instructed him to focus on goo0d quality /form and control with ex performance to minimize compensation /strain on spine.  Demonstration and cuing provided throughout session for optimal posture, core/glut activation, and correct form/technique with ex/activity.      Plan:  Continue with aquatic ex/activity progressing as appropriate/tolerated.  May consider adding quad/hip flexor stretch, add/groin stretch, and/or more balance / stability challenge in future sessions.                 Timed:  Aquatic Therapy    38     mins [id number];    Karen Sarkar PT  Physical Therapist    [place] License: [id number]

## 2023-04-13 ENCOUNTER — TREATMENT (OUTPATIENT)
Dept: PHYSICAL THERAPY | Facility: CLINIC | Age: 63
End: 2023-04-13
Payer: COMMERCIAL

## 2023-04-13 DIAGNOSIS — G89.29 CHRONIC BILATERAL THORACIC BACK PAIN: ICD-10-CM

## 2023-04-13 DIAGNOSIS — M54.50 CHRONIC BILATERAL LOW BACK PAIN WITHOUT SCIATICA: Primary | ICD-10-CM

## 2023-04-13 DIAGNOSIS — G89.29 CHRONIC BILATERAL LOW BACK PAIN WITHOUT SCIATICA: Primary | ICD-10-CM

## 2023-04-13 DIAGNOSIS — M54.6 CHRONIC BILATERAL THORACIC BACK PAIN: ICD-10-CM

## 2023-04-13 DIAGNOSIS — M41.9 SCOLIOSIS OF THORACOLUMBAR SPINE, UNSPECIFIED SCOLIOSIS TYPE: ICD-10-CM

## 2023-04-13 PROCEDURE — 97113 AQUATIC THERAPY/EXERCISES: CPT | Performed by: PHYSICAL THERAPIST

## 2023-04-13 NOTE — PROGRESS NOTES
"Physical Therapy Daily Treatment Note    [facility] Physical Therapy Milestone  [address] [place] [address]  [phone] (phone)  [phone] (fax)    Patient: Familia Duke   : 1960  Diagnosis/ICD-10 Code:  Chronic bilateral low back pain without sciatica [M54.50, G89.29]  Referring practitioner: Khurram Kunz MD  Date of Initial Visit: Type: THERAPY  Noted: 3/1/2023  Today's Date: 2023  Patient seen for 10 sessions             Subjective Evaluation    History of Present Illness    Subjective comment: Doing okay, I think I only have one more pool visit scheduled before I go back to land PT.  My daughter has a pool I can use once she opens it for the summer but it's not heated.       Objective     AQUATIC EX:     Water Walk                 Forward x 4 laps, sideways x 3 laps (~ 4'3\" depth) - on own  March Walk                 2 laps   Tandem Walk  1 lap w/ noodle support  Stretch 1                      HS 30 ea followed by hip sweeps x 10 (comfortable range) w/ SN under ankle   Stretch 2                      Piriformis 20 sec x 2 ea, gentle  Stretch 3                     Wall 30 sec x 2   Stretch Other 1           seated piriformis stretch 20 sec ea  KB push/pull                15x   UTR w/ noodle            10x ea side, comfortable range  Open books                 10x ea using black aqualogix (opp arm anchored at rail, comfortable range)  Vertical Traction          LN/rail x 2 min - on own  Abdominals                 LN x 20   Hip Abd/Add                 15x ea  Hip Hip Flex/Ext           15x ea, small kick backward w/ gluteal squeeze  Leg press                    20x ea w/ large blue aqua ring  STS from pool bench   15x rasing 4# ball OH w/ standing  Hip circles cw/ccw      15x ea direction   Step ups with hip/knee  and opposite UE reach           15x ea, bottom pool step  Bicycle                         2 min, suspended w/ LN   Flutter/Scissor             15 / " 15 - on own  Seated modified prayer stretch          10 sec x 5 forward, 10 sec x 3 to ea side  Alt UE rows                 15x ea using closed paddles and 15x ea using yellow hydrotones, comfortable range  Paddle stirs                 15x ea direction using closed paddles and 10x ea using  yellow hydrotone  UE alt flex/ext              15x ea arm, closed paddles, comfortable range   UE Hz Abd                  15x, closed paddles , comfortable range  Shoulder abd/add       15x B and 10x ea alt arm w/ yellow foam DB  Deep water jacks 1 min   Cross country ski  1 min   Plank alt rotations 5x ea side (plank position w/ hands on pool deck w/ trunk rotations + UE Hz abd)      Assessment & Plan     Assessment    Assessment details: Patient reports doing okay and feels like the therapy has helped decrease his pain some and loosen him up but he still feels stiff/tight.  Continued with most previous aquatic ex/activity for mobility, flexibility, strength/stabilization, and balance.  Increased reps on a couple of ex, used 4# ball instead of beach ball on OH raise w/ STS, progressed resistance on some UE/core ex, and added plank rotations, tandem walk, deep water light jog, jacks, cross country this visit.  He was challenged with tandem walking and needing noodle support to help with balance.  He also had difficulty with coordinating opp UE/LE movement for cross country ski.  Instructed him to focus on engaging abdominals and performing ex/activiy with controlled movement in comfortable range to minimize compensation / strain on spine.  Demonstration and cuing provided throughout session for optimal posture, core/glut activation, and correct form/technique with ex/activity.      Plan:  One more aquatic session to finalize aquatic HEP, then will transition to land PT.  Consider adding quad/hip flexor stretch, golfer's lift motion w/ noodle push down, plank with alt UE/LE, lateral plank walk, uni clock, and/or SLS.                  Timed:  Aquatic Therapy    55     mins [id number];    Karen Sarkar, PT  Physical Therapist    [place] License: [id number]

## 2023-04-20 ENCOUNTER — TREATMENT (OUTPATIENT)
Dept: PHYSICAL THERAPY | Facility: CLINIC | Age: 63
End: 2023-04-20
Payer: COMMERCIAL

## 2023-04-20 DIAGNOSIS — G89.29 CHRONIC BILATERAL LOW BACK PAIN WITHOUT SCIATICA: Primary | ICD-10-CM

## 2023-04-20 DIAGNOSIS — M54.6 CHRONIC BILATERAL THORACIC BACK PAIN: ICD-10-CM

## 2023-04-20 DIAGNOSIS — G89.29 CHRONIC BILATERAL THORACIC BACK PAIN: ICD-10-CM

## 2023-04-20 DIAGNOSIS — M54.50 CHRONIC BILATERAL LOW BACK PAIN WITHOUT SCIATICA: Primary | ICD-10-CM

## 2023-04-20 DIAGNOSIS — M41.9 SCOLIOSIS OF THORACOLUMBAR SPINE, UNSPECIFIED SCOLIOSIS TYPE: ICD-10-CM

## 2023-04-20 PROCEDURE — 97113 AQUATIC THERAPY/EXERCISES: CPT | Performed by: PHYSICAL THERAPIST

## 2023-04-20 NOTE — PROGRESS NOTES
"Physical Therapy Daily Treatment Note    [facility]  [address] [place] [address]  [phone] (phone)  [phone] (fax)    Patient: Familia Duke   : 1960  Diagnosis/ICD-10 Code:  Chronic bilateral low back pain without sciatica [M54.50, G89.29]  Referring practitioner: Khurram Kunz MD  Date of Initial Visit: Type: THERAPY  Noted: 3/1/2023  Today's Date: 2023  Patient seen for 11 sessions             Subjective Evaluation    History of Present Illness    Subjective comment: Pain 8/10 in thoracic spine.  Back pain is a stabbing pain.  Back went out on me over weekend - didn't do anything, was just walking.  Hot showers help.  Have appt with pain doctor on Monday.         Objective     AQUATIC EX:     Water Walk                 Forward x 4 laps, sideways x 3 laps (~ 4'3\" depth), gentle arm movements  March Walk                 2 laps   Tandem Walk              1 lap w/ noodle support  Stretch 1                      HS 30 ea followed by hip sweeps x 10 (comfortable range) w/ SN under ankle   Stretch 2                      Piriformis 20 sec x 2 ea, gentle - *deferred 2/2 pain trying to pull across with UE  Stretch 3                     Wall 30 sec x 2   Stretch Other 1           seated piriformis stretch 20 sec ea  KB push/pull                15x - *deferred  UTR w/ noodle            10x ea side, comfortable range  Open books                 10x ea, hands only 2/2 increased pain (opp arm anchored at rail, comfortable range)  Vertical Traction          LN/rail x 2 min - on own  Abdominals                 LN x 20, moved a little shallower today 2/2 increased pain   Hip Abd/Add                 15x ea  Hip Hip Flex/Ext           15x ea, small kick backward w/ gluteal squeeze  Leg press                    20x ea w/ large blue aqua ring  STS from pool bench   15x (without rasing 4# ball OH w/ standing)  Hip circles cw/ccw      15x ea direction   Step " ups with hip/knee  and opposite UE reach           15x ea, bottom pool step  Bicycle                         2 min, suspended w/ LN   Flutter/Scissor             15 / 15 - on own  Seated modified prayer stretch          5-10 sec x 5 forward, Held 10 sec x 3 to ea side 2/2 pain  Alt UE rows                 15x ea using closed paddles and 15x ea using yellow hydrotones, comfortable range - *deferred  Paddle stirs                 15x ea direction using closed paddles and 10x ea using  yellow hydrotone - *deferred  UE alt flex/ext              15x ea arm, hands only, comfortable range - *deferred  UE Hz Abd                  15x, hands only, comfortable range - * deferred  Shoulder abd/add       15x B and 10x ea alt arm w/ yellow foam DB - *deferred  Deep water light jog 1 min  Deep water jacks        1 min   Cross country ski        1 min - *deferred  Plank alt rotations       5x ea side (plank position w/ hands on pool deck w/ trunk rotations + UE Hz abd) - *deferred            Assessment & Plan     Assessment    Assessment details: Patient reports back went out on him over weekend, rates pain 8/10 and describes as sharp pain w/ certain movements.  Continued with most previous aquatic ex/activity for mobility, flexibility, strength/stabilization, and balance.  Held / deferred some ex and /or modified UE/core and ROM ex as necessary to avoid increased pain during ex.  Attempted lateral trunk stretch (with OH reach and in side plank position at rail) however he noted sharp pains when he's coming out of stretches so stopped.  Instructed him to focus on egaging abdominals and performing ex/activiy with controlled movement in comfortable range to minimize compensation / strain on spine.  Demonstration and cuing provided throughout session for optimal posture, core/glut activation, and correct form/technique with ex/activity.  He reports hot shower helps so had him sit at jets in hot pool at end of session for muscle  relaxation.    Plan:  Patient has appt with pain management on Monday and is scheduled to transition to land PT in May.  Will await MD recommendations regarding continued therapy.                 Timed:  Aquatic Therapy    39     mins [id number];    Karen Sarkar PT  Physical Therapist    [place] License: [id number]

## 2023-04-24 ENCOUNTER — PREP FOR SURGERY (OUTPATIENT)
Dept: SURGERY | Facility: SURGERY CENTER | Age: 63
End: 2023-04-24
Payer: COMMERCIAL

## 2023-04-24 ENCOUNTER — OFFICE VISIT (OUTPATIENT)
Dept: PAIN MEDICINE | Facility: CLINIC | Age: 63
End: 2023-04-24
Payer: COMMERCIAL

## 2023-04-24 VITALS
TEMPERATURE: 97.8 F | HEIGHT: 68 IN | HEART RATE: 74 BPM | WEIGHT: 237.6 LBS | DIASTOLIC BLOOD PRESSURE: 83 MMHG | OXYGEN SATURATION: 95 % | BODY MASS INDEX: 36.01 KG/M2 | SYSTOLIC BLOOD PRESSURE: 128 MMHG

## 2023-04-24 DIAGNOSIS — M47.814 FACET ARTHROPATHY, THORACIC: ICD-10-CM

## 2023-04-24 DIAGNOSIS — M47.814 THORACIC SPONDYLOSIS WITHOUT MYELOPATHY: Primary | ICD-10-CM

## 2023-04-24 DIAGNOSIS — M54.6 PAIN IN THORACIC SPINE: ICD-10-CM

## 2023-04-24 PROCEDURE — 99214 OFFICE O/P EST MOD 30 MIN: CPT | Performed by: NURSE PRACTITIONER

## 2023-04-24 RX ORDER — SODIUM CHLORIDE 0.9 % (FLUSH) 0.9 %
10 SYRINGE (ML) INJECTION EVERY 12 HOURS SCHEDULED
OUTPATIENT
Start: 2023-04-24

## 2023-04-24 RX ORDER — SODIUM CHLORIDE 0.9 % (FLUSH) 0.9 %
10 SYRINGE (ML) INJECTION AS NEEDED
OUTPATIENT
Start: 2023-04-24

## 2023-04-24 RX ORDER — PREGABALIN 50 MG/1
50 CAPSULE ORAL 2 TIMES DAILY
Qty: 60 CAPSULE | Refills: 0 | Status: SHIPPED | OUTPATIENT
Start: 2023-04-24

## 2023-04-24 NOTE — PROGRESS NOTES
CHIEF COMPLAINT    Follow up back pain - the pain has worsened since last visit.     Subjective   Familia Duke is a 62 y.o. male  who presents for follow-up.  He has a history of back pain.  Patient was last seen by myself on 2/1/2022 with worsening mid back pain.  At that time plan was to repeat the left T9-T11 RFA which previously provided him with 95% relief x8 months.  It appears that this was denied on appeal by insurance due to this not being a covered benefit.    Today his pain is 8/10VAS in severity. His primary pain complaint is his mid-back pain. This pain is continuous aching, burning, sharp, stabbing pain that waxes and wanes in severity. This pain is worsened by bending, twisting, and position. It is improved with rest and Thoracic RFA. He has previously tried treating his pain with NSAIDs, heat, home exercises, aquatic therapy, and tylenol.     He has been participating in aquatic therapy and will now start traditional PT. His pain is now worsening and affecting his quality of life.     Previously trialed Gabapentin with no benefit.     Procedure list:  3/24/2021-bilateral T9-T11 MBB-80% relief x1 day  7/30/2020-left T9-T11 RFA-95% relief x 8 months  6/12/2020-left T9-T11 MBB-80% relief x1 day  3/18/2020-left T7-T10 MBB-80% relief x1 day    Back Pain  This is a chronic problem. The current episode started more than 1 year ago. The problem occurs constantly. The problem has been gradually worsening since onset. The pain is present in the thoracic spine. The quality of the pain is described as aching and stabbing. The pain does not radiate. The pain is at a severity of 8/10. The symptoms are aggravated by bending, twisting, standing and position (prolonged walking). Associated symptoms include numbness (mid back) and weakness (generalized). Pertinent negatives include no abdominal pain, chest pain, dysuria, fever or headaches. Risk factors include obesity, poor posture and lack of exercise. He has  tried analgesics, NSAIDs, heat and home exercises (tylenol, aleve) for the symptoms. The treatment provided significant (RFL) relief.      PEG Assessment   What number best describes your pain on average in the past week?9  What number best describes how, during the past week, pain has interfered with your enjoyment of life?10  What number best describes how, during the past week, pain has interfered with your general activity?  10    Review of Pertinent Medical Data ---  POSTMYELOGRAM CT OF THE CERVICAL AND THORACIC SPINE 02/03/2020     CLINICAL HISTORY: Cervical and thoracic radiculitis and cervical and  thoracic pain.     CERVICAL THORACIC MYELOGRAM TECHNIQUE: Dr. Hernandez from radiology  performed lumbar puncture and administered 10 mL of Isovue-M 300 into  the lumbar thecal sac, and subsequently ran the contrast to the cervical  thecal sac, at which time AP bilateral obliques lateral and swimmer's  lateral views were obtained of the cervical spine, subsequently AP and  lateral views were obtained of the upper and lower thoracic spine.     FINDINGS ON MYELOGRAM: Unfortunately there is very little contrast that  extended into the cervical and thoracic thecal sac, and this is a  nondiagnostic myelogram. Cervical spinal cord and cervical nerve sleeves  are not assessed nor is the thoracic cord or thecal sac. 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     POSTMYELOGRAM CT OF THE CERVICAL SPINE TECHNIQUE: Following myelogram,  spiral CT images were obtained from the skull base down to the T2  thoracic level,  and images were reformatted and submitted in 2 mm thick  axial CT section with bone algorithm, and 2 mm thick  axial, sagittal and  coronal CT sections with soft tissue algorithm.     COMPARISON: This is correlated to prior cervical spine MRI from [facility] on 07/04/2019.     FINDINGS: Craniocervical junction is normal in appearance. There are  very minimal arthritic changes at the atlantodental interval. Otherwise,  C1-C2 level is normal in appearance. Furthermore, at C2-C3, disc space,  facets and uncovertebral joints are normal with no canal or foraminal  narrowing.     At C3-C4, there is mild disc space narrowing, there are degenerative  endplate changes, diffuse posterior disc osteophyte complex, eccentric  right posterolateral and mildly narrows the right lateral aspect of the  canal, spurs abut the ventral aspect of the ventral root to the right C4  nerve root as it extends from the cord into the right neural foramen,  and also uncovertebral joint spurs result in moderate right bony  foraminal narrowing at C3-C4. There is no narrowing of the central and  left-sided canal or left foramen at C3-C4.     At C4-C5, there is very minimal posterior spurring, bulging disc  material results in minimal if any narrowing of the thecal sac. There is  mild right facet overgrowth, left facets and uncovertebral joints are  normal, and there is essentially no foraminal narrowing.     At C5-C6, there is mild disc space narrowing and degenerative endplate  changes, minimal posterior spurring, but no canal narrowing. The facets  and uncovertebral joints are within normal limits and there is no  foraminal narrowing.     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 The vertebrae are hypoplastic and fused as are the posterior  elements. There is no canal or foraminal narrowing at C6-C7.     At C7-T1, there is a posterior central and right paracentral disc  herniation abuts the ventral aspect of the spinal cord minimally  narrowing the canal.  There is mild-to-moderate right facet  overgrowth.  The left facets are normal. There is mild right, but no left foraminal  narrowing.     IMPRESSION:  1. No change since cervical spine MRI 07/04/2019.  2. 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  3. Mild cervical spondylosis as described at C3-C4, right posterior  lateral disc osteophyte complex mildly narrows right lateral aspect of  the canal, abuts the ventral aspect of the ventricle root right C4 nerve  root as it extends from the cord toward the right foramen, right  uncovertebral joint hypertrophy moderately narrows the right foramen,  abuts the right C4 nerve root as it exits the right foramen at C3-C4.  4. At C7-T1, there is a posterior central right paracentral disc  herniation that abuts the ventral aspect of the cervical spinal cord  mildly narrowing the central portion of the canal, and there is right  facet overgrowth with mild right bony foraminal narrowing C7-T1. The  remainder of the postmyelogram CT of the cervical spine is unremarkable.     POSTMYELOGRAM CT OF THORACIC SPINE TECHNIQUE: Following myelogram,  spiral CT images were obtained from the C7 cervical level down to the L3  lumbar level and images were reformatted and submitted in 2 mm thick  axial CT sections with bone algorithm and 2 mm thick axial, sagittal and  coronal CT sections with soft tissue algorithm.     FINDINGS: There is scoliotic curvature of the thoracic spine with a  dextroscoliotic curvature of the upper to mid thoracic spine apex at the  T6-T7 level, levoscoliotic curvature mid thoracic spine apex at the  T9-T10 level and a dextroscoliotic curvature of the lower thoracic and  upper lumbar spine apex at the T12-L1 lumbar level. There are multiple  Schmorl's nodes indenting the endplates in the mid and lower thoracic  spine including the inferior endplate of T7-T8 endplates at  T9-T10,  T11-T12. 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 There is also  some flowing ossification along the anterior endplates from T10 to L2,  likely serving to partially fuse the thoracic spine anteriorly from T10  to L2. There is a prominent posterior epidural fat and lateral epidural  fat in the thoracic spine from T5 down to T11 resulting in somewhat  small size to the thoracic thecal sac. Reidentified is a posterior  central right paracentral disc herniation C7-T1 abuts the ventral  surface cord mildly narrowing the canal. There is mild right bony  foraminal narrowing.     At T1-T2, T2-T3, T3-T4, posterior disc margin and facets are normal with  no canal or foraminal narrowing.     At T4-T5, there is right facet overgrowth, facet spurs mildly narrow the  right foramen. There is minimal posterior central disc bulge, but there  is no central canal or left foraminal narrowing.     At T5-T6, there is right facet overgrowth, facet spurs mildly narrowing  the right foramen. Posterior disc margin and facets are normal. There is  no canal or foraminal narrowing.     At T6-T7, there is left facet overgrowth, left facet spurs mild to  moderately narrows the left foramen. Posterior disc margin is normal  with no canal or right foraminal narrowing.     At T7-T8, left facet spurs mild to moderately narrows the left foramen.  Posterior disc margin and right facets are normal. There is no canal or  right foraminal narrowing.     At T8-T9, facet spurs extending into the posterior superior foramen.  There is only mild-to-moderate bilateral foraminal narrowing. There is  prominent posterior epidural fat resulting in small-sized thecal sac.  There is essentially no canal narrowing.     At T9-T10, there is narrowed disc space, may be some bony bridging  across the endplates, some  flowing ossification along the right  anterolateral aspect of the endplates, likely fusing the T9-T10  vertebrae. Posterior disc margin and facets are normal with no canal or  foraminal narrowing.     At T10-T11, there is some bony bridging across endplates, flowing  ossification in the anterior endplates serving to fuse the vertebrae  anteriorly. Posterior disc margin and facets are normal. There is no  canal or foraminal narrowing.     At T11-T12, there is flowing ossification fusing the vertebrae  anteriorly. Posterior disc margin and facets are normal.There is  essentially no canal or foraminal narrowing.     At T12-L1, posterior disc margin is normal. There is no canal narrowing.  There is facet spurring into the left foramen, mild-to-moderate left  foraminal narrowing. There is no right foraminal narrowing.      IMPRESSION:  1. There are multiple Schmorl's nodes indenting the endplates from T7  down to T12 with some anterior wedging from T9 to T12 suggesting a form  of Scheuermann's involving the lower thoracic spine and there are some  flowing ossification along the anterior vertebrae from T9 to L2 serving  to at least partially fuse the vertebrae anteriorly from T9 to L2.  2. There is prominent posterior and right and left lateral epidural fat  throughout the thoracic spine from T5 down to T11 resulting in somewhat  small size to the thoracic thecal sac, but no thoracic disc herniation  and no thoracic central canal narrowing is seen.  3. There is some facet spurring into the neural foramina resulting in  multilevel foraminal narrowing of the thoracic spine including mild  right foraminal narrowing at T4-T5 and T5-T6, moderate left foraminal  narrowing at T6-T7, and left foraminal narrowing at T7-T8, moderate  bilateral foraminal narrowing at T8-T9.     Radiation dose reduction techniques were utilized, including automated  exposure control and exposure modulation based on body size.     This report was  "finalized on 2/4/2020 11:40 AM by Dr. Kit Cordero M.D.    The following portions of the patient's history were reviewed and updated as appropriate: allergies, current medications, past family history, past medical history, past social history, past surgical history and problem list.    Review of Systems   Constitutional: Negative for chills, fatigue and fever.   Respiratory: Negative for cough and shortness of breath.    Cardiovascular: Negative for chest pain.   Gastrointestinal: Negative for abdominal pain, constipation and diarrhea.   Genitourinary: Negative for difficulty urinating and dysuria.   Musculoskeletal: Positive for back pain.   Neurological: Positive for weakness (generalized) and numbness (mid back). Negative for dizziness, light-headedness and headaches.   Psychiatric/Behavioral: Positive for sleep disturbance (pain). Negative for suicidal ideas.     --  The aforementioned information the Chief Complaint section and above subjective data including any HPI data, and also the Review of Systems data, has been personally reviewed and affirmed.  --    Vitals:    04/24/23 0912   BP: 128/83   BP Location: Left arm   Patient Position: Sitting   Pulse: 74   Temp: 97.8 °F (36.6 °C)   TempSrc: Temporal   SpO2: 95%   Weight: 108 kg (237 lb 9.6 oz)   Height: 172.7 cm (67.99\")   PainSc:   8   PainLoc: Back     Objective   Physical Exam  Vitals and nursing note reviewed.   Constitutional:       Appearance: Normal appearance. He is well-developed.   Eyes:      General: Lids are normal.   Cardiovascular:      Rate and Rhythm: Normal rate.   Pulmonary:      Effort: Pulmonary effort is normal.   Musculoskeletal:      Cervical back: Normal range of motion.      Thoracic back: Tenderness and bony tenderness present. Decreased range of motion.   Neurological:      Mental Status: He is alert and oriented to person, place, and time.   Psychiatric:         Attention and Perception: Attention normal.         Mood and " Affect: Mood normal.         Speech: Speech normal.         Behavior: Behavior normal.         Judgment: Judgment normal.       Assessment & Plan   Diagnoses and all orders for this visit:    1. Thoracic spondylosis without myelopathy (Primary)  -     pregabalin (LYRICA) 50 MG capsule; Take 1 capsule by mouth 2 (Two) Times a Day.  Dispense: 60 capsule; Refill: 0    2. Pain in thoracic spine  -     pregabalin (LYRICA) 50 MG capsule; Take 1 capsule by mouth 2 (Two) Times a Day.  Dispense: 60 capsule; Refill: 0    3. Facet arthropathy, thoracic      Thermal Radiofrequency Destruction    This repeat thermal radiofrequency destruction (RFA) of cervical, thoracic, or lumbar paravertebral facet joint (medial branch) nerves at the same anatomical site is considered medically reasonable and necessary as this patient has met the criteria of having a minimum of consistent 50% improvement in pain for at least six (6) months or at least 50% consistent improvement in the ability to perform previously painful movements and ADLs as compared to baseline measurement using the same scale.    Pain / Disability Scale    The scale used for measurement of pain and/or disability for this patient was the [place] back pain disability scale.  The score was 57 on 04/24/2023    --- Trial Lyrica, he is concerned about the side effect of drowsiness. Will start this medication at the low dose of 50 mg nightly x 7 nights, then increase to BID  --- As Mr. Duke has previously reported 95% relief lasting 8 months to his mid back pain following a Left T9-T11 RFA It is reasonable and prudent to repeat this procedure as his pain is now affecting his quality of life. It is much more prudent and appropriate to treat this pain with an interventional procedure which has been shown to be effective in this patient then to consider opioids.     Repeat Left T9-T11 RFA  Reviewed the procedure at length with the patient.  Included in the review was expectations,  complications, risk and benefits.The procedure was described in detail and the risks, benefits and alternatives were discussed with the patient (including but not limited to: bleeding, infection, nerve damage, worsening of pain, inability to perform injection, paralysis, seizures, coma, no pain relief and death) who agreed to proceed.  Discussed the potential for sedation if warranted/wanted.  The procedure will plan to be performed at [facility] with fluoroscopic guidance(unless ultrasound is indicated) and could potentially have steroids and contrast dye used. Questions were answered and in a way the patient could understand.  Patient verbalized understanding and wishes to proceed.  This intervention will be ordered.  Discussed with patient that all procedures are part of a multimodal plan of care and include either formal PT or a home exercise program.  Patient has no evidence of coagulopathy or current infection.    --- Follow-up after procedure     MILO REPORT  As part of the patient's treatment plan, I am prescribing controlled substances. The patient has been made aware of appropriate use of such medications, including potential risk of somnolence, limited ability to drive and/or work safely, and the potential for dependence or overdose. It has also been made clear that these medications are for use by this patient only, without concomitant use of alcohol or other substances unless prescribed.     Patient has completed prescribing agreement detailing terms of continued prescribing of controlled substances, including monitoring MILO reports, urine drug screening, and pill counts if necessary. The patient is aware that inappropriate use will results in cessation of prescribing such medications.    As the clinician, I personally reviewed the MILO from 4/24/2023 while the patient was in the office today.    History and physical exam exhibit continued safe and appropriate use of  controlled substances.    Dictated utilizing Dragon dictation.

## 2023-05-01 ENCOUNTER — OFFICE VISIT (OUTPATIENT)
Dept: SPORTS MEDICINE | Facility: CLINIC | Age: 63
End: 2023-05-01
Payer: COMMERCIAL

## 2023-05-01 ENCOUNTER — TREATMENT (OUTPATIENT)
Dept: PHYSICAL THERAPY | Facility: CLINIC | Age: 63
End: 2023-05-01
Payer: COMMERCIAL

## 2023-05-01 ENCOUNTER — LAB (OUTPATIENT)
Dept: LAB | Facility: HOSPITAL | Age: 63
End: 2023-05-01
Payer: COMMERCIAL

## 2023-05-01 VITALS
HEIGHT: 68 IN | WEIGHT: 229 LBS | HEART RATE: 88 BPM | SYSTOLIC BLOOD PRESSURE: 108 MMHG | OXYGEN SATURATION: 98 % | DIASTOLIC BLOOD PRESSURE: 60 MMHG | BODY MASS INDEX: 34.71 KG/M2 | TEMPERATURE: 98 F

## 2023-05-01 DIAGNOSIS — M41.9 SCOLIOSIS OF THORACOLUMBAR SPINE, UNSPECIFIED SCOLIOSIS TYPE: ICD-10-CM

## 2023-05-01 DIAGNOSIS — G89.29 CHRONIC BILATERAL THORACIC BACK PAIN: ICD-10-CM

## 2023-05-01 DIAGNOSIS — M54.50 CHRONIC BILATERAL LOW BACK PAIN WITHOUT SCIATICA: Primary | ICD-10-CM

## 2023-05-01 DIAGNOSIS — G89.29 CHRONIC BILATERAL LOW BACK PAIN WITHOUT SCIATICA: Primary | ICD-10-CM

## 2023-05-01 DIAGNOSIS — F41.9 ANXIETY: ICD-10-CM

## 2023-05-01 DIAGNOSIS — M51.36 DDD (DEGENERATIVE DISC DISEASE), LUMBAR: ICD-10-CM

## 2023-05-01 DIAGNOSIS — E11.9 TYPE 2 DIABETES MELLITUS WITHOUT COMPLICATION, WITHOUT LONG-TERM CURRENT USE OF INSULIN: ICD-10-CM

## 2023-05-01 DIAGNOSIS — M51.34 DDD (DEGENERATIVE DISC DISEASE), THORACIC: ICD-10-CM

## 2023-05-01 DIAGNOSIS — M54.6 CHRONIC BILATERAL THORACIC BACK PAIN: ICD-10-CM

## 2023-05-01 DIAGNOSIS — E78.2 MIXED HYPERLIPIDEMIA: Primary | ICD-10-CM

## 2023-05-01 LAB
ALBUMIN SERPL-MCNC: 4.5 G/DL (ref 3.5–5.2)
ALBUMIN/GLOB SERPL: 1.4 G/DL
ALP SERPL-CCNC: 99 U/L (ref 39–117)
ALT SERPL W P-5'-P-CCNC: 29 U/L (ref 1–41)
ANION GAP SERPL CALCULATED.3IONS-SCNC: 13.3 MMOL/L (ref 5–15)
AST SERPL-CCNC: 31 U/L (ref 1–40)
BILIRUB SERPL-MCNC: 0.7 MG/DL (ref 0–1.2)
BUN SERPL-MCNC: 12 MG/DL (ref 8–23)
BUN/CREAT SERPL: 12.4 (ref 7–25)
CALCIUM SPEC-SCNC: 9.7 MG/DL (ref 8.6–10.5)
CHLORIDE SERPL-SCNC: 102 MMOL/L (ref 98–107)
CHOLEST SERPL-MCNC: 98 MG/DL (ref 0–200)
CO2 SERPL-SCNC: 23.7 MMOL/L (ref 22–29)
CREAT SERPL-MCNC: 0.97 MG/DL (ref 0.76–1.27)
EGFRCR SERPLBLD CKD-EPI 2021: 87.2 ML/MIN/1.73
GLOBULIN UR ELPH-MCNC: 3.3 GM/DL
GLUCOSE SERPL-MCNC: 211 MG/DL (ref 65–99)
HBA1C MFR BLD: 7.7 % (ref 4.8–5.6)
HDLC SERPL QL: 4.9
HDLC SERPL-MCNC: 20 MG/DL (ref 40–60)
LDLC SERPL CALC-MCNC: 26 MG/DL (ref 0–100)
POTASSIUM SERPL-SCNC: 4 MMOL/L (ref 3.5–5.2)
PROT SERPL-MCNC: 7.8 G/DL (ref 6–8.5)
SODIUM SERPL-SCNC: 139 MMOL/L (ref 136–145)
TRIGL SERPL-MCNC: 364 MG/DL (ref 0–150)
VLDLC SERPL-MCNC: 52 MG/DL (ref 5–40)

## 2023-05-01 PROCEDURE — 83036 HEMOGLOBIN GLYCOSYLATED A1C: CPT | Performed by: FAMILY MEDICINE

## 2023-05-01 PROCEDURE — 80061 LIPID PANEL: CPT | Performed by: FAMILY MEDICINE

## 2023-05-01 PROCEDURE — 36415 COLL VENOUS BLD VENIPUNCTURE: CPT | Performed by: FAMILY MEDICINE

## 2023-05-01 PROCEDURE — 80053 COMPREHEN METABOLIC PANEL: CPT | Performed by: FAMILY MEDICINE

## 2023-05-01 NOTE — PROGRESS NOTES
Physical Therapy Daily Treatment Note    Patient: Familia Duke   : 1960  Diagnosis/ICD-10 Code:  Chronic bilateral low back pain without sciatica [M54.50, G89.29]  Referring practitioner: Khurram Kunz MD  Date of Initial Visit: Type: THERAPY  Noted: 3/1/2023  Today's Date: 2023  Patient seen for 12 sessions    [facility] [place] [address]  [phone] (phone)  [phone] (fax)         Subjective still having significant L sided midback pain, they won't approve the ablation procedure    Objective     Exercises/Therapeutic activity:  -R SL open book, 10x 5 sec  -rolling on Rolga for mid back muscles (had to stop, got cramping in his hamstrings)  -trigger point release with cane  -discussion of his increased kyphosis (showed him his x-rays), some posture can be helped to strengthen and not get worse     Manual:  Pt in R SL. STM to L thoracic PVMs, posterior RC, UT, and levator muscles with hands and smooth end of the  hand tool, also used knob end for trigger point release of the L UT, levator, and mid thoracic PVMs, light PA springing on the midthoracic ribs    Assessment/Plan  Watson has transitioned from aquatic to land based therapy. He has not had much lasting relief from therapy to date. He was working out at the gym, but recently stopped due to increased pain. He has an increased thoracic kyphosis, scoliosis, and tightness of the L thoracic PVMs, UT, levator and posterior RC with trigger points. At the end of the session today, he had very little pain, none while in R SL.          Timed:    Manual Therapy:    30     mins  08068;  Therapeutic Exercise:         mins  49791;     Neuromuscular Nichole:        mins  42124;    Therapeutic Activity:     12     mins  11542;     Gait Training:           mins  58895;     Ultrasound:          mins  28353;    Electrical Stimulation:         mins  03592 ( );  Iontophoresis          mins 40586;  Aquatic Therapy         mins 09058;      Untimed:  Electrical Stimulation:         mins  93765 ( );  Traction:         mins  03684;   Dry Needling   (1-2 muscles)             mins 20560 (Self-pay)  Dry Needling (3-4 muscles)           mins 20561 (Self-pay)  Dry Needling Trial              mins DRYNDLTRIAL  (No Charge)    Timed Treatment:   42   mins   Total Treatment:     42   mins    Charlotte Bailon PT, CDNT  Physical Therapist    [place] License:[id number]

## 2023-05-01 NOTE — PROGRESS NOTES
"Familia is a 62 y.o. year old male    Chief Complaint   Patient presents with   • Hyperlipidemia     4 month follow up on diabetes and cholesterol management       History of Present Illness   HPI   Still struggling with chronic thoracic and lumbar DDD, in PT. Pain mgmt started lyrica recently.   Tried cymbalta from rheum but actually feels worse than on lexapro (anxiety and chronic pain)    HLD - vascepa causing nausea  DM - controlling diet      Review of Systems    /60 (BP Location: Right arm, Patient Position: Sitting, Cuff Size: Adult)   Pulse 88   Temp 98 °F (36.7 °C) (Temporal)   Ht 172.7 cm (67.99\")   Wt 104 kg (229 lb)   SpO2 98%   BMI 34.83 kg/m²          Physical Exam  Vitals and nursing note reviewed.   Constitutional:       Appearance: He is well-developed.   HENT:      Head: Normocephalic and atraumatic.   Eyes:      Conjunctiva/sclera: Conjunctivae normal.      Pupils: Pupils are equal, round, and reactive to light.   Cardiovascular:      Rate and Rhythm: Normal rate and regular rhythm.      Heart sounds: Normal heart sounds.   Pulmonary:      Effort: Pulmonary effort is normal.      Breath sounds: Normal breath sounds.   Musculoskeletal:      Cervical back: Normal range of motion.   Skin:     General: Skin is warm and dry.   Neurological:      Mental Status: He is alert and oriented to person, place, and time.   Psychiatric:         Judgment: Judgment normal.           Current Outpatient Medications:   •  acetaminophen (TYLENOL) 650 MG 8 hr tablet, Take 2 tablets by mouth Every 8 (Eight) Hours., Disp: , Rfl:   •  Cholecalciferol (Vitamin D3) 1.25 MG (72830 UT) capsule, TAKE ONE CAPSULE BY MOUTH ONCE WEEKLY, Disp: 12 capsule, Rfl: 0  •  escitalopram (LEXAPRO) 20 MG tablet, TAKE ONE TABLET BY MOUTH DAILY, Disp: 90 tablet, Rfl: 3  •  icosapent ethyl (VASCEPA) 1 g capsule capsule, Take 2 g by mouth 2 (Two) Times a Day With Meals., Disp: 120 capsule, Rfl: 5  •  Multiple Vitamins-Minerals (ZINC " PO), Take 1 tablet by mouth Daily., Disp: , Rfl:   •  omeprazole (priLOSEC) 20 MG capsule, Take 1 capsule by mouth Daily., Disp: , Rfl:   •  predniSONE (DELTASONE) 5 MG tablet, , Disp: , Rfl:   •  pregabalin (LYRICA) 50 MG capsule, Take 1 capsule by mouth 2 (Two) Times a Day., Disp: 60 capsule, Rfl: 0  •  rosuvastatin (CRESTOR) 40 MG tablet, TAKE ONE TABLET BY MOUTH DAILY, Disp: 90 tablet, Rfl: 3  •  methotrexate 2.5 MG tablet, , Disp: , Rfl:      Diagnoses and all orders for this visit:    Mixed hyperlipidemia  -     Hemoglobin A1c  -     Comprehensive Metabolic Panel  -     Lipid Panel With / Chol / HDL Ratio    Anxiety    DDD (degenerative disc disease), lumbar    DDD (degenerative disc disease), thoracic    Type 2 diabetes mellitus without complication, without long-term current use of insulin  -     Hemoglobin A1c  -     Comprehensive Metabolic Panel  -     Lipid Panel With / Chol / HDL Ratio    Other orders  -     methotrexate 2.5 MG tablet       Change back to lexapro from cymbalta. If not improving enough consider adding wellbutrin instead, will update me in 3-4 weeks on that.  Recheck labs consider benefit of vascepa vs SE  Continue diet control and exercise as tolerated      EMR Dragon/Transcription disclaimer:    Much of this encounter note is an electronic transcription/translation of spoken language to printed text.  The electronic translation of spoken language may permit erroneous, or at times, nonsensical words or phrases to be inadvertently transcribed.  Although I have reviewed the note for such errors some may still exist.

## 2023-05-03 ENCOUNTER — TREATMENT (OUTPATIENT)
Dept: PHYSICAL THERAPY | Facility: CLINIC | Age: 63
End: 2023-05-03
Payer: COMMERCIAL

## 2023-05-03 ENCOUNTER — TRANSCRIBE ORDERS (OUTPATIENT)
Dept: SURGERY | Facility: SURGERY CENTER | Age: 63
End: 2023-05-03
Payer: COMMERCIAL

## 2023-05-03 DIAGNOSIS — M41.9 SCOLIOSIS OF THORACOLUMBAR SPINE, UNSPECIFIED SCOLIOSIS TYPE: ICD-10-CM

## 2023-05-03 DIAGNOSIS — G89.29 CHRONIC BILATERAL THORACIC BACK PAIN: ICD-10-CM

## 2023-05-03 DIAGNOSIS — G89.29 CHRONIC BILATERAL LOW BACK PAIN WITHOUT SCIATICA: Primary | ICD-10-CM

## 2023-05-03 DIAGNOSIS — M54.6 CHRONIC BILATERAL THORACIC BACK PAIN: ICD-10-CM

## 2023-05-03 DIAGNOSIS — M54.50 CHRONIC BILATERAL LOW BACK PAIN WITHOUT SCIATICA: Primary | ICD-10-CM

## 2023-05-03 DIAGNOSIS — Z41.9 SURGERY, ELECTIVE: Primary | ICD-10-CM

## 2023-05-03 PROCEDURE — 97112 NEUROMUSCULAR REEDUCATION: CPT | Performed by: PHYSICAL THERAPIST

## 2023-05-03 PROCEDURE — 97140 MANUAL THERAPY 1/> REGIONS: CPT | Performed by: PHYSICAL THERAPIST

## 2023-05-03 PROCEDURE — 97110 THERAPEUTIC EXERCISES: CPT | Performed by: PHYSICAL THERAPIST

## 2023-05-03 NOTE — PROGRESS NOTES
Physical Therapy Daily Treatment Note    Patient: Familia Duke   : 1960  Diagnosis/ICD-10 Code:  Chronic bilateral low back pain without sciatica [M54.50, G89.29]  Referring practitioner: Khurram Kunz MD  Date of Initial Visit: Type: THERAPY  Noted: 3/1/2023  Today's Date: 5/3/2023  Patient seen for 13 sessions    [facility] [place] [address]  [phone] (phone)  [phone] (fax)         Subjective I felt a lot better after the last session    Objective     Exercises/Therapeutic activity/NM re-ed:  -scap ret and shoulder ext, blue band x20  -lat pull downs, blue band, x20  -supine shoulder horiz abd and D2 flexion, blue band x20 each  -LTR x15 to each side  -R SL open book, 10x 5 sec  -HS 90/90 stretch and piriformis stretch 3x20 sec    V/t cues for scapular setting with exercises     Manual:  Pt in R SL. STM to L thoracic PVMs, posterior RC, UT, and levator muscles with hands and smooth end of the  hand tool, also used knob end for trigger point release of the L UT, levator, and mid thoracic PVMs, light PA springing on the midthoracic ribs       Assessment/Plan  Watson did get some pain relief from the last treatment session. He has decreased tightness of the thoracis PVMs and fewer areas of trigger points. He is still getting sharp pains turning in bed at times in the midback, but the pain is more brief in duration.          Timed:    Manual Therapy:    15     mins  53227;  Therapeutic Exercise:    20     mins  21392;     Neuromuscular Nichole:    8    mins  75309;    Therapeutic Activity:          mins  71160;     Gait Training:           mins  28361;     Ultrasound:          mins  61706;    Electrical Stimulation:         mins  11378 ( );  Iontophoresis         mins 03363;  Aquatic Therapy         mins 45604;      Untimed:  Electrical Stimulation:         mins  91312 ( );  Traction:         mins  13652;   Dry  Needling   (1-2 muscles)             mins 20560 (Self-pay)  Dry Needling (3-4 muscles)           mins 20561 (Self-pay)  Dry Needling Trial              mins DRYNDLTRIAL  (No Charge)    Timed Treatment:   43   mins   Total Treatment:     43   mins    Charlotte Bailon PT, CDNT  Physical Therapist    [place] License:[id number]

## 2023-05-05 DIAGNOSIS — E11.9 TYPE 2 DIABETES MELLITUS WITHOUT COMPLICATION, WITHOUT LONG-TERM CURRENT USE OF INSULIN: Primary | ICD-10-CM

## 2023-05-05 RX ORDER — SEMAGLUTIDE 1.34 MG/ML
0.5 INJECTION, SOLUTION SUBCUTANEOUS WEEKLY
Qty: 1.5 ML | Refills: 1 | Status: SHIPPED | OUTPATIENT
Start: 2023-05-05

## 2023-05-09 ENCOUNTER — TELEPHONE (OUTPATIENT)
Dept: SPORTS MEDICINE | Facility: CLINIC | Age: 63
End: 2023-05-09
Payer: COMMERCIAL

## 2023-05-09 NOTE — TELEPHONE ENCOUNTER
I have submitted PA for Ozempic. Will wait for determination for patient. He is aware I will call him once prescription has been approved or denied. Thank you!      -KODAK Case

## 2023-05-10 NOTE — TELEPHONE ENCOUNTER
PA approved for Ozempic coverage starts on 05/09/2023 through 05/08/2024. Patient notified and will call pharmacy to see when he is able to pick this up. Thank you!      -KODAK Case

## 2023-05-11 ENCOUNTER — TREATMENT (OUTPATIENT)
Dept: PHYSICAL THERAPY | Facility: CLINIC | Age: 63
End: 2023-05-11
Payer: COMMERCIAL

## 2023-05-11 DIAGNOSIS — M54.6 CHRONIC BILATERAL THORACIC BACK PAIN: ICD-10-CM

## 2023-05-11 DIAGNOSIS — G89.29 CHRONIC BILATERAL THORACIC BACK PAIN: ICD-10-CM

## 2023-05-11 DIAGNOSIS — G89.29 CHRONIC BILATERAL LOW BACK PAIN WITHOUT SCIATICA: Primary | ICD-10-CM

## 2023-05-11 DIAGNOSIS — M41.9 SCOLIOSIS OF THORACOLUMBAR SPINE, UNSPECIFIED SCOLIOSIS TYPE: ICD-10-CM

## 2023-05-11 DIAGNOSIS — M54.50 CHRONIC BILATERAL LOW BACK PAIN WITHOUT SCIATICA: Primary | ICD-10-CM

## 2023-05-11 PROCEDURE — 97140 MANUAL THERAPY 1/> REGIONS: CPT | Performed by: PHYSICAL THERAPIST

## 2023-05-11 PROCEDURE — 97035 APP MDLTY 1+ULTRASOUND EA 15: CPT | Performed by: PHYSICAL THERAPIST

## 2023-05-11 PROCEDURE — 97110 THERAPEUTIC EXERCISES: CPT | Performed by: PHYSICAL THERAPIST

## 2023-05-11 NOTE — PROGRESS NOTES
30-Day / 10-Visit Progress Note         Patient: Familia Duke   : 1960  Diagnosis/ICD-10 Code:  Chronic bilateral low back pain without sciatica [M54.50, G89.29]  Referring practitioner: Khurram Kunz MD  Date of Initial Visit: Type: THERAPY  Noted: 3/1/2023  Today's Date: 2023  Patient seen for 14 sessions    [facility] [place] [address]  [phone] (phone)  [phone] (fax)    Subjective:     Clinical Progress: unchanged  Home Program Compliance: Yes  Treatment has included:  therapeutic exercise, manual therapy, therapeutic activity, neuro-muscular retraining , aquatic therapy, ultrasound and patient education with home exercise program     Subjective having some better days, still having pain that stops me, stabbing pain    Objective     Exercises:  -angry cat, x10  -thread the needle 2x to each side  -lateral child's pose x20 sec  -standing lat/thoracic stretch x20 sec       Manual:  Pt in prone over a pillow. STM to L thoracic PVMs and periscapular muscles with medium pressure, ischemic pressure for trigger point release of the L mid thoracic erector spinae, rhomboid, mid trap, light to medium PA springing/inferior glides on the L midthoracic ribs    Modalities:  US to L mid thoracic PVMs, pt in prone, 100%, 1MHz, 1.6w/cm2    Functional Outcome Score:   Oswestry Back Index=74%    Assessment & Plan     Assessment    Assessment details: Familia Duke has been seen for 14 physical therapy sessions for back pain.  Treatment has included therapeutic exercise, manual therapy, therapeutic activity, neuro-muscular retraining , aquatic therapy, ultrasound and patient education with home exercise program . Progress to physical therapy goals is slow. Watson has been having some days where is pain level is manageable, but he is still having severe sharp pains in the L thoracic area that impede with his daily function and sleeping.  He has tightness of the L thoracic PVMs, rhomboids, mid trap, UT, and levator, and decreased mobility of the thoracic spine and ribs on the L.  He will benefit from continued skilled physical therapy to address remaining impairments and functional limitations.     Prognosis: good    Goals  Plan Goals: ST wks  1. Patient will be independent with education for symptom management, joint protection and strategies to minimize stress on affected tissues.  (PART MET)   2. Pt to improve pain complaints to no more than 7/10 with ADLs. (ONGOING) , rated pain 7/10 average with daily activities, but can be sharper that this at times.    LT wks  1. Pt to improve pain complaints to no more than 4/10 with ADLs.  (ONGOING)   2. Pt to improve trunk strength by 1/2 to 1 MMT grade.  (PART MET)   3. Pt to improve Oswestry Back index score from 52% to 32% for overall functional improvement.  (ONGOING) scores 74% today  4. Pt to be independent with HEP for ROM, flexibility and core strength.  (ONGOING)     Plan  Therapy options: will be seen for skilled therapy services  Frequency: 2x week  Duration in weeks: 8  Treatment plan discussed with: patient  Plan details: Pt set to have an ablation on  (if approved) of mid to lower thoracic facets.  Discussed trial of dry needling for the midthoracic trigger points           Recommendations: Continue as planned  Timeframe: 2 months  Prognosis to achieve goals: good    PT Signature: Charlotte Bailon PT, CDNT    License Number: [id number]    Electronically signed by Charlotte Bailon PT, 23, 8:18 AM EDT      Based upon review of the patient's progress and continued therapy plan, it is my medical opinion that Familia Duke should continue physical therapy treatment at [facility]  [address] [place] [address]  [phone].    Signature: __________________________________  Khurram Kunz MD    Timed:  Manual  Therapy:    20     mins  36327;  Therapeutic Exercise:    12     mins  99896;     Neuromuscular Nichole:        mins  49628;    Therapeutic Activity:          mins  34942;     Gait Training:           mins  14055;     Ultrasound:     10     mins  86500;    Iontophoresis         mins 03468;    Untimed:  Electrical Stimulation:         mins  54758 ( );  Traction:         mins  10394;   Dry Needling   (1-2 muscles)            mins 20560 (Self-pay)  Dry Needling (3-4 muscles)             mins 20561 (Self-pay)  Dry Needling Trial                 mins DRYNDLTRIAL  (No Charge)    Timed Treatment:   42   mins   Total Treatment:     42   mins

## 2023-05-16 ENCOUNTER — TREATMENT (OUTPATIENT)
Dept: PHYSICAL THERAPY | Facility: CLINIC | Age: 63
End: 2023-05-16
Payer: COMMERCIAL

## 2023-05-16 DIAGNOSIS — M41.9 SCOLIOSIS OF THORACOLUMBAR SPINE, UNSPECIFIED SCOLIOSIS TYPE: ICD-10-CM

## 2023-05-16 DIAGNOSIS — G89.29 CHRONIC BILATERAL THORACIC BACK PAIN: ICD-10-CM

## 2023-05-16 DIAGNOSIS — M54.6 CHRONIC BILATERAL THORACIC BACK PAIN: ICD-10-CM

## 2023-05-16 DIAGNOSIS — G89.29 CHRONIC BILATERAL LOW BACK PAIN WITHOUT SCIATICA: Primary | ICD-10-CM

## 2023-05-16 DIAGNOSIS — M54.50 CHRONIC BILATERAL LOW BACK PAIN WITHOUT SCIATICA: Primary | ICD-10-CM

## 2023-05-16 PROCEDURE — 97110 THERAPEUTIC EXERCISES: CPT | Performed by: PHYSICAL THERAPIST

## 2023-05-16 PROCEDURE — 97112 NEUROMUSCULAR REEDUCATION: CPT | Performed by: PHYSICAL THERAPIST

## 2023-05-16 PROCEDURE — 97140 MANUAL THERAPY 1/> REGIONS: CPT | Performed by: PHYSICAL THERAPIST

## 2023-05-16 NOTE — PROGRESS NOTES
Physical Therapy Daily Treatment Note    Patient: Familia Duke   : 1960  Diagnosis/ICD-10 Code:  Chronic bilateral low back pain without sciatica [M54.50, G89.29]  Referring practitioner: Khurram Kunz MD  Date of Initial Visit: Type: THERAPY  Noted: 3/1/2023  Today's Date: 2023  Patient seen for 15 sessions    [facility] [place] [address]  [phone] (phone)  [phone] (fax)         Subjective I am feeling a little better. I am usually sore for a little bit after therapy, but then I feel more mobile    Objective     NM-re-ed:  Balance testing (SLS less than 10 sec B)  Standing perturbations with eyes open and closed, feet together    Manual:  Pt in R SL. STM to L thoracic PVMs and periscapular muscles with medium pressure, ischemic pressure for trigger point release of the L mid thoracic erector spinae, rhomboid, mid trap, light to medium PA springing/inferior glides on the L midthoracic ribs (used both my hands and The  hand tool for tissue release)     Modalities:  US to L mid thoracic PVMs, pt in prone, 100%, 1MHz, 1.6w/cm2    Assessment/Plan  Pt was turning around quickly in the kitchen, lost his balance and fell to the floor over the weekend. He does not have good sensation in his feet which could be a big contributor to his balance. He is having decreased tightness of the midthoracic muscles, but continues with hypomobility of the mid to lower thoracic spine and ribs with springing.          Timed:    Manual Therapy:    22     mins  06556;  Therapeutic Exercise:         mins  32843;     Neuromuscular Nichole:    12    mins  22094;    Therapeutic Activity:          mins  89330;     Gait Training:           mins  73328;     Ultrasound:     10     mins  17470;    Electrical Stimulation:         mins  23938 ( );  Iontophoresis         mins 45720;  Aquatic Therapy         mins 22256;      Untimed:  Electrical  Stimulation:         mins  27957 ( );  Traction:         mins  50600;   Dry Needling   (1-2 muscles)             mins 20560 (Self-pay)  Dry Needling (3-4 muscles)           mins 20561 (Self-pay)  Dry Needling Trial              mins DRYNDLTRIAL  (No Charge)    Timed Treatment:  44    mins   Total Treatment:     44   mins    Charlotte Bailon PT, CDNT  Physical Therapist    [place] License:[id number]

## 2023-05-18 ENCOUNTER — TREATMENT (OUTPATIENT)
Dept: PHYSICAL THERAPY | Facility: CLINIC | Age: 63
End: 2023-05-18
Payer: COMMERCIAL

## 2023-05-18 DIAGNOSIS — M41.9 SCOLIOSIS OF THORACOLUMBAR SPINE, UNSPECIFIED SCOLIOSIS TYPE: ICD-10-CM

## 2023-05-18 DIAGNOSIS — M54.6 CHRONIC BILATERAL THORACIC BACK PAIN: ICD-10-CM

## 2023-05-18 DIAGNOSIS — G89.29 CHRONIC BILATERAL THORACIC BACK PAIN: ICD-10-CM

## 2023-05-18 DIAGNOSIS — G89.29 CHRONIC BILATERAL LOW BACK PAIN WITHOUT SCIATICA: Primary | ICD-10-CM

## 2023-05-18 DIAGNOSIS — M54.50 CHRONIC BILATERAL LOW BACK PAIN WITHOUT SCIATICA: Primary | ICD-10-CM

## 2023-05-18 PROCEDURE — 97140 MANUAL THERAPY 1/> REGIONS: CPT | Performed by: PHYSICAL THERAPIST

## 2023-05-18 PROCEDURE — 97035 APP MDLTY 1+ULTRASOUND EA 15: CPT | Performed by: PHYSICAL THERAPIST

## 2023-05-18 NOTE — PROGRESS NOTES
Physical Therapy Daily Treatment Note    Patient: Familia Duke   : 1960  Diagnosis/ICD-10 Code:  Chronic bilateral low back pain without sciatica [M54.50, G89.29]  Referring practitioner: Khurram Kunz MD  Date of Initial Visit: Type: THERAPY  Noted: 3/1/2023  Today's Date: 2023  Patient seen for 16 sessions    [facility] [place] [address]  [phone] (phone)  [phone] (fax)         Subjective  Pt was sitting in his recliner on Tuesday night and went to get up, had a sharp pain in the L mid/upper back. It has been painful for the last couple of days.     Objective   See Exercise, Manual, and Modality Logs for complete treatment.     Manual:  Pt in R SL and prone. STM to L thoracic PVMs and periscapular muscles with medium pressure, ischemic pressure for trigger point release of the L mid thoracic erector spinae, rhomboid, mid trap. In prone, light to medium PA springing/inferior glides on the L midthoracic ribs, inferior mob grade 4-5 with cavitation of L ribs 9-11     Modalities:  US to L mid thoracic PVMs, pt in prone, 100%, 1MHz, 1.6w/cm2    Assessment/Plan  Pt was getting up from his recliner a couple of nights ago and had a sharp pain in the middle of the L thoracic area. He has tightness, decreased mobility of the mid thoracic muscles and ribs with pain springing, pain over ribs 9 and 10. He did have reduced pain after manipulation of those ribs with audible cavitation.          Timed:    Manual Therapy:    30     mins  84824;  Therapeutic Exercise:         mins  52546;     Neuromuscular Nichole:        mins  92400;    Therapeutic Activity:          mins  48012;     Gait Training:           mins  62403;     Ultrasound:     10     mins  75618;    Electrical Stimulation:         mins  79950 ( );  Iontophoresis         mins 27990;  Aquatic Therapy         mins 39627;      Untimed:  Electrical Stimulation:         mins   29362 ( );  Traction:         mins  62960;   Dry Needling   (1-2 muscles)             mins 20560 (Self-pay)  Dry Needling (3-4 muscles)           mins 20561 (Self-pay)  Dry Needling Trial              mins DRYNDLTRIAL  (No Charge)    Timed Treatment:   40   mins   Total Treatment:     40   mins    Charlotte Bailon PT, CDNT  Physical Therapist    [place] License:[id number]

## 2023-05-23 ENCOUNTER — TREATMENT (OUTPATIENT)
Dept: PHYSICAL THERAPY | Facility: CLINIC | Age: 63
End: 2023-05-23
Payer: COMMERCIAL

## 2023-05-23 DIAGNOSIS — G89.29 CHRONIC BILATERAL LOW BACK PAIN WITHOUT SCIATICA: Primary | ICD-10-CM

## 2023-05-23 DIAGNOSIS — M54.50 CHRONIC BILATERAL LOW BACK PAIN WITHOUT SCIATICA: Primary | ICD-10-CM

## 2023-05-23 DIAGNOSIS — M54.6 CHRONIC BILATERAL THORACIC BACK PAIN: ICD-10-CM

## 2023-05-23 DIAGNOSIS — M41.9 SCOLIOSIS OF THORACOLUMBAR SPINE, UNSPECIFIED SCOLIOSIS TYPE: ICD-10-CM

## 2023-05-23 DIAGNOSIS — G89.29 CHRONIC BILATERAL THORACIC BACK PAIN: ICD-10-CM

## 2023-05-23 PROCEDURE — 97035 APP MDLTY 1+ULTRASOUND EA 15: CPT | Performed by: PHYSICAL THERAPIST

## 2023-05-23 PROCEDURE — 97140 MANUAL THERAPY 1/> REGIONS: CPT | Performed by: PHYSICAL THERAPIST

## 2023-05-23 PROCEDURE — 97110 THERAPEUTIC EXERCISES: CPT | Performed by: PHYSICAL THERAPIST

## 2023-05-23 NOTE — PROGRESS NOTES
Physical Therapy Daily Treatment Note    Patient: Familia Duke   : 1960  Diagnosis/ICD-10 Code:  Chronic bilateral low back pain without sciatica [M54.50, G89.29]  Referring practitioner: Khurram Kunz MD  Date of Initial Visit: Type: THERAPY  Noted: 3/1/2023  Today's Date: 2023  Patient seen for 17 sessions    [facility] [address] [place] [address]  [phone] (phone)  [phone] (fax)         Subjective still having a lot of pain, but the severe pain isn't as severe    Objective     Exercises:  -Angry cat x5  -rhomboid cross chest stretch, 3x20 sec  -seated trunk twist using SC, arms flexed to various heights    Manual:  Pt in R SL, pillow between knees. STM to L thoracic PVMs and periscapular muscles with medium pressure, ischemic pressure for trigger point release of the L mid thoracic erector spinae, rhomboid, mid trap. In prone, light to medium PA springing/inferior glides on the L midthoracic ribs, inferior mob grade 4-5     Modalities:  US to L mid thoracic PVMs, pt in R SL, 100%, 1MHz, 1.6w/cm2    Assessment/Plan  Watson is getting about 1-2 days of good relief from therapy sessions. He has to be very careful of his movements, even getting up from his favorite chair or moving in bed. The pain is always on the L side, along with his rib hump from scoliosis. Has an ablation scheduled in  that he is waiting on approval for from insurance. It did really help his pain with the last one.          Timed:    Manual Therapy:    20     mins  09439;  Therapeutic Exercise:    10     mins  06655;     Neuromuscular Nichole:        mins  92039;    Therapeutic Activity:          mins  70875;     Gait Training:           mins  26780;     Ultrasound:     10     mins  69315;    Electrical Stimulation:         mins  32148 ( );  Iontophoresis         mins 08258;  Aquatic Therapy         mins 49759;      Untimed:  Electrical Stimulation:          mins  06292 ( );  Traction:         mins  55357;   Dry Needling   (1-2 muscles)             mins 20560 (Self-pay)  Dry Needling (3-4 muscles)           mins 20561 (Self-pay)  Dry Needling Trial              mins DRYNDLTRIAL  (No Charge)    Timed Treatment:   40   mins   Total Treatment:     40   mins    Charlotte Bailon PT, CDNT  Physical Therapist    [place] License:[id number]

## 2023-05-24 ENCOUNTER — TELEPHONE (OUTPATIENT)
Dept: PAIN MEDICINE | Facility: CLINIC | Age: 63
End: 2023-05-24
Payer: COMMERCIAL

## 2023-06-02 ENCOUNTER — LAB (OUTPATIENT)
Dept: LAB | Facility: HOSPITAL | Age: 63
End: 2023-06-02
Payer: COMMERCIAL

## 2023-06-02 ENCOUNTER — TRANSCRIBE ORDERS (OUTPATIENT)
Dept: ADMINISTRATIVE | Facility: HOSPITAL | Age: 63
End: 2023-06-02

## 2023-06-02 ENCOUNTER — HOSPITAL ENCOUNTER (OUTPATIENT)
Dept: CARDIOLOGY | Facility: HOSPITAL | Age: 63
Discharge: HOME | End: 2023-06-02
Payer: COMMERCIAL

## 2023-06-02 DIAGNOSIS — Z01.818 PRE-OP TESTING: ICD-10-CM

## 2023-06-02 DIAGNOSIS — Z01.818 PRE-OP TESTING: Primary | ICD-10-CM

## 2023-06-02 LAB
ANION GAP SERPL CALCULATED.3IONS-SCNC: 9.2 MMOL/L (ref 5–15)
BUN SERPL-MCNC: 16 MG/DL (ref 8–23)
BUN/CREAT SERPL: 12.9 (ref 7–25)
CALCIUM SPEC-SCNC: 9.7 MG/DL (ref 8.6–10.5)
CHLORIDE SERPL-SCNC: 105 MMOL/L (ref 98–107)
CO2 SERPL-SCNC: 25.8 MMOL/L (ref 22–29)
CREAT SERPL-MCNC: 1.24 MG/DL (ref 0.76–1.27)
EGFRCR SERPLBLD CKD-EPI 2021: 64.9 ML/MIN/1.73
GLUCOSE SERPL-MCNC: 107 MG/DL (ref 65–99)
POTASSIUM SERPL-SCNC: 4.8 MMOL/L (ref 3.5–5.2)
QT INTERVAL: 408 MS
SODIUM SERPL-SCNC: 140 MMOL/L (ref 136–145)

## 2023-06-02 PROCEDURE — 93005 ELECTROCARDIOGRAM TRACING: CPT | Performed by: PLASTIC SURGERY

## 2023-06-02 PROCEDURE — 80048 BASIC METABOLIC PNL TOTAL CA: CPT

## 2023-06-02 PROCEDURE — 36415 COLL VENOUS BLD VENIPUNCTURE: CPT

## 2023-06-07 ENCOUNTER — TELEPHONE (OUTPATIENT)
Dept: SPORTS MEDICINE | Facility: CLINIC | Age: 63
End: 2023-06-07
Payer: COMMERCIAL

## 2023-06-07 DIAGNOSIS — E11.9 TYPE 2 DIABETES MELLITUS WITHOUT COMPLICATION, WITHOUT LONG-TERM CURRENT USE OF INSULIN: ICD-10-CM

## 2023-06-07 NOTE — TELEPHONE ENCOUNTER
Patient called and wanted to know if he was able to get a 3 months supply of the Ozempic, please advise, thank you!    -KODAK Case

## 2023-06-08 RX ORDER — SEMAGLUTIDE 1.34 MG/ML
1 INJECTION, SOLUTION SUBCUTANEOUS WEEKLY
Qty: 3 ML | Refills: 1 | Status: SHIPPED | OUTPATIENT
Start: 2023-06-08

## 2023-06-08 NOTE — TELEPHONE ENCOUNTER
I called and spoke with the patient in regards to the message per , he verbalized understanding and will go ahead and  prescriptions and finish out his 1 month of the last dose of his ozempic. No further action needed at this time. Thank you!    -KODAK Case

## 2023-06-08 NOTE — TELEPHONE ENCOUNTER
I called the patient and relayed the message below, patient wanted to know if you would send in an increase dose or if he should finish this month's dose and get an increase next month. Please advise, thank you!      -KODAK Case

## 2023-08-07 ENCOUNTER — TELEPHONE (OUTPATIENT)
Dept: SPORTS MEDICINE | Facility: CLINIC | Age: 63
End: 2023-08-07

## 2023-08-07 DIAGNOSIS — E11.9 TYPE 2 DIABETES MELLITUS WITHOUT COMPLICATION, WITHOUT LONG-TERM CURRENT USE OF INSULIN: Primary | ICD-10-CM

## 2023-08-07 DIAGNOSIS — E78.2 MIXED HYPERLIPIDEMIA: ICD-10-CM

## 2023-08-07 NOTE — TELEPHONE ENCOUNTER
Called patient back, says typically has labs done with his follow up. Spoke with medical assistant, says will enter lab orders. Advised patient orders to be entered, will need to arrive fasting for labs, patient intends to have done tomorrow.

## 2023-08-07 NOTE — TELEPHONE ENCOUNTER
"Caller: Familia Duke \"Watson\"    Relationship to patient: Self    Best call back number:     Patient is needing: THE PATIENT WOULD LIKE TO KNOW IF HE NEEDS TO HAVE LAB WORK DONE BEFORE HIS APPT ON 8/9/23    HUB UNABLE TO SCHEDULE            "

## 2023-08-08 ENCOUNTER — LAB (OUTPATIENT)
Dept: LAB | Facility: HOSPITAL | Age: 63
End: 2023-08-08
Payer: COMMERCIAL

## 2023-08-08 LAB — HBA1C MFR BLD: 5.6 % (ref 4.8–5.6)

## 2023-08-08 PROCEDURE — 83036 HEMOGLOBIN GLYCOSYLATED A1C: CPT | Performed by: FAMILY MEDICINE

## 2023-08-08 PROCEDURE — 36415 COLL VENOUS BLD VENIPUNCTURE: CPT | Performed by: FAMILY MEDICINE

## 2023-08-09 ENCOUNTER — OFFICE VISIT (OUTPATIENT)
Dept: SPORTS MEDICINE | Facility: CLINIC | Age: 63
End: 2023-08-09
Payer: COMMERCIAL

## 2023-08-09 VITALS
BODY MASS INDEX: 32.43 KG/M2 | HEIGHT: 68 IN | WEIGHT: 214 LBS | HEART RATE: 87 BPM | SYSTOLIC BLOOD PRESSURE: 122 MMHG | RESPIRATION RATE: 16 BRPM | DIASTOLIC BLOOD PRESSURE: 60 MMHG | OXYGEN SATURATION: 98 %

## 2023-08-09 DIAGNOSIS — E66.1 CLASS 1 DRUG-INDUCED OBESITY WITH SERIOUS COMORBIDITY AND BODY MASS INDEX (BMI) OF 32.0 TO 32.9 IN ADULT: ICD-10-CM

## 2023-08-09 DIAGNOSIS — F41.9 ANXIETY: ICD-10-CM

## 2023-08-09 DIAGNOSIS — E78.2 MIXED HYPERLIPIDEMIA: ICD-10-CM

## 2023-08-09 DIAGNOSIS — E11.9 TYPE 2 DIABETES MELLITUS WITHOUT COMPLICATION, WITHOUT LONG-TERM CURRENT USE OF INSULIN: Primary | ICD-10-CM

## 2023-08-09 RX ORDER — TIRZEPATIDE 5 MG/.5ML
5 INJECTION, SOLUTION SUBCUTANEOUS WEEKLY
Qty: 6 ML | Refills: 1 | Status: SHIPPED | OUTPATIENT
Start: 2023-08-09

## 2023-08-09 NOTE — PROGRESS NOTES
"Familia is a 62 y.o. year old male    Chief Complaint   Patient presents with    Diabetes     F/u eval for DM - Ozempic rx is becoming  costly - would like to discuss options        History of Present Illness   HPI   Follow-up diabetes, hyperlipidemia, obesity, anxiety.  He is doing well since addition of Ozempic 3 months ago.  He has lost over 20 pounds and is A1c was reviewed and shows over 2 point drop.  He will need soft drinks from his diet.  His anxiety unfortunately continues and he feels like change Lexapro has been unsuccessful, feels like he does not get any benefit from it at this point.    Review of Systems    /60 (BP Location: Right arm, Patient Position: Sitting, Cuff Size: Adult)   Pulse 87   Resp 16   Ht 172.7 cm (67.99\")   Wt 97.1 kg (214 lb)   SpO2 98%   BMI 32.55 kg/mý          Physical Exam  Vitals reviewed.   Pulmonary:      Effort: Pulmonary effort is normal.   Neurological:      Mental Status: He is alert.   Psychiatric:         Mood and Affect: Mood normal.         Current Outpatient Medications:     acetaminophen (TYLENOL) 650 MG 8 hr tablet, Take 2 tablets by mouth Every 8 (Eight) Hours., Disp: , Rfl:     Cholecalciferol (Vitamin D3) 1.25 MG (87847 UT) capsule, TAKE ONE CAPSULE BY MOUTH ONCE WEEKLY, Disp: 12 capsule, Rfl: 0    escitalopram (LEXAPRO) 20 MG tablet, TAKE ONE TABLET BY MOUTH DAILY, Disp: 90 tablet, Rfl: 3    omeprazole (priLOSEC) 20 MG capsule, Take 1 capsule by mouth Daily., Disp: , Rfl:     predniSONE (DELTASONE) 5 MG tablet, Take 1 tablet by mouth. PRN, Disp: , Rfl:     rosuvastatin (CRESTOR) 40 MG tablet, TAKE ONE TABLET BY MOUTH DAILY, Disp: 90 tablet, Rfl: 3    Semaglutide, 1 MG/DOSE, (Ozempic, 1 MG/DOSE,) 4 MG/3ML solution pen-injector, Inject 1 mg under the skin into the appropriate area as directed 1 (One) Time Per Week., Disp: 3 mL, Rfl: 1    Multiple Vitamins-Minerals (ZINC PO), Take 1 tablet by mouth Daily. (Patient not taking: Reported on 8/9/2023), " Disp: , Rfl:     pregabalin (LYRICA) 50 MG capsule, Take 1 capsule by mouth 2 (Two) Times a Day. (Patient not taking: Reported on 8/9/2023), Disp: 60 capsule, Rfl: 0    Tirzepatide (Mounjaro) 5 MG/0.5ML solution pen-injector, Inject 0.5 mL under the skin into the appropriate area as directed 1 (One) Time Per Week., Disp: 6 mL, Rfl: 1     Diagnoses and all orders for this visit:    Type 2 diabetes mellitus without complication, without long-term current use of insulin  -     Tirzepatide (Mounjaro) 5 MG/0.5ML solution pen-injector; Inject 0.5 mL under the skin into the appropriate area as directed 1 (One) Time Per Week.  -     Hemoglobin A1c; Future  -     Lipid Panel With / Chol / HDL Ratio; Future  -     Comprehensive Metabolic Panel; Future    Mixed hyperlipidemia  -     Hemoglobin A1c; Future  -     Lipid Panel With / Chol / HDL Ratio; Future  -     Comprehensive Metabolic Panel; Future    Anxiety    Class 1 drug-induced obesity with serious comorbidity and body mass index (BMI) of 32.0 to 32.9 in adult       He has had tremendous success since his last visit.  His A1c is dramatically improved and his weight is down about 20 pounds.  We are going to try to change to Mounjaro due to the cost of Ozempic currently about $2000 for 3-month supply.  We discussed the debate of continuing this long-term given his current success, but I think would be preferable to get some longer-term stabilization before discontinue.  Discussed options for his anxiety, we could add Wellbutrin to his Lexapro, I think this would be preferable before changing to an alternative agent.  Follow-up in 3 to 4 months with labs.  We can also consider if he needs to continue statin or if he can try to wean down at that time as well.      EMR Dragon/Transcription disclaimer:    Much of this encounter note is an electronic transcription/translation of spoken language to printed text.  The electronic translation of spoken language may permit  erroneous, or at times, nonsensical words or phrases to be inadvertently transcribed.  Although I have reviewed the note for such errors some may still exist.

## 2023-08-22 ENCOUNTER — TELEPHONE (OUTPATIENT)
Dept: SPORTS MEDICINE | Facility: CLINIC | Age: 63
End: 2023-08-22
Payer: COMMERCIAL

## 2023-08-22 DIAGNOSIS — E11.9 TYPE 2 DIABETES MELLITUS WITHOUT COMPLICATION, WITHOUT LONG-TERM CURRENT USE OF INSULIN: ICD-10-CM

## 2023-08-22 RX ORDER — TIRZEPATIDE 5 MG/.5ML
5 INJECTION, SOLUTION SUBCUTANEOUS WEEKLY
Qty: 6 ML | Refills: 1 | Status: SHIPPED | OUTPATIENT
Start: 2023-08-22

## 2023-08-22 NOTE — TELEPHONE ENCOUNTER
Patient called because his pharmacy messed up on filling one of his prescriptions for  Tirzepatide (Mounjaro) 5 MG/0.5ML solution pen-injector [[id number]]       Please resend to:    [facility] [id number] - [place], [place] - [address] [place] [address] ROAD AT [address] - [phone]  - [phone] [address] [address]  Phone: [phone]  Fax: [phone]     Please advise  -Jenny

## 2023-08-30 RX ORDER — SEMAGLUTIDE 1.34 MG/ML
INJECTION, SOLUTION SUBCUTANEOUS
Qty: 3 ML | Refills: 1 | Status: SHIPPED | OUTPATIENT
Start: 2023-08-30

## 2023-11-02 ENCOUNTER — OFFICE VISIT (OUTPATIENT)
Dept: SPORTS MEDICINE | Facility: CLINIC | Age: 63
End: 2023-11-02
Payer: COMMERCIAL

## 2023-11-02 ENCOUNTER — LAB (OUTPATIENT)
Dept: LAB | Facility: HOSPITAL | Age: 63
End: 2023-11-02
Payer: COMMERCIAL

## 2023-11-02 VITALS
DIASTOLIC BLOOD PRESSURE: 70 MMHG | BODY MASS INDEX: 31.07 KG/M2 | TEMPERATURE: 98.2 F | RESPIRATION RATE: 16 BRPM | HEART RATE: 72 BPM | SYSTOLIC BLOOD PRESSURE: 112 MMHG | OXYGEN SATURATION: 99 % | HEIGHT: 68 IN | WEIGHT: 205 LBS

## 2023-11-02 DIAGNOSIS — G47.33 OBSTRUCTIVE SLEEP APNEA SYNDROME: Chronic | ICD-10-CM

## 2023-11-02 DIAGNOSIS — E78.2 MIXED HYPERLIPIDEMIA: ICD-10-CM

## 2023-11-02 DIAGNOSIS — E11.9 TYPE 2 DIABETES MELLITUS WITHOUT COMPLICATION, WITHOUT LONG-TERM CURRENT USE OF INSULIN: Primary | ICD-10-CM

## 2023-11-02 DIAGNOSIS — E11.9 TYPE 2 DIABETES MELLITUS WITHOUT COMPLICATION, WITHOUT LONG-TERM CURRENT USE OF INSULIN: ICD-10-CM

## 2023-11-02 DIAGNOSIS — M13.0 POLYARTHRITIS: ICD-10-CM

## 2023-11-02 LAB
ALBUMIN SERPL-MCNC: 4.5 G/DL (ref 3.5–5.2)
ALBUMIN UR-MCNC: 1.3 MG/DL
ALBUMIN/GLOB SERPL: 1.6 G/DL
ALP SERPL-CCNC: 86 U/L (ref 39–117)
ALT SERPL W P-5'-P-CCNC: 15 U/L (ref 1–41)
ANION GAP SERPL CALCULATED.3IONS-SCNC: 8.9 MMOL/L (ref 5–15)
AST SERPL-CCNC: 17 U/L (ref 1–40)
BILIRUB SERPL-MCNC: 0.5 MG/DL (ref 0–1.2)
BUN SERPL-MCNC: 13 MG/DL (ref 8–23)
BUN/CREAT SERPL: 11.1 (ref 7–25)
CALCIUM SPEC-SCNC: 9.7 MG/DL (ref 8.6–10.5)
CHLORIDE SERPL-SCNC: 104 MMOL/L (ref 98–107)
CHOLEST SERPL-MCNC: 106 MG/DL (ref 0–200)
CO2 SERPL-SCNC: 27.1 MMOL/L (ref 22–29)
CREAT SERPL-MCNC: 1.17 MG/DL (ref 0.76–1.27)
CREAT UR-MCNC: 115.3 MG/DL
EGFRCR SERPLBLD CKD-EPI 2021: 69.2 ML/MIN/1.73
GLOBULIN UR ELPH-MCNC: 2.9 GM/DL
GLUCOSE SERPL-MCNC: 107 MG/DL (ref 65–99)
HBA1C MFR BLD: 5.2 % (ref 4.8–5.6)
HDLC SERPL QL: 3.93
HDLC SERPL-MCNC: 27 MG/DL (ref 40–60)
LDLC SERPL CALC-MCNC: 51 MG/DL (ref 0–100)
MICROALBUMIN/CREAT UR: 11.3 MG/G (ref 0–29)
POTASSIUM SERPL-SCNC: 4.9 MMOL/L (ref 3.5–5.2)
PROT SERPL-MCNC: 7.4 G/DL (ref 6–8.5)
SODIUM SERPL-SCNC: 140 MMOL/L (ref 136–145)
TRIGL SERPL-MCNC: 161 MG/DL (ref 0–150)
VLDLC SERPL-MCNC: 28 MG/DL (ref 5–40)

## 2023-11-02 PROCEDURE — 80053 COMPREHEN METABOLIC PANEL: CPT

## 2023-11-02 PROCEDURE — 80061 LIPID PANEL: CPT

## 2023-11-02 PROCEDURE — 82043 UR ALBUMIN QUANTITATIVE: CPT | Performed by: FAMILY MEDICINE

## 2023-11-02 PROCEDURE — 36415 COLL VENOUS BLD VENIPUNCTURE: CPT

## 2023-11-02 PROCEDURE — 82570 ASSAY OF URINE CREATININE: CPT | Performed by: FAMILY MEDICINE

## 2023-11-02 PROCEDURE — 83036 HEMOGLOBIN GLYCOSYLATED A1C: CPT

## 2023-11-02 RX ORDER — SEMAGLUTIDE 1.34 MG/ML
1 INJECTION, SOLUTION SUBCUTANEOUS WEEKLY
Qty: 9 ML | Refills: 1 | Status: SHIPPED | OUTPATIENT
Start: 2023-11-02

## 2023-11-02 NOTE — PROGRESS NOTES
"Familia is a 63 y.o. year old male    Chief Complaint   Patient presents with    Diabetes       History of Present Illness   HPI   F/u DM, HLD, obesity. Feeling better with weight loss.   Started testo last week     Review of Systems    /70   Pulse 72   Temp 98.2 °F (36.8 °C)   Resp 16   Ht 172.7 cm (67.99\")   Wt 93 kg (205 lb)   SpO2 99%   BMI 31.18 kg/m²          Physical Exam  Vitals reviewed.   Pulmonary:      Effort: Pulmonary effort is normal.   Neurological:      Mental Status: He is alert.   Psychiatric:         Mood and Affect: Mood normal.           Current Outpatient Medications:     acetaminophen (TYLENOL) 650 MG 8 hr tablet, Take 2 tablets by mouth Every 8 (Eight) Hours., Disp: , Rfl:     Cholecalciferol (Vitamin D3) 1.25 MG (06198 UT) capsule, TAKE ONE CAPSULE BY MOUTH ONCE WEEKLY, Disp: 12 capsule, Rfl: 0    escitalopram (LEXAPRO) 20 MG tablet, TAKE ONE TABLET BY MOUTH DAILY, Disp: 90 tablet, Rfl: 3    Multiple Vitamins-Minerals (ZINC PO), Take 1 tablet by mouth Daily., Disp: , Rfl:     omeprazole (priLOSEC) 20 MG capsule, Take 1 capsule by mouth Daily., Disp: , Rfl:     predniSONE (DELTASONE) 5 MG tablet, Take 1 tablet by mouth. PRN, Disp: , Rfl:     pregabalin (LYRICA) 50 MG capsule, Take 1 capsule by mouth 2 (Two) Times a Day., Disp: 60 capsule, Rfl: 0    rosuvastatin (CRESTOR) 40 MG tablet, TAKE ONE TABLET BY MOUTH DAILY, Disp: 90 tablet, Rfl: 3    Semaglutide, 1 MG/DOSE, (Ozempic, 1 MG/DOSE,) 2 MG/1.5ML solution pen-injector, Inject 1 mg under the skin into the appropriate area as directed 1 (One) Time Per Week., Disp: 9 mL, Rfl: 1     Diagnoses and all orders for this visit:    Type 2 diabetes mellitus without complication, without long-term current use of insulin  -     Semaglutide, 1 MG/DOSE, (Ozempic, 1 MG/DOSE,) 2 MG/1.5ML solution pen-injector; Inject 1 mg under the skin into the appropriate area as directed 1 (One) Time Per Week.  -     Cancel: Hemoglobin A1c  -     Cancel: " Lipid Panel With / Chol / HDL Ratio  -     Cancel: Comprehensive Metabolic Panel  -     Microalbumin / Creatinine Urine Ratio - Urine, Clean Catch    Mixed hyperlipidemia  -     Cancel: Hemoglobin A1c  -     Cancel: Lipid Panel With / Chol / HDL Ratio  -     Cancel: Comprehensive Metabolic Panel  -     Microalbumin / Creatinine Urine Ratio - Urine, Clean Catch    Polyarthritis    Obstructive sleep apnea syndrome  -     Ambulatory Referral to Sleep Medicine    I am encouraged by his weight loss, which is having benefits in multiple regions including his diabetes, lipids, and polyarthritis.  Certainly the medication is helping but he continues to put forth good effort on lifestyle management.        EMR Dragon/Transcription disclaimer:    Much of this encounter note is an electronic transcription/translation of spoken language to printed text.  The electronic translation of spoken language may permit erroneous, or at times, nonsensical words or phrases to be inadvertently transcribed.  Although I have reviewed the note for such errors some may still exist.

## 2023-11-03 NOTE — PROGRESS NOTES
I have sent the patient a [employer] message with results from providers interpretation. A letter has also been printed and mailed out to the patient for their records. Informed to return call to office or message with any questions or concerns.     Thank you,  KODAK Plasencia/JENNIFER

## 2023-11-07 ENCOUNTER — OFFICE VISIT (OUTPATIENT)
Dept: SLEEP MEDICINE | Facility: HOSPITAL | Age: 63
End: 2023-11-07
Payer: COMMERCIAL

## 2023-11-07 VITALS
HEART RATE: 80 BPM | BODY MASS INDEX: 32.04 KG/M2 | DIASTOLIC BLOOD PRESSURE: 72 MMHG | SYSTOLIC BLOOD PRESSURE: 126 MMHG | WEIGHT: 211.4 LBS | OXYGEN SATURATION: 100 % | HEIGHT: 68 IN

## 2023-11-07 DIAGNOSIS — G47.33 OBSTRUCTIVE SLEEP APNEA SYNDROME: Primary | ICD-10-CM

## 2023-11-07 PROCEDURE — G0463 HOSPITAL OUTPT CLINIC VISIT: HCPCS

## 2023-11-07 NOTE — PROGRESS NOTES
[facility]      Patient Care Team:  Khurram Kunz MD as PCP - General (Sports Medicine)    Referring Provider: Khurram Kunz MD    Chief complaint:   Establish care for sleep apnea    History of present illness:  Subjective   This is a 63 year old male patient with hx of anxiety.    He reported a diagnosis of sleep apnea >20 year ago. He has been on CPAP therapy since then. He reported using his machine faithfully. His baseline symptoms consist of loud snoring, non refreshing and EDS. Most of his symptoms improved with CPAP use. However, he has noticed lately that he's swallowing air and bleaching a lot.     Sleep schedule:  -Bedtime: 11 PM  -Sleep latency: 5-10 min  -Wake up time: 7 AM , does not feel refreshed  -Nocturnal awakenin times because of nocturia.  No difficulties going back to sleep.  -Perceived sleep hours: 8    Mask: Resmed Air Fit F20, small- which does not fit well.  No significant air leak but sometimes dry mouth  DME: Online    ESS: Total score: 12     Review of Systems  Constitutional: No fever or chills. No changes in appetite.   ENMT: No sinus congestion, postnasal drip, hoarseness. Neck pain.   Cardiovascular: No chest pain, palpitation or legs swelling.    Respiratory: No dyspnea, cough or wheezing.  Gastrointestinal: No constipation, diarrhea, abdominal pain or acid reflux  Neurology: No headache, weakness, numbness or dizziness.   Musculoskeletal: No joints pain, stiffness or swelling.   Psychiatry: No depression, anxiety or irritability.   Hem/Lymphatic: No swollen glands or easy bruising.  Integumentary: No rash.  Endocrinology: No excessive thirst, cold or warm intolerance.   Urinary: No dysuria, bloody urine or frequent urination.     History  Past Medical History:   Diagnosis Date    Anesthesia complication     following block for shoulder surgery the numbing med affected lungs and he had to spend night for  observation till he could breath again    Anxiety     Arthritis     Benign prostatic hyperplasia with incomplete bladder emptying 12/13/2021    BPH (benign prostatic hyperplasia)     Cataract Three years ago    Cervical disc disorder 15 + years ago    Chronic back pain     Chronic pain disorder Back    GERD (gastroesophageal reflux disease)     Gross hematuria 12/13/2021    Headache, tension-type Back of head    Hemorrhoid     Hernia     History of compression fracture of spine     thoracic area    HL (hearing loss) 30 years ago    Hyperlipidemia     Injury of back     Injury of plantar plate of left foot 12/04/2014    Plantar plate rupture had surgery    Joint pain Hands    Low back pain     Migraine     Neck pain Neck    Osteoarthritis     Renal insufficiency 7/2019    Scoliosis 1983    Shoulder injury     Sleep apnea     CPAP    Spinal stenosis     Vitreous detachment    ,   Past Surgical History:   Procedure Laterality Date    BACK SURGERY N/A 1982    Dr. Walter, L4-L5    CARPAL TUNNEL RELEASE Left     CATARACT EXTRACTION, BILATERAL      COLONOSCOPY N/A     normal colonoscopy    COLONOSCOPY N/A 10/31/2018    Procedure: COLONOSCOPY  TO CECUM/TI;  Surgeon: Yomi Wiley MD;  Location: [facility] ENDOSCOPY;  Service: General    CYSTOSCOPY TRANSURETHRAL RESECTION OF PROSTATE N/A 12/13/2021    Procedure: TRANSURETHRAL RESECTION OF PROSTATE;  Surgeon: John Hodges MD;  Location: [facility] MAIN OR;  Service: Urology;  Laterality: N/A;    EPIDURAL BLOCK      EYE SURGERY      CATARACTS    FOOT OSTEOTOMY W/ PLANTAR FASCIA RELEASE Left 12/04/2014    PLANTAR PLATE REPAIR 2ND, 3RD, & 4TH DIGITS, EXTENSOR TENDON LENGTHING 2ND, 3RD & 4TH DIGITS, WEIL OSTEOTOMY 2ND, 3RD & 4TH DIGITS-Dr. Azael Schilling, DPM     FOOT SURGERY Left 2017    HAND SURGERY  3/2021    KNEE SURGERY Bilateral 1987, 1992    scope    LUMBAR LAMINECTOMY DISCECTOMY DECOMPRESSION Bilateral 09/06/2019    Procedure: LUMBAR DECOMPRESSION, open bilateral  lumbar decompression L3 to S1;  Surgeon: Brandon Velasquez MD;  Location: [facility] MAIN OR;  Service: Neurosurgery    MEDIAL BRANCH BLOCK Bilateral 03/24/2021    Procedure: Bilateral MEDIAL BRANCH BLOCK at approximately T9-T11;  Surgeon: Javon Springer MD;  Location: [facility] MAIN OR;  Service: Pain Management;  Laterality: Bilateral;    ORTHOPEDIC SURGERY  Back    PROSTATE SURGERY  12/13/2021    SHOULDER ARTHROSCOPY W/ ACROMIAL REPAIR Right 09/04/2015    Shoulder arthroscopy with SLAP and rotator cuff debridement, subacromial decompression with acromioplasty, DCE and mini open biceps tenodesis, one Arthrex 6.25 mm Biocomposite Swivelock Tenodesis Screw-Dr. Ten Bryant     SHOULDER ARTHROSCOPY W/ ACROMIAL REPAIR Left 01/20/2017    Shoulder arthroscopy with SLAP/labral/rotator cuff debridement, subacromial decompression with acromioplasty, DCE, chondroplasty and biceps tenodesis, one Arthrex 6.25 mm Biocomposite Swivelock Tenodesis Screw-Dr. Ten Bryant     SHOULDER DEBRIDEMENT Left 03/09/2017    Scar revision of a 3.5 centimeter incision with irrigation and debridement, which was an excisional debridement of subcutaneous tissue and a small area of the muscle-Dr. Ten Bryant     SHOULDER SURGERY  2019    SPINE SURGERY  1981, 2019    TRIGGER POINT INJECTION  Hands, back    UPPER GASTROINTESTINAL ENDOSCOPY N/A     VASECTOMY  2002    VITRECTOMY Left 12/20/2021    WRIST SURGERY  2020 and 2021   ,   Family History   Problem Relation Age of Onset    Pancreatic cancer Mother     Arthritis Mother     Cancer Mother     Cancer Father         Sarcoma    Hearing loss Father     Kidney disease Father     Scoliosis Father     Breast cancer Sister     Alcohol abuse Sister     Cancer Sister     Lymphoma Brother     Other Brother 53        accident    Cancer Brother     Hyperlipidemia Brother     Diabetes Maternal Grandmother     No Known Problems Maternal Grandfather     No Known Problems Paternal Grandmother     No Known Problems  Paternal Grandfather     Malig Hyperthermia Neg Hx    ,   Social History     Socioeconomic History    Marital status:     Number of children: 2    Years of education: college    Highest education level: Not asked   Tobacco Use    Smoking status: Never    Smokeless tobacco: Never   Vaping Use    Vaping Use: Never used   Substance and Sexual Activity    Alcohol use: No    Drug use: No    Sexual activity: Defer     E-cigarette/Vaping    E-cigarette/Vaping Use Never User      E-cigarette/Vaping Substances    Nicotine No     THC No     CBD No     Flavoring No      E-cigarette/Vaping Devices    Disposable No     Pre-filled or Refillable Cartridge No     Refillable Tank No     Pre-filled Pod No          , (Not in a hospital admission) , and Allergies:  Patient has no known allergies.    Medications:    Current Outpatient Medications:     acetaminophen (TYLENOL) 650 MG 8 hr tablet, Take 2 tablets by mouth Every 8 (Eight) Hours., Disp: , Rfl:     Cholecalciferol (Vitamin D3) 1.25 MG (99283 UT) capsule, TAKE ONE CAPSULE BY MOUTH ONCE WEEKLY, Disp: 12 capsule, Rfl: 0    escitalopram (LEXAPRO) 20 MG tablet, TAKE ONE TABLET BY MOUTH DAILY, Disp: 90 tablet, Rfl: 3    Multiple Vitamins-Minerals (ZINC PO), Take 1 tablet by mouth Daily., Disp: , Rfl:     omeprazole (priLOSEC) 20 MG capsule, Take 1 capsule by mouth Daily., Disp: , Rfl:     predniSONE (DELTASONE) 5 MG tablet, Take 1 tablet by mouth. PRN, Disp: , Rfl:     pregabalin (LYRICA) 50 MG capsule, Take 1 capsule by mouth 2 (Two) Times a Day., Disp: 60 capsule, Rfl: 0    rosuvastatin (CRESTOR) 40 MG tablet, TAKE ONE TABLET BY MOUTH DAILY, Disp: 90 tablet, Rfl: 3    Semaglutide, 1 MG/DOSE, (Ozempic, 1 MG/DOSE,) 2 MG/1.5ML solution pen-injector, Inject 1 mg under the skin into the appropriate area as directed 1 (One) Time Per Week., Disp: 9 mL, Rfl: 1      Objective   Vital Signs:  Vitals:    11/07/23 0849   BP: 126/72   Pulse: 80   SpO2: 100%   Weight: 95.9 kg (211 lb 6.4  "oz)   Height: 172.7 cm (67.99\")     Body mass index is 32.15 kg/m².  Neck Circumference: 17 inches     Physical Exam:  Neck Circumference: 17 inches     Constitutional: Not in acute distress.  Eyes: Injected conjunctiva, EOMI. pupils equal reactive to light.  ENMT: Guevara score 3. Mallampati score 1. No oral thrush. Tonsils grade 1. Narrow distance in between the posterior pharyngeal pillars (<50 %). Scalloped tongue.  Neck: Large. No thyromegaly.  Trachea midline.  Heart: Regular rhythm and rate, no murmur  Lungs: Good and equal air entry bilaterally. No crackles or wheezing.  Nonlabored breathing.       Abdomen: Obese.  Soft.  No tenderness.  Positive bowel sounds.  Extremities: No cyanosis, clubbing or pitting edema.  Warm extremities and well-perfused.  Neuro: Conscious, alert, oriented x3.  Gait is normal.  Strength 5/5 in arms and legs.  Psych: Appropriate mood and affect.    Integumentary: No rash.  Normal skin turgor.  Lymphatic: No palpable cervical or supraclavicular lymph nodes.    Diagnostic data:  Lab Results   Component Value Date    HGBA1C 5.20 11/02/2023     Total Cholesterol   Date Value Ref Range Status   11/02/2023 106 0 - 200 mg/dL Final     Triglycerides   Date Value Ref Range Status   11/02/2023 161 (H) 0 - 150 mg/dL Final     HDL Cholesterol   Date Value Ref Range Status   11/02/2023 27 (L) 40 - 60 mg/dL Final     Hemoglobin   Date Value Ref Range Status   01/16/2023 14.6 13.0 - 17.7 g/dL Final     CO2   Date Value Ref Range Status   11/02/2023 27.1 22.0 - 29.0 mmol/L Final        Assessment   JOSEFINA  Obesity, BMI 32  Hypersomnia  Anxiety      Plan:  Check HST to requalify for CPAP therapy. We discussed the pathophysiology of sleep apnea, testing and therapy which include CPAP and weight loss.  Patient is agreeable with CPAP therapy if needed.    Counseled for weight loss.  Encouraged to exercise regularly and cut down on carbohydrates.  Discussed that losing weight may decrease the severity of " sleep apnea and obviate the need of CPAP therapy.    Counseled against driving or operating of machinery when sleepy.  Obtain at least 7-8 hours of sleep per night.          Zane Desai MD  11/07/23  08:56 EST    This note was dictated utilizing Dragon dictation

## 2023-11-13 ENCOUNTER — HOSPITAL ENCOUNTER (OUTPATIENT)
Dept: SLEEP MEDICINE | Facility: HOSPITAL | Age: 63
End: 2023-11-13
Payer: COMMERCIAL

## 2023-11-13 DIAGNOSIS — G47.33 OBSTRUCTIVE SLEEP APNEA SYNDROME: ICD-10-CM

## 2023-11-13 PROCEDURE — 95806 SLEEP STUDY UNATT&RESP EFFT: CPT

## 2023-11-17 DIAGNOSIS — G47.33 OSA (OBSTRUCTIVE SLEEP APNEA): Primary | ICD-10-CM

## 2023-11-20 ENCOUNTER — TELEPHONE (OUTPATIENT)
Dept: SLEEP MEDICINE | Facility: HOSPITAL | Age: 63
End: 2023-11-20
Payer: COMMERCIAL

## 2024-01-04 ENCOUNTER — TELEPHONE (OUTPATIENT)
Dept: SPORTS MEDICINE | Facility: CLINIC | Age: 64
End: 2024-01-04
Payer: COMMERCIAL

## 2024-01-04 DIAGNOSIS — U07.1 COVID-19 VIRUS INFECTION: Primary | ICD-10-CM

## 2024-01-04 NOTE — TELEPHONE ENCOUNTER
Patient has tested positive for COVID. Symptoms started Tuesday, tested positive today. Wants to know if Dr Kunz can call anything in for him or any suggestions for relief. Please advise.

## 2024-01-05 RX ORDER — CODEINE PHOSPHATE/GUAIFENESIN 10-100MG/5
LIQUID (ML) ORAL
Qty: 180 ML | Refills: 0 | Status: SHIPPED | OUTPATIENT
Start: 2024-01-05

## 2024-01-05 RX ORDER — METHYLPREDNISOLONE 4 MG/1
TABLET ORAL
Qty: 21 TABLET | Refills: 0 | Status: SHIPPED | OUTPATIENT
Start: 2024-01-05

## 2024-01-05 NOTE — TELEPHONE ENCOUNTER
Patient returned call, I relayed Dr. Kunz's message. Patient stated he would like all 3 medications sent in, patient verbalized understanding that he will need to hold off on his rosuvastatin while taking Paxlovid and resume 1 week after finishing it. Patient declined call back from Dr. Kunz.     Patient stated he is feeling sore throat, and chest congestion, Please advise confirmed pharmacy in chart is correct

## 2024-01-08 NOTE — TELEPHONE ENCOUNTER
Patient has been called and informed to hold Flomax and restart until 1 week after, patient verbalized understanding.

## 2024-01-08 NOTE — TELEPHONE ENCOUNTER
Fax received from [facility] with message:    Contraindicated with Flomax that he gets from Dr. Hodges.     Please advise,

## 2024-02-08 ENCOUNTER — TELEPHONE (OUTPATIENT)
Dept: SPORTS MEDICINE | Facility: CLINIC | Age: 64
End: 2024-02-08
Payer: COMMERCIAL

## 2024-02-08 DIAGNOSIS — H91.90 HEARING LOSS, UNSPECIFIED HEARING LOSS TYPE, UNSPECIFIED LATERALITY: Primary | ICD-10-CM

## 2024-02-23 DIAGNOSIS — F41.9 ANXIETY: ICD-10-CM

## 2024-02-23 DIAGNOSIS — E78.2 MIXED HYPERLIPIDEMIA: ICD-10-CM

## 2024-02-23 RX ORDER — ROSUVASTATIN CALCIUM 40 MG/1
TABLET, COATED ORAL
Qty: 90 TABLET | Refills: 3 | Status: SHIPPED | OUTPATIENT
Start: 2024-02-23

## 2024-02-23 RX ORDER — ESCITALOPRAM OXALATE 20 MG/1
TABLET ORAL
Qty: 90 TABLET | Refills: 3 | Status: SHIPPED | OUTPATIENT
Start: 2024-02-23

## 2024-05-02 ENCOUNTER — OFFICE VISIT (OUTPATIENT)
Dept: SPORTS MEDICINE | Facility: CLINIC | Age: 64
End: 2024-05-02
Payer: COMMERCIAL

## 2024-05-02 ENCOUNTER — LAB (OUTPATIENT)
Dept: LAB | Facility: HOSPITAL | Age: 64
End: 2024-05-02
Payer: COMMERCIAL

## 2024-05-02 VITALS
SYSTOLIC BLOOD PRESSURE: 110 MMHG | HEART RATE: 80 BPM | OXYGEN SATURATION: 99 % | HEIGHT: 68 IN | BODY MASS INDEX: 31.07 KG/M2 | TEMPERATURE: 97.1 F | WEIGHT: 205 LBS | DIASTOLIC BLOOD PRESSURE: 70 MMHG

## 2024-05-02 DIAGNOSIS — N18.2 TYPE 2 DIABETES MELLITUS WITH STAGE 2 CHRONIC KIDNEY DISEASE, WITHOUT LONG-TERM CURRENT USE OF INSULIN: Primary | ICD-10-CM

## 2024-05-02 DIAGNOSIS — R10.84 GENERALIZED ABDOMINAL PAIN: ICD-10-CM

## 2024-05-02 DIAGNOSIS — E78.2 MIXED HYPERLIPIDEMIA: ICD-10-CM

## 2024-05-02 DIAGNOSIS — E11.22 TYPE 2 DIABETES MELLITUS WITH STAGE 2 CHRONIC KIDNEY DISEASE, WITHOUT LONG-TERM CURRENT USE OF INSULIN: Primary | ICD-10-CM

## 2024-05-02 DIAGNOSIS — Z80.0 FAMILY HISTORY OF PANCREATIC CANCER: ICD-10-CM

## 2024-05-02 DIAGNOSIS — R41.3 MEMORY LOSS: ICD-10-CM

## 2024-05-02 LAB
ALBUMIN SERPL-MCNC: 4.3 G/DL (ref 3.5–5.2)
ALBUMIN UR-MCNC: 2 MG/DL
ALBUMIN/GLOB SERPL: 1.6 G/DL
ALP SERPL-CCNC: 81 U/L (ref 39–117)
ALT SERPL W P-5'-P-CCNC: 23 U/L (ref 1–41)
ANION GAP SERPL CALCULATED.3IONS-SCNC: 6 MMOL/L (ref 5–15)
AST SERPL-CCNC: 26 U/L (ref 1–40)
BASOPHILS # BLD AUTO: 0.03 10*3/MM3 (ref 0–0.2)
BASOPHILS NFR BLD AUTO: 0.5 % (ref 0–1.5)
BILIRUB SERPL-MCNC: 0.4 MG/DL (ref 0–1.2)
BUN SERPL-MCNC: 12 MG/DL (ref 8–23)
BUN/CREAT SERPL: 11 (ref 7–25)
CALCIUM SPEC-SCNC: 9.8 MG/DL (ref 8.6–10.5)
CHLORIDE SERPL-SCNC: 106 MMOL/L (ref 98–107)
CHOLEST SERPL-MCNC: 90 MG/DL (ref 0–200)
CO2 SERPL-SCNC: 29 MMOL/L (ref 22–29)
CREAT SERPL-MCNC: 1.09 MG/DL (ref 0.76–1.27)
CREAT UR-MCNC: 204.7 MG/DL
DEPRECATED RDW RBC AUTO: 42.8 FL (ref 37–54)
EGFRCR SERPLBLD CKD-EPI 2021: 75.3 ML/MIN/1.73
EOSINOPHIL # BLD AUTO: 0.12 10*3/MM3 (ref 0–0.4)
EOSINOPHIL NFR BLD AUTO: 1.9 % (ref 0.3–6.2)
ERYTHROCYTE [DISTWIDTH] IN BLOOD BY AUTOMATED COUNT: 12.7 % (ref 12.3–15.4)
GLOBULIN UR ELPH-MCNC: 2.7 GM/DL
GLUCOSE SERPL-MCNC: 85 MG/DL (ref 65–99)
HBA1C MFR BLD: 5.4 % (ref 4.8–5.6)
HCT VFR BLD AUTO: 42.1 % (ref 37.5–51)
HDLC SERPL QL: 3.6
HDLC SERPL-MCNC: 25 MG/DL (ref 40–60)
HGB BLD-MCNC: 14.4 G/DL (ref 13–17.7)
IMM GRANULOCYTES # BLD AUTO: 0.01 10*3/MM3 (ref 0–0.05)
IMM GRANULOCYTES NFR BLD AUTO: 0.2 % (ref 0–0.5)
IRON 24H UR-MRATE: 79 MCG/DL (ref 59–158)
IRON SATN MFR SERPL: 20 % (ref 20–50)
LDLC SERPL CALC-MCNC: 30 MG/DL (ref 0–100)
LYMPHOCYTES # BLD AUTO: 1.79 10*3/MM3 (ref 0.7–3.1)
LYMPHOCYTES NFR BLD AUTO: 27.8 % (ref 19.6–45.3)
MAGNESIUM SERPL-MCNC: 2.3 MG/DL (ref 1.6–2.4)
MCH RBC QN AUTO: 31.5 PG (ref 26.6–33)
MCHC RBC AUTO-ENTMCNC: 34.2 G/DL (ref 31.5–35.7)
MCV RBC AUTO: 92.1 FL (ref 79–97)
MICROALBUMIN/CREAT UR: 9.8 MG/G (ref 0–29)
MONOCYTES # BLD AUTO: 0.39 10*3/MM3 (ref 0.1–0.9)
MONOCYTES NFR BLD AUTO: 6.1 % (ref 5–12)
NEUTROPHILS NFR BLD AUTO: 4.1 10*3/MM3 (ref 1.7–7)
NEUTROPHILS NFR BLD AUTO: 63.5 % (ref 42.7–76)
NRBC BLD AUTO-RTO: 0 /100 WBC (ref 0–0.2)
PLATELET # BLD AUTO: 146 10*3/MM3 (ref 140–450)
PMV BLD AUTO: 9.6 FL (ref 6–12)
POTASSIUM SERPL-SCNC: 4.9 MMOL/L (ref 3.5–5.2)
PROT SERPL-MCNC: 7 G/DL (ref 6–8.5)
RBC # BLD AUTO: 4.57 10*6/MM3 (ref 4.14–5.8)
SODIUM SERPL-SCNC: 141 MMOL/L (ref 136–145)
TIBC SERPL-MCNC: 387 MCG/DL (ref 298–536)
TRANSFERRIN SERPL-MCNC: 260 MG/DL (ref 200–360)
TRIGL SERPL-MCNC: 222 MG/DL (ref 0–150)
VIT B12 BLD-MCNC: 285 PG/ML (ref 211–946)
VLDLC SERPL-MCNC: 35 MG/DL (ref 5–40)
WBC NRBC COR # BLD AUTO: 6.44 10*3/MM3 (ref 3.4–10.8)

## 2024-05-02 PROCEDURE — 82043 UR ALBUMIN QUANTITATIVE: CPT | Performed by: FAMILY MEDICINE

## 2024-05-02 PROCEDURE — 83036 HEMOGLOBIN GLYCOSYLATED A1C: CPT | Performed by: FAMILY MEDICINE

## 2024-05-02 PROCEDURE — 82570 ASSAY OF URINE CREATININE: CPT | Performed by: FAMILY MEDICINE

## 2024-05-02 PROCEDURE — 83540 ASSAY OF IRON: CPT | Performed by: FAMILY MEDICINE

## 2024-05-02 PROCEDURE — 36415 COLL VENOUS BLD VENIPUNCTURE: CPT | Performed by: FAMILY MEDICINE

## 2024-05-02 PROCEDURE — 99214 OFFICE O/P EST MOD 30 MIN: CPT | Performed by: FAMILY MEDICINE

## 2024-05-02 PROCEDURE — 80053 COMPREHEN METABOLIC PANEL: CPT | Performed by: FAMILY MEDICINE

## 2024-05-02 PROCEDURE — 80061 LIPID PANEL: CPT | Performed by: FAMILY MEDICINE

## 2024-05-02 PROCEDURE — 84466 ASSAY OF TRANSFERRIN: CPT | Performed by: FAMILY MEDICINE

## 2024-05-02 PROCEDURE — 83735 ASSAY OF MAGNESIUM: CPT | Performed by: FAMILY MEDICINE

## 2024-05-02 PROCEDURE — 85025 COMPLETE CBC W/AUTO DIFF WBC: CPT | Performed by: FAMILY MEDICINE

## 2024-05-02 PROCEDURE — 82607 VITAMIN B-12: CPT | Performed by: FAMILY MEDICINE

## 2024-05-02 RX ORDER — ONDANSETRON 4 MG/1
4 TABLET, FILM COATED ORAL
COMMUNITY
Start: 2024-02-23 | End: 2024-05-02 | Stop reason: SDUPTHER

## 2024-05-02 RX ORDER — ONDANSETRON 4 MG/1
4 TABLET, FILM COATED ORAL EVERY 8 HOURS PRN
Qty: 90 TABLET | Refills: 3 | Status: SHIPPED | OUTPATIENT
Start: 2024-05-02

## 2024-05-13 NOTE — PROGRESS NOTES
"Familia is a 63 y.o. year old male    Chief Complaint   Patient presents with    Diabetes     Patient is here to follow up on his diabetes.     Hyperlipidemia     Follow up on his cholesterol.        History of Present Illness   Diabetes    Hyperlipidemia       Follow-up multiple medical concerns, diabetes and hyperlipidemia.  Since his last visit he has been overall stable.  Continues current diet and is losing weight on purpose.  Tolerating semaglutide well.    He is also concerned about gradually worsening trouble with memory.  Feels like he is forgetting objects and having trouble staying on task.    Concerned about some generalized nonspecific abdominal pain.  Family history of pancreatic cancer and several family members    Review of Systems    /70 (BP Location: Right arm, Patient Position: Sitting, Cuff Size: Adult)   Pulse 80   Temp 97.1 °F (36.2 °C) (Temporal)   Ht 172.7 cm (67.99\")   Wt 93 kg (205 lb)   SpO2 99%   BMI 31.18 kg/m²          Physical Exam  Vitals reviewed.   Constitutional:       Appearance: Normal appearance.   Cardiovascular:      Rate and Rhythm: Normal rate.      Heart sounds: Normal heart sounds.   Pulmonary:      Breath sounds: Normal breath sounds.   Abdominal:      General: Abdomen is flat. There is no distension.      Palpations: Abdomen is soft. There is no mass.   Neurological:      Mental Status: He is alert.   Psychiatric:         Mood and Affect: Mood normal.         Behavior: Behavior normal.         Thought Content: Thought content normal.         Judgment: Judgment normal.           Current Outpatient Medications:     acetaminophen (TYLENOL) 650 MG 8 hr tablet, Take 2 tablets by mouth Every 8 (Eight) Hours., Disp: , Rfl:     Cholecalciferol (Vitamin D3) 1.25 MG (51215 UT) capsule, TAKE ONE CAPSULE BY MOUTH ONCE WEEKLY, Disp: 12 capsule, Rfl: 0    escitalopram (LEXAPRO) 20 MG tablet, TAKE ONE TABLET BY MOUTH DAILY, Disp: 90 tablet, Rfl: 3    omeprazole (priLOSEC) " 20 MG capsule, Take 1 capsule by mouth Daily., Disp: , Rfl:     ondansetron (ZOFRAN) 4 MG tablet, Take 1 tablet by mouth Every 8 (Eight) Hours As Needed for Nausea or Vomiting., Disp: 90 tablet, Rfl: 3    predniSONE (DELTASONE) 5 MG tablet, Take 1 tablet by mouth. PRN, Disp: , Rfl:     rosuvastatin (CRESTOR) 40 MG tablet, TAKE ONE TABLET BY MOUTH DAILY, Disp: 90 tablet, Rfl: 3    Semaglutide, 1 MG/DOSE, (Ozempic, 1 MG/DOSE,) 2 MG/1.5ML solution pen-injector, Inject 1 mg under the skin into the appropriate area as directed 1 (One) Time Per Week., Disp: 9 mL, Rfl: 1    Multiple Vitamins-Minerals (ZINC PO), Take 1 tablet by mouth Daily. (Patient not taking: Reported on 5/2/2024), Disp: , Rfl:     pregabalin (LYRICA) 50 MG capsule, Take 1 capsule by mouth 2 (Two) Times a Day., Disp: 60 capsule, Rfl: 0     Diagnoses and all orders for this visit:    Type 2 diabetes mellitus with stage 2 chronic kidney disease, without long-term current use of insulin  -     ondansetron (ZOFRAN) 4 MG tablet; Take 1 tablet by mouth Every 8 (Eight) Hours As Needed for Nausea or Vomiting.  -     Hemoglobin A1c  -     Microalbumin / Creatinine Urine Ratio - Urine, Clean Catch  -     CBC & Differential  -     Comprehensive Metabolic Panel  -     Lipid Panel With / Chol / HDL Ratio  -     Vitamin B12  -     Magnesium  -     Iron Profile    Mixed hyperlipidemia  -     ondansetron (ZOFRAN) 4 MG tablet; Take 1 tablet by mouth Every 8 (Eight) Hours As Needed for Nausea or Vomiting.  -     Hemoglobin A1c  -     Microalbumin / Creatinine Urine Ratio - Urine, Clean Catch  -     CBC & Differential  -     Comprehensive Metabolic Panel  -     Lipid Panel With / Chol / HDL Ratio  -     Vitamin B12  -     Magnesium  -     Iron Profile    Memory loss  -     ondansetron (ZOFRAN) 4 MG tablet; Take 1 tablet by mouth Every 8 (Eight) Hours As Needed for Nausea or Vomiting.  -     Hemoglobin A1c  -     Microalbumin / Creatinine Urine Ratio - Urine, Clean Catch  -      CBC & Differential  -     Comprehensive Metabolic Panel  -     Lipid Panel With / Chol / HDL Ratio  -     Vitamin B12  -     Magnesium  -     Iron Profile  -     NeuroPsych Testing; Future    Generalized abdominal pain  -     ondansetron (ZOFRAN) 4 MG tablet; Take 1 tablet by mouth Every 8 (Eight) Hours As Needed for Nausea or Vomiting.  -     Hemoglobin A1c  -     Microalbumin / Creatinine Urine Ratio - Urine, Clean Catch  -     CBC & Differential  -     Comprehensive Metabolic Panel  -     Lipid Panel With / Chol / HDL Ratio  -     Vitamin B12  -     Magnesium  -     Iron Profile  -     Cancel: CT abdomen pelvis w wo contrast; Future  -     CT Abdomen Pelvis With Contrast; Future    Family history of pancreatic cancer  -     Cancel: CT abdomen pelvis w wo contrast; Future  -     CT Abdomen Pelvis With Contrast; Future    Other orders  -     Discontinue: ondansetron (ZOFRAN) 4 MG tablet; Take 1 tablet by mouth.       Continue current medication plan for diabetes management.  Continue weight loss.  Continue current plan for lipids as well.  Regarding his memory, no obvious concerning signs today but I think baseline neuropsychiatric testing would be helpful.  Abdominal pain is nonspecific but based on his family history I think is appropriate to be more precautions with workup.      EMR Dragon/Transcription disclaimer:    Much of this encounter note is an electronic transcription/translation of spoken language to printed text.  The electronic translation of spoken language may permit erroneous, or at times, nonsensical words or phrases to be inadvertently transcribed.  Although I have reviewed the note for such errors some may still exist.

## 2024-05-15 ENCOUNTER — HOSPITAL ENCOUNTER (OUTPATIENT)
Dept: CT IMAGING | Facility: HOSPITAL | Age: 64
Discharge: HOME | End: 2024-05-15
Admitting: FAMILY MEDICINE
Payer: COMMERCIAL

## 2024-05-15 DIAGNOSIS — Z80.0 FAMILY HISTORY OF PANCREATIC CANCER: ICD-10-CM

## 2024-05-15 DIAGNOSIS — R10.84 GENERALIZED ABDOMINAL PAIN: ICD-10-CM

## 2024-05-15 PROCEDURE — 25510000001 IOPAMIDOL 61 % SOLUTION: Performed by: FAMILY MEDICINE

## 2024-05-15 PROCEDURE — 0 DIATRIZOATE MEGLUMINE & SODIUM PER 1 ML: Performed by: FAMILY MEDICINE

## 2024-05-15 PROCEDURE — 74177 CT ABD & PELVIS W/CONTRAST: CPT

## 2024-05-15 RX ADMIN — IOPAMIDOL 85 ML: 612 INJECTION, SOLUTION INTRAVENOUS at 15:06

## 2024-05-15 RX ADMIN — DIATRIZOATE MEGLUMINE AND DIATRIZOATE SODIUM 30 ML: 660; 100 LIQUID ORAL; RECTAL at 13:55

## 2024-05-17 ENCOUNTER — TELEPHONE (OUTPATIENT)
Dept: SPORTS MEDICINE | Facility: CLINIC | Age: 64
End: 2024-05-17
Payer: COMMERCIAL

## 2024-05-17 DIAGNOSIS — E11.9 TYPE 2 DIABETES MELLITUS WITHOUT COMPLICATION, WITHOUT LONG-TERM CURRENT USE OF INSULIN: ICD-10-CM

## 2024-05-17 RX ORDER — SEMAGLUTIDE 1.34 MG/ML
1 INJECTION, SOLUTION SUBCUTANEOUS WEEKLY
Qty: 9 ML | Refills: 1 | Status: SHIPPED | OUTPATIENT
Start: 2024-05-17

## 2024-05-17 NOTE — PROGRESS NOTES
I called the patient and relayed the results above per the physician's interpretation. Patient verbalized understanding and did not have any further requests/ questions. No further action needed at this time. Thank you!     -Danny CANDELARIO/REYNOLD

## 2024-05-17 NOTE — TELEPHONE ENCOUNTER
Spoke with Mr Leilani about his labs and he asked if you would refill his Ozempic for a 3 month supply to the [facility] in [place].  He stated that you all were waiting for these labs to come back before the Ozempic was refilled.  Please Advise

## 2024-09-05 ENCOUNTER — TELEPHONE (OUTPATIENT)
Dept: SPORTS MEDICINE | Facility: CLINIC | Age: 64
End: 2024-09-05
Payer: COMMERCIAL

## 2024-09-05 NOTE — TELEPHONE ENCOUNTER
Patient brought in paperwork from [employer] regarding denial for Ozempic and what is needed to try to get it approved. Scanned into media. Please advise.

## 2024-09-12 NOTE — TELEPHONE ENCOUNTER
Patient called to check status of medication appeal; relayed per M.A. working on appeal and should be getting that faxed in today.

## 2024-09-13 NOTE — TELEPHONE ENCOUNTER
Kaylie with [facility] appeal's department called stating that patient's diagnosis does not meet the urgent appeal criteria, there for they will apply the number appeal time to process request which can be up to 30 days.

## 2024-10-17 ENCOUNTER — TELEPHONE (OUTPATIENT)
Dept: SPORTS MEDICINE | Facility: CLINIC | Age: 64
End: 2024-10-17
Payer: COMMERCIAL

## 2024-10-17 DIAGNOSIS — E11.9 TYPE 2 DIABETES MELLITUS WITHOUT COMPLICATION, WITHOUT LONG-TERM CURRENT USE OF INSULIN: ICD-10-CM

## 2024-10-17 NOTE — TELEPHONE ENCOUNTER
Patient says his insurance has denied prescription for Semiglutide/Ozempic. Says is completely out now, is requesting an alternative to that prescription as is now completely out.      Please advise.

## 2024-10-24 NOTE — TELEPHONE ENCOUNTER
Patient called requesting updated on medication. M.A. not available for transfer. Patient requested return call with update.

## 2024-10-24 NOTE — TELEPHONE ENCOUNTER
Called plan and they advise to resubmit PA, I have resubmitted PA for Ozempic and it is pending clinical review.

## 2024-10-29 RX ORDER — SEMAGLUTIDE 1.34 MG/ML
1 INJECTION, SOLUTION SUBCUTANEOUS WEEKLY
Qty: 9 ML | Refills: 1 | Status: SHIPPED | OUTPATIENT
Start: 2024-10-29

## 2024-10-29 NOTE — TELEPHONE ENCOUNTER
Received confirmation from insurance that PA has been obtained from 09/29/24-10/29/25.     Left patient a VM informing him that PA was been obtained.

## 2024-11-12 ENCOUNTER — TELEPHONE (OUTPATIENT)
Dept: SPORTS MEDICINE | Facility: CLINIC | Age: 64
End: 2024-11-12
Payer: COMMERCIAL

## 2024-11-12 NOTE — TELEPHONE ENCOUNTER
Patient is requesting an appointment on Wednesday 11-13 for a possible ear infection. I see no availability. Is there a place that you would want to work him in?     Please advise.

## 2024-11-13 ENCOUNTER — OFFICE VISIT (OUTPATIENT)
Dept: SPORTS MEDICINE | Facility: CLINIC | Age: 64
End: 2024-11-13
Payer: COMMERCIAL

## 2024-11-13 VITALS
DIASTOLIC BLOOD PRESSURE: 76 MMHG | TEMPERATURE: 96.7 F | WEIGHT: 214 LBS | BODY MASS INDEX: 32.43 KG/M2 | OXYGEN SATURATION: 98 % | SYSTOLIC BLOOD PRESSURE: 116 MMHG | HEART RATE: 69 BPM | HEIGHT: 68 IN

## 2024-11-13 DIAGNOSIS — H66.92 OTITIS, LEFT: Primary | ICD-10-CM

## 2024-11-13 PROCEDURE — 99213 OFFICE O/P EST LOW 20 MIN: CPT | Performed by: FAMILY MEDICINE

## 2024-11-13 RX ORDER — OFLOXACIN 3 MG/ML
SOLUTION/ DROPS OPHTHALMIC
Qty: 10 ML | Refills: 1 | Status: SHIPPED | OUTPATIENT
Start: 2024-11-13

## 2024-11-13 RX ORDER — AMOXICILLIN 875 MG/1
875 TABLET, COATED ORAL 2 TIMES DAILY
Qty: 20 TABLET | Refills: 0 | Status: SHIPPED | OUTPATIENT
Start: 2024-11-13

## 2024-11-13 NOTE — PROGRESS NOTES
"Familia is a 64 y.o. year old male     Chief Complaint   Patient presents with    Earache     LEFT EAR- Patient is here for initial evaluation of left earache.        History of Present Illness  History of Present Illness  The patient is here today for left ear pain that has been present for several days, gradually worsening.    He utilizes hearing aids and reports that they have been intensifying.    I have reviewed the patient's medical, family, and social history in detail and updated the computerized patient record.    Review of Systems    /76 (BP Location: Right arm, Patient Position: Sitting, Cuff Size: Adult)   Pulse 69   Temp 96.7 °F (35.9 °C) (Temporal)   Ht 172.7 cm (67.99\")   Wt 97.1 kg (214 lb)   SpO2 98%   BMI 32.55 kg/m²      Physical Exam    Vital signs reviewed.   General: No acute distress.      Physical Exam  Left ear shows erythema and desquamation in the external ear canal. A fair amount of erythema and a ton appearance to the tympanic membrane are noted. Pain is present with examination. Tenderness to palpation without jessika lymphadenopathy in the submandibular region on the left side.    Results  Results      Procedures     Diagnoses and all orders for this visit:    Otitis, left  -     ofloxacin (Ocuflox) 0.3 % ophthalmic solution; Apply 10 drops to ear twice daily  -     amoxicillin (AMOXIL) 875 MG tablet; Take 1 tablet by mouth 2 (Two) Times a Day.      Assessment & Plan  1. Left ear pain.  At least external otitis, possibly some otitis media simultaneously. I am going to go ahead and double cover with amoxicillin and ofloxacin eardrops.    Patient or patient representative verbalized consent for the use of Ambient Listening during the visit with  Kuhrram Kunz MD for chart documentation. 11/13/2024  14:08 EST    *Dictated after leaving exam room.     Khurram Kunz MD   09:51 EST   11/13/24   "

## 2025-01-20 ENCOUNTER — LAB (OUTPATIENT)
Dept: LAB | Facility: HOSPITAL | Age: 65
End: 2025-01-20
Payer: COMMERCIAL

## 2025-01-20 ENCOUNTER — OFFICE VISIT (OUTPATIENT)
Dept: SPORTS MEDICINE | Facility: CLINIC | Age: 65
End: 2025-01-20
Payer: COMMERCIAL

## 2025-01-20 VITALS
SYSTOLIC BLOOD PRESSURE: 104 MMHG | WEIGHT: 219 LBS | HEART RATE: 73 BPM | DIASTOLIC BLOOD PRESSURE: 70 MMHG | TEMPERATURE: 97.6 F | HEIGHT: 68 IN | OXYGEN SATURATION: 98 % | BODY MASS INDEX: 33.19 KG/M2

## 2025-01-20 DIAGNOSIS — E78.2 MIXED HYPERLIPIDEMIA: ICD-10-CM

## 2025-01-20 DIAGNOSIS — E11.22 TYPE 2 DIABETES MELLITUS WITH STAGE 2 CHRONIC KIDNEY DISEASE, WITHOUT LONG-TERM CURRENT USE OF INSULIN: ICD-10-CM

## 2025-01-20 DIAGNOSIS — Z00.00 ANNUAL PHYSICAL EXAM: Primary | ICD-10-CM

## 2025-01-20 DIAGNOSIS — N18.2 TYPE 2 DIABETES MELLITUS WITH STAGE 2 CHRONIC KIDNEY DISEASE, WITHOUT LONG-TERM CURRENT USE OF INSULIN: ICD-10-CM

## 2025-01-20 DIAGNOSIS — F41.9 ANXIETY: ICD-10-CM

## 2025-01-20 DIAGNOSIS — N18.2 STAGE 2 CHRONIC KIDNEY DISEASE: Chronic | ICD-10-CM

## 2025-01-20 LAB
ALBUMIN SERPL-MCNC: 4.5 G/DL (ref 3.5–5.2)
ALBUMIN UR-MCNC: <1.2 MG/DL
ALBUMIN/GLOB SERPL: 1.5 G/DL
ALP SERPL-CCNC: 83 U/L (ref 39–117)
ALT SERPL W P-5'-P-CCNC: 17 U/L (ref 1–41)
ANION GAP SERPL CALCULATED.3IONS-SCNC: 6.8 MMOL/L (ref 5–15)
AST SERPL-CCNC: 21 U/L (ref 1–40)
BASOPHILS # BLD AUTO: 0.03 10*3/MM3 (ref 0–0.2)
BASOPHILS NFR BLD AUTO: 0.4 % (ref 0–1.5)
BILIRUB SERPL-MCNC: 0.6 MG/DL (ref 0–1.2)
BILIRUB UR QL STRIP: NEGATIVE
BUN SERPL-MCNC: 11 MG/DL (ref 8–23)
BUN/CREAT SERPL: 10.8 (ref 7–25)
CALCIUM SPEC-SCNC: 9.7 MG/DL (ref 8.6–10.5)
CHLORIDE SERPL-SCNC: 107 MMOL/L (ref 98–107)
CHOLEST SERPL-MCNC: 101 MG/DL (ref 0–200)
CLARITY UR: CLEAR
CO2 SERPL-SCNC: 28.2 MMOL/L (ref 22–29)
COLOR UR: YELLOW
CREAT SERPL-MCNC: 1.02 MG/DL (ref 0.76–1.27)
CREAT UR-MCNC: 137.2 MG/DL
DEPRECATED RDW RBC AUTO: 43.3 FL (ref 37–54)
EGFRCR SERPLBLD CKD-EPI 2021: 81.1 ML/MIN/1.73
EOSINOPHIL # BLD AUTO: 0.12 10*3/MM3 (ref 0–0.4)
EOSINOPHIL NFR BLD AUTO: 1.8 % (ref 0.3–6.2)
ERYTHROCYTE [DISTWIDTH] IN BLOOD BY AUTOMATED COUNT: 12.7 % (ref 12.3–15.4)
GLOBULIN UR ELPH-MCNC: 3.1 GM/DL
GLUCOSE SERPL-MCNC: 94 MG/DL (ref 65–99)
GLUCOSE UR STRIP-MCNC: NEGATIVE MG/DL
HBA1C MFR BLD: 5.4 % (ref 4.8–5.6)
HCT VFR BLD AUTO: 44.5 % (ref 37.5–51)
HDLC SERPL QL: 3.61
HDLC SERPL-MCNC: 28 MG/DL (ref 40–60)
HGB BLD-MCNC: 15 G/DL (ref 13–17.7)
HGB UR QL STRIP.AUTO: NEGATIVE
HOLD SPECIMEN: NORMAL
IMM GRANULOCYTES # BLD AUTO: 0.02 10*3/MM3 (ref 0–0.05)
IMM GRANULOCYTES NFR BLD AUTO: 0.3 % (ref 0–0.5)
KETONES UR QL STRIP: NEGATIVE
LDLC SERPL CALC-MCNC: 47 MG/DL (ref 0–100)
LEUKOCYTE ESTERASE UR QL STRIP.AUTO: NEGATIVE
LYMPHOCYTES # BLD AUTO: 1.41 10*3/MM3 (ref 0.7–3.1)
LYMPHOCYTES NFR BLD AUTO: 20.9 % (ref 19.6–45.3)
MCH RBC QN AUTO: 31.2 PG (ref 26.6–33)
MCHC RBC AUTO-ENTMCNC: 33.7 G/DL (ref 31.5–35.7)
MCV RBC AUTO: 92.5 FL (ref 79–97)
MICROALBUMIN/CREAT UR: NORMAL MG/G{CREAT}
MONOCYTES # BLD AUTO: 0.43 10*3/MM3 (ref 0.1–0.9)
MONOCYTES NFR BLD AUTO: 6.4 % (ref 5–12)
NEUTROPHILS NFR BLD AUTO: 4.73 10*3/MM3 (ref 1.7–7)
NEUTROPHILS NFR BLD AUTO: 70.2 % (ref 42.7–76)
NITRITE UR QL STRIP: NEGATIVE
NRBC BLD AUTO-RTO: 0 /100 WBC (ref 0–0.2)
PH UR STRIP.AUTO: 7 [PH] (ref 5–8)
PLATELET # BLD AUTO: 155 10*3/MM3 (ref 140–450)
PMV BLD AUTO: 9.9 FL (ref 6–12)
POTASSIUM SERPL-SCNC: 4.9 MMOL/L (ref 3.5–5.2)
PROT SERPL-MCNC: 7.6 G/DL (ref 6–8.5)
PROT UR QL STRIP: NEGATIVE
RBC # BLD AUTO: 4.81 10*6/MM3 (ref 4.14–5.8)
SODIUM SERPL-SCNC: 142 MMOL/L (ref 136–145)
SP GR UR STRIP: 1.02 (ref 1–1.03)
TRIGL SERPL-MCNC: 150 MG/DL (ref 0–150)
UROBILINOGEN UR QL STRIP: ABNORMAL
VLDLC SERPL-MCNC: 26 MG/DL (ref 5–40)
WBC NRBC COR # BLD AUTO: 6.74 10*3/MM3 (ref 3.4–10.8)

## 2025-01-20 PROCEDURE — 80053 COMPREHEN METABOLIC PANEL: CPT | Performed by: FAMILY MEDICINE

## 2025-01-20 PROCEDURE — 82570 ASSAY OF URINE CREATININE: CPT | Performed by: FAMILY MEDICINE

## 2025-01-20 PROCEDURE — 90472 IMMUNIZATION ADMIN EACH ADD: CPT | Performed by: FAMILY MEDICINE

## 2025-01-20 PROCEDURE — 90471 IMMUNIZATION ADMIN: CPT | Performed by: FAMILY MEDICINE

## 2025-01-20 PROCEDURE — 36415 COLL VENOUS BLD VENIPUNCTURE: CPT | Performed by: FAMILY MEDICINE

## 2025-01-20 PROCEDURE — 82043 UR ALBUMIN QUANTITATIVE: CPT | Performed by: FAMILY MEDICINE

## 2025-01-20 PROCEDURE — 90715 TDAP VACCINE 7 YRS/> IM: CPT | Performed by: FAMILY MEDICINE

## 2025-01-20 PROCEDURE — 81003 URINALYSIS AUTO W/O SCOPE: CPT | Performed by: FAMILY MEDICINE

## 2025-01-20 PROCEDURE — 99397 PER PM REEVAL EST PAT 65+ YR: CPT | Performed by: FAMILY MEDICINE

## 2025-01-20 PROCEDURE — 80061 LIPID PANEL: CPT | Performed by: FAMILY MEDICINE

## 2025-01-20 PROCEDURE — 85025 COMPLETE CBC W/AUTO DIFF WBC: CPT | Performed by: FAMILY MEDICINE

## 2025-01-20 PROCEDURE — 84443 ASSAY THYROID STIM HORMONE: CPT | Performed by: FAMILY MEDICINE

## 2025-01-20 PROCEDURE — 83036 HEMOGLOBIN GLYCOSYLATED A1C: CPT | Performed by: FAMILY MEDICINE

## 2025-01-20 PROCEDURE — 90677 PCV20 VACCINE IM: CPT | Performed by: FAMILY MEDICINE

## 2025-01-20 RX ORDER — TESTOSTERONE 25 MG/2.5G
GEL TRANSDERMAL DAILY
COMMUNITY

## 2025-01-20 NOTE — PROGRESS NOTES
"Familia Duke is here today for an annual physical exam.         History of Present Illness  The patient is here for a routine annual physical. He feels well, maintains a healthy diet, and is physically active with walking and working in his wood shop.     He had a follow-up with urology and is not on any prostate medications, but another biopsy may be done. He continues topical testosterone, which is effective.    He reports no numbness or tingling in the feet but experiences intermittent pain in the great toe.    He has episodic nausea a couple of days a week, lasting a few minutes.    His mood is stable.        Little interest or pleasure in doing things? Not at all   Feeling down, depressed, or hopeless? Not at all   PHQ-2 Total Score 0          Health Maintenance   Topic Date Due    Pneumococcal Vaccine 65+ (1 of 2 - PCV) Never done    DIABETIC FOOT EXAM  Never done    DIABETIC EYE EXAM  Never done    TDAP/TD VACCINES (1 - Tdap) Never done    ZOSTER VACCINE (1 of 2) Never done    PT PLAN OF CARE  10/17/2019    INFLUENZA VACCINE  07/01/2024    COVID-19 Vaccine (1 - 2024-25 season) Never done    HEMOGLOBIN A1C  11/02/2024    LIPID PANEL  05/02/2025    URINE MICROALBUMIN  05/02/2025    BMI FOLLOWUP  10/29/2025    ANNUAL PHYSICAL  01/20/2026    COLORECTAL CANCER SCREENING  10/31/2028    HEPATITIS C SCREENING  Completed       Review of systems was performed, and pertinent findings are noted in the HPI.    /70 (BP Location: Right arm, Patient Position: Sitting, Cuff Size: Adult)   Pulse 73   Temp 97.6 °F (36.4 °C) (Temporal)   Ht 172.7 cm (67.99\")   Wt 99.3 kg (219 lb)   SpO2 98%   BMI 33.31 kg/m²               Physical Exam    Vital signs reviewed.  General appearance: No acute distress  Eyes: conjunctiva clear without erythema; pupils equally round and reactive  ENT: external ears normal; hearing normal  Neck: supple; no thyromegaly  CV: normal rate and rhythm; no peripheral edema  Respiratory: " normal respiratory effort; lungs clear to auscultation bilaterally  MSK: normal gait and station; no focal joint deformity or swelling  Skin: no rash or wounds; normal turgor  Neuro: cranial nerves 2-12 grossly intact; normal sensation to light touch  Psych: mood and affect normal; recent and remote memory intact      Current Outpatient Medications:     escitalopram (LEXAPRO) 20 MG tablet, TAKE ONE TABLET BY MOUTH DAILY, Disp: 90 tablet, Rfl: 3    omeprazole (priLOSEC) 20 MG capsule, Take 1 capsule by mouth Daily., Disp: , Rfl:     ondansetron (ZOFRAN) 4 MG tablet, Take 1 tablet by mouth Every 8 (Eight) Hours As Needed for Nausea or Vomiting., Disp: 90 tablet, Rfl: 3    predniSONE (DELTASONE) 5 MG tablet, Take 1 tablet by mouth. PRN, Disp: , Rfl:     rosuvastatin (CRESTOR) 40 MG tablet, TAKE ONE TABLET BY MOUTH DAILY, Disp: 90 tablet, Rfl: 3    Semaglutide, 1 MG/DOSE, (Ozempic, 1 MG/DOSE,) 2 MG/1.5ML solution pen-injector, Inject 1 mg under the skin into the appropriate area as directed 1 (One) Time Per Week., Disp: 9 mL, Rfl: 1    testosterone (ANDROGEL) 25 MG/2.5GM (1%) gel gel, Place  on the skin as directed by provider Daily., Disp: , Rfl:     Diagnoses and all orders for this visit:    1. Annual physical exam (Primary)  -     CBC & Differential  -     Comprehensive Metabolic Panel  -     Lipid Panel With / Chol / HDL Ratio  -     Thyroid Cascade Profile  -     Urinalysis With Culture If Indicated - Urine, Clean Catch  -     Hemoglobin A1c  -     Microalbumin / Creatinine Urine Ratio - Urine, Clean Catch  -     [place] Urine Culture Tube - Urine, Clean Catch    2. Stage 2 chronic kidney disease  -     Comprehensive Metabolic Panel  -     Microalbumin / Creatinine Urine Ratio - Urine, Clean Catch    3. Type 2 diabetes mellitus with stage 2 chronic kidney disease, without long-term current use of insulin  -     Hemoglobin A1c  -     Microalbumin / Creatinine Urine Ratio - Urine, Clean Catch    4. Mixed  hyperlipidemia  -     Lipid Panel With / Chol / HDL Ratio    5. Anxiety    Other orders  -     Tdap Vaccine Greater Than or Equal To 8yo IM  -     Pneumococcal Conjugate Vaccine 20-Valent All        Encourage healthy diet and exercise.  Encourage patient to stay up to date on screening examinations as indicated based on age and risk factors.    Assessment & Plan  1. Annual physical examination.  Discussed vaccines: plans for Prevnar, Tdap, and shingles vaccination. Declines influenza vaccine today. Laboratory tests conducted.    2. Episodic nausea.  Episodic nausea may be related to semaglutide. If labs show good control, reduce semaglutide dose from 1 mg to 0.5 mg weekly.    3. Polyarthritis.  Semaglutide has been beneficial for polyarthritis pain.    4. Diabetes mellitus.  Semaglutide provides excellent diabetes control. If labs show good control, reduce semaglutide dose from 1 mg to 0.5 mg weekly.    5. Prostate health.  Follow-up with urology, no current prostate medications. Another biopsy may be done. Continues effective topical testosterone.    Patient or patient representative verbalized consent for the use of Ambient Listening during the visit with  Khurram Kunz MD for chart documentation. 1/20/2025  10:53 EST  *Dictated after leaving exam room.   Khurram Kunz MD   12:55 EST   01/20/25

## 2025-01-20 NOTE — LETTER
[facility]  Vaccine Consent Form    Patient Name:  Familia Duke  Patient :  1960     Vaccine(s) Ordered    Tdap Vaccine Greater Than or Equal To 6yo IM  Pneumococcal Conjugate Vaccine 20-Valent All        Screening Checklist  The following questions should be completed prior to vaccination. If you answer “yes” to any question, it does not necessarily mean you should not be vaccinated. It just means we may need to clarify or ask more questions. If a question is unclear, please ask your healthcare provider to explain it.    Yes No   Any fever or moderate to severe illness today (mild illness and/or antibiotic treatment are not contraindications)?     Do you have a history of a serious reaction to any previous vaccinations, such as anaphylaxis, encephalopathy within 7 days, Guillain-[place] syndrome within 6 weeks, seizure?     Have you received any live vaccine(s) (e.g MMR, RIO) or any other vaccines in the last month (to ensure duplicate doses aren't given)?     Do you have an anaphylactic allergy to latex (DTaP, DTaP-IPV, Hep A, Hep B, MenB, RV, Td, Tdap), baker’s yeast (Hep B, HPV), polysorbates (RSV, nirsevimab, PCV 20, Rotavirrus, Tdap, Shingrix), or gelatin (RIO, MMR)?     Do you have an anaphylactic allergy to neomycin (Rabies, RIO, MMR, IPV, Hep A), polymyxin B (IPV), or streptomycin (IPV)?      Any cancer, leukemia, AIDS, or other immune system disorder? (RIO, MMR, RV)     Do you have a parent, brother, or sister with an immune system problem (if immune competence of vaccine recipient clinically verified, can proceed)? (MMR, RIO)     Any recent steroid treatments for >2 weeks, chemotherapy, or radiation treatment? (RIO, MMR)     Have you received antibody-containing blood transfusions or IVIG in the past 11 months (recommended interval is dependent on product)? (MMR, RIO)     Have you taken antiviral drugs (acyclovir, famciclovir, valacyclovir for RIO) in the last 24 or 48 hours, respectively?   "    Are you pregnant or planning to become pregnant within 1 month? (RIO, MMR, HPV, IPV, MenB, Abrexvy; For Hep B- refer to Engerix-B; For RSV - Abrysvo is indicated for 32-36 weeks of pregnancy from September to January)     For infants, have you ever been told your child has had intussusception or a medical emergency involving obstruction of the intestine (Rotavirus)? If not for an infant, can skip this question.         *Ordering Physicians/APC should be consulted if \"yes\" is checked by the patient or guardian above.  I have received, read, and understand the Vaccine Information Statement (VIS) for each vaccine ordered.  I have considered my or my child's health status as well as the health status of my close contacts.  I have taken the opportunity to discuss my vaccine questions with my or my child's health care provider.   I have requested that the ordered vaccine(s) be given to me or my child.  I understand the benefits and risks of the vaccines.  I understand that I should remain in the clinic for 15 minutes after receiving the vaccine(s).  _________________________________________________________  Signature of Patient or Parent/Legal Guardian ____________________  Date     "

## 2025-01-21 LAB — TSH SERPL DL<=0.005 MIU/L-ACNC: 2.89 UIU/ML (ref 0.45–4.5)

## 2025-01-23 DIAGNOSIS — E11.22 TYPE 2 DIABETES MELLITUS WITH STAGE 2 CHRONIC KIDNEY DISEASE, WITHOUT LONG-TERM CURRENT USE OF INSULIN: Primary | ICD-10-CM

## 2025-01-23 DIAGNOSIS — N18.2 TYPE 2 DIABETES MELLITUS WITH STAGE 2 CHRONIC KIDNEY DISEASE, WITHOUT LONG-TERM CURRENT USE OF INSULIN: Primary | ICD-10-CM

## 2025-01-29 DIAGNOSIS — N18.2 TYPE 2 DIABETES MELLITUS WITH STAGE 2 CHRONIC KIDNEY DISEASE, WITHOUT LONG-TERM CURRENT USE OF INSULIN: ICD-10-CM

## 2025-01-29 DIAGNOSIS — E11.22 TYPE 2 DIABETES MELLITUS WITH STAGE 2 CHRONIC KIDNEY DISEASE, WITHOUT LONG-TERM CURRENT USE OF INSULIN: ICD-10-CM

## 2025-01-29 NOTE — TELEPHONE ENCOUNTER
Received fax from [facility] stating that patient is requesting to fill a prescription thorugh [facility] home delivery.     Medication requested is Ozempic.

## 2025-02-17 DIAGNOSIS — E78.2 MIXED HYPERLIPIDEMIA: ICD-10-CM

## 2025-02-17 DIAGNOSIS — F41.9 ANXIETY: ICD-10-CM

## 2025-02-17 RX ORDER — ESCITALOPRAM OXALATE 20 MG/1
20 TABLET ORAL DAILY
Qty: 90 TABLET | Refills: 3 | Status: SHIPPED | OUTPATIENT
Start: 2025-02-17

## 2025-02-17 RX ORDER — ROSUVASTATIN CALCIUM 40 MG/1
40 TABLET, COATED ORAL DAILY
Qty: 90 TABLET | Refills: 3 | Status: SHIPPED | OUTPATIENT
Start: 2025-02-17

## 2025-04-07 DIAGNOSIS — E11.22 TYPE 2 DIABETES MELLITUS WITH STAGE 2 CHRONIC KIDNEY DISEASE, WITHOUT LONG-TERM CURRENT USE OF INSULIN: ICD-10-CM

## 2025-04-07 DIAGNOSIS — N18.2 TYPE 2 DIABETES MELLITUS WITH STAGE 2 CHRONIC KIDNEY DISEASE, WITHOUT LONG-TERM CURRENT USE OF INSULIN: ICD-10-CM

## 2025-04-08 DIAGNOSIS — F41.9 ANXIETY: ICD-10-CM

## 2025-04-08 DIAGNOSIS — E78.2 MIXED HYPERLIPIDEMIA: ICD-10-CM

## 2025-04-08 RX ORDER — ESCITALOPRAM OXALATE 20 MG/1
20 TABLET ORAL DAILY
Qty: 90 TABLET | Refills: 3 | Status: SHIPPED | OUTPATIENT
Start: 2025-04-08

## 2025-04-08 RX ORDER — ROSUVASTATIN CALCIUM 40 MG/1
40 TABLET, COATED ORAL DAILY
Qty: 90 TABLET | Refills: 3 | Status: SHIPPED | OUTPATIENT
Start: 2025-04-08

## 2025-04-16 ENCOUNTER — TELEPHONE (OUTPATIENT)
Dept: SPORTS MEDICINE | Facility: CLINIC | Age: 65
End: 2025-04-16
Payer: COMMERCIAL

## 2025-04-16 NOTE — TELEPHONE ENCOUNTER
"Received fax from [email or web address] stating     \" This patient has a history of ESCITALOPRAM TAB 20 MG, The recommended dose for elderly patients is 10 MG. We will process the prescription since it is a continuation of treatment and we do not want to delay or disrupt therapy. If you wish to change therapy going forward please send in a new electronic prescription.\"  "

## 2025-06-05 DIAGNOSIS — E11.22 TYPE 2 DIABETES MELLITUS WITH STAGE 2 CHRONIC KIDNEY DISEASE, WITHOUT LONG-TERM CURRENT USE OF INSULIN: ICD-10-CM

## 2025-06-05 DIAGNOSIS — N18.2 TYPE 2 DIABETES MELLITUS WITH STAGE 2 CHRONIC KIDNEY DISEASE, WITHOUT LONG-TERM CURRENT USE OF INSULIN: ICD-10-CM

## 2025-06-06 RX ORDER — SEMAGLUTIDE 0.68 MG/ML
INJECTION, SOLUTION SUBCUTANEOUS
Qty: 9 ML | Refills: 3 | Status: SHIPPED | OUTPATIENT
Start: 2025-06-06

## (undated) DEVICE — PK NEURO SPINE 40

## (undated) DEVICE — JACKSON-PRATT 100CC BULB RESERVOIR: Brand: CARDINAL HEALTH

## (undated) DEVICE — ANTIBACTERIAL UNDYED BRAIDED (POLYGLACTIN 910), SYNTHETIC ABSORBABLE SUTURE: Brand: COATED VICRYL

## (undated) DEVICE — DRP MICROSCOPE 4 BINOCULAR CV 54X150IN

## (undated) DEVICE — TIDISHIELD UROLOGY DRAIN BAGS FROSTY VINYL STERILE FITS SIEMENS UROSKOP ACCESS 20 PER CASE: Brand: TIDISHIELD

## (undated) DEVICE — TUBING, SUCTION, 1/4" X 20', STRAIGHT: Brand: MEDLINE INDUSTRIES, INC.

## (undated) DEVICE — MARKR SKIN W/RULR AND LBL

## (undated) DEVICE — DRN JP FLT NO TROC SIL FUL/PERF 7MM

## (undated) DEVICE — 6.0MM PRECISION ROUND

## (undated) DEVICE — CANN NASL CO2 TRULINK W/O2 A/

## (undated) DEVICE — SEALANT HEMOS FLOSEAL MATRX W/MALL TP 10ML EA/6

## (undated) DEVICE — TUBING, SUCTION, 1/4" X 10', STRAIGHT: Brand: MEDLINE

## (undated) DEVICE — THE TORRENT IRRIGATION SCOPE CONNECTOR IS USED WITH THE TORRENT IRRIGATION TUBING TO PROVIDE IRRIGATION FLUIDS SUCH AS STERILE WATER DURING GASTROINTESTINAL ENDOSCOPIC PROCEDURES WHEN USED IN CONJUNCTION WITH AN IRRIGATION PUMP (OR ELECTROSURGICAL UNIT).: Brand: TORRENT

## (undated) DEVICE — LOU TUR: Brand: MEDLINE INDUSTRIES, INC.

## (undated) DEVICE — NDL SPINE 22G 31/2IN BLK

## (undated) DEVICE — SYR CONTRL LUERLOK 10CC

## (undated) DEVICE — DISPOSABLE BIPOLAR FORCEPS 7 3/4" (19.7CM) SCOVILLE BAYONET, 1.5MM TIP AND 12 FT. (3.6M) CABLE: Brand: KIRWAN

## (undated) DEVICE — DRSNG TELFA PAD NONADH STR 1S 3X8IN

## (undated) DEVICE — PK ATS CUST W CARDIOTOMY RESEVOIR

## (undated) DEVICE — CONN TBG Y 5 IN 1 LF STRL

## (undated) DEVICE — MAT FLR ABSORBENT LG 4FT 10 2.5FT

## (undated) DEVICE — GLV SURG BIOGEL LTX PF 7

## (undated) DEVICE — 3.0MM PRECISION NEURO (MATCH HEAD)

## (undated) DEVICE — SMOKE EVACUATION TUBING WITH 7/8 IN TO 1/4 IN REDUCER: Brand: BUFFALO FILTER

## (undated) DEVICE — SYRINGE,TOOMEY,IRRIGATION,70CC,STERILE: Brand: MEDLINE

## (undated) DEVICE — OIL CARTRIDGE: Brand: CORE, MAESTRO

## (undated) DEVICE — BAG,DRAINAGE,4L,A/R TOWER,LL,SLIDE TAP: Brand: MEDLINE

## (undated) DEVICE — DRSNG WND GZ PAD BORDERED 4X8IN STRL

## (undated) DEVICE — Device: Brand: DEFENDO AIR/WATER/SUCTION AND BIOPSY VALVE

## (undated) DEVICE — APPL CHLORAPREP W/TINT 26ML ORNG

## (undated) DEVICE — EPIDURAL TRAY: Brand: MEDLINE INDUSTRIES, INC.

## (undated) DEVICE — GLV SURG TRIUMPH PF LTX 7.5 STRL

## (undated) DEVICE — GLV SURG PREMIERPRO ORTHO LTX PF SZ8 BRN

## (undated) DEVICE — 1071 S-DRP URO STLE-GAMA 10/BX,4X/C: Brand: STERI-DRAPE™

## (undated) DEVICE — GOWN,NON-REINFORCED,SIRUS,SET IN SLV,XXL: Brand: MEDLINE

## (undated) DEVICE — DRSNG WND BORDR/ADHS NONADHR/GZ LF 4X4IN STRL

## (undated) DEVICE — HF-RESECTION ELECTRODE PLASMALOOP LOOP, MEDIUM, 24 FR., 12°-30°, ESG TURIS: Brand: OLYMPUS

## (undated) DEVICE — DIFFUSER: Brand: CORE, MAESTRO

## (undated) DEVICE — CATH COUVALAIRE SIMPLASTIC 3WY 24F 30CC

## (undated) DEVICE — 3M™ STERI-STRIP™ REINFORCED ADHESIVE SKIN CLOSURES, R1547, 1/2 IN X 4 IN (12 MM X 100 MM), 6 STRIPS/ENVELOPE: Brand: 3M™ STERI-STRIP™